# Patient Record
Sex: FEMALE | Race: WHITE | NOT HISPANIC OR LATINO | Employment: OTHER | URBAN - METROPOLITAN AREA
[De-identification: names, ages, dates, MRNs, and addresses within clinical notes are randomized per-mention and may not be internally consistent; named-entity substitution may affect disease eponyms.]

---

## 2017-02-03 ENCOUNTER — ALLSCRIPTS OFFICE VISIT (OUTPATIENT)
Dept: OTHER | Facility: OTHER | Age: 42
End: 2017-02-03

## 2017-02-03 ENCOUNTER — GENERIC CONVERSION - ENCOUNTER (OUTPATIENT)
Dept: OTHER | Facility: OTHER | Age: 42
End: 2017-02-03

## 2017-02-03 DIAGNOSIS — Z01.419 ENCOUNTER FOR GYNECOLOGICAL EXAMINATION WITHOUT ABNORMAL FINDING: ICD-10-CM

## 2017-02-03 DIAGNOSIS — Z12.31 ENCOUNTER FOR SCREENING MAMMOGRAM FOR MALIGNANT NEOPLASM OF BREAST: ICD-10-CM

## 2017-02-16 ENCOUNTER — GENERIC CONVERSION - ENCOUNTER (OUTPATIENT)
Dept: OTHER | Facility: OTHER | Age: 42
End: 2017-02-16

## 2017-04-24 ENCOUNTER — GENERIC CONVERSION - ENCOUNTER (OUTPATIENT)
Dept: OTHER | Facility: OTHER | Age: 42
End: 2017-04-24

## 2017-07-10 ENCOUNTER — GENERIC CONVERSION - ENCOUNTER (OUTPATIENT)
Dept: OTHER | Facility: OTHER | Age: 42
End: 2017-07-10

## 2017-07-20 ENCOUNTER — ALLSCRIPTS OFFICE VISIT (OUTPATIENT)
Dept: OTHER | Facility: OTHER | Age: 42
End: 2017-07-20

## 2018-01-02 ENCOUNTER — ALLSCRIPTS OFFICE VISIT (OUTPATIENT)
Dept: OTHER | Facility: OTHER | Age: 43
End: 2018-01-02

## 2018-01-02 LAB
CLUE CELL (HISTORICAL): NORMAL
HCG, QUALITATIVE (HISTORICAL): NEGATIVE
HYPHAL YEAST (HISTORICAL): NORMAL
KOH PREP (HISTORICAL): NORMAL
PH UR STRIP.AUTO: 5 [PH]
TRICHOMONAS (HISTORICAL): NORMAL
YEAST (HISTORICAL): NORMAL

## 2018-01-12 NOTE — PROGRESS NOTES
Chief Complaint  Depo today  History of Present Illness  HPI: Presents for Depo but states she feels like she has a UTI, frequent urination, pain with urination   Hospital Based Practices Required Assessment:   Pain Assessment   the patient states they have pain  The pain is located in the suprapubic, with urination and palpation  The patient describes the pain as sharp  Active Problems    1  Abscess (682 9) (L02 91)   2  Acute pharyngitis, unspecified etiology (462) (J02 9)   3  Anxiety (300 00) (F41 9)   4  Birth control (V25 9) (Z30 9)   5  Breast lump (611 72) (N63)   6  Breast thickening (611 79) (N64 59)   7  Common migraine without aura (346 10) (G43 009)   8  Contraceptives (V25 02)   9  Dental caries (521 00) (K02 9)   10  Depression (311) (F32 9)   11  Dysuria (788 1) (R30 0)   12  Encounter for other contraceptive management (V25 8) (Z30 8)   13  Esophagitis, reflux (530 11) (K21 0)   14  Fatigue (780 79) (R53 83)   15  Fibromyalgia (729 1) (M79 7)   16  Functional diarrhea (564 5) (K59 1)   17  Generalized anxiety disorder (300 02) (F41 1)   18  Hepatitis, C Virus (070 70)   19  Herpes simplex infection (054 9) (B00 9)   20  Herpetic ulceration of vulva (054 12) (A60 04)   21  Itching (698 9) (L29 9)   22  Knee pain, left (719 46) (M25 562)   23  Need for prophylactic vaccination and inoculation against influenza (V04 81) (Z23)   24  On Depo-Provera for contraception (V25 49) (Z30 40)   25  Rheumatoid arthritis (714 0) (M06 9)   26  Tinea corporis (110 5) (B35 4)   27  Urinary tract infection (599 0) (N39 0)   28  URTI (acute upper respiratory infection) (465 9) (J06 9)   29  Visit for gynecologic examination (V72 31) (Z01 419)   30  Vulvovaginitis candida albicans (112 1) (B37 3)   31  Weight loss (783 21) (R63 4)    Current Meds   1  ALPRAZolam 1 MG Oral Tablet; Therapy: 29Aug2011 to (Last Juancho Zhou)  Requested for: 29Aug2011 Ordered   2   Cephalexin 500 MG Oral Capsule; TAKE 1 CAPSULE EVERY 12 HOURS DAILY; Therapy: 83OFL8341 to (Evaluate:50Sks5033)  Requested for: 76ZGL2495; Last   Rx:14Qtl3374 Ordered   3  MedroxyPROGESTERone Acetate 150 MG/ML Intramuscular Suspension; INJECT 1 ML   INTRAMUSCULARLY ONCE EVERY 3 MONTHS; Therapy: 95JFT3653 to (Evaluate:16Njr0348)  Requested for: 16Obn9751; Last   Rx:34Xxq3600 Ordered   4  MedroxyPROGESTERone Acetate 150 MG/ML Intramuscular Suspension; INJECT   EVERY 11 WEEKS AS DIRECTED; Therapy: 86Civ0915 to (Last Rx:69Def8509)  Requested for: 55Hdq1918 Ordered   5  Protonix 40 MG Oral Tablet Delayed Release; Therapy: (So Jim) to Recorded   6  Remeron TABS; Therapy: (Recorded:07Qnx9744) to Recorded   7  Restoril 30 MG Oral Capsule; Therapy: (Leticia Sarmiento) to Recorded   8  Suboxone SUBL; Therapy: (Recorded:03Oct2016) to Recorded    Allergies    1  Codeine Derivatives   2  Codeine Sulfate TABS   3  Morphine Derivatives   4  Morphine Sulfate (PF) SOLN   5  Morphine Sulfate SOLN   6  Sulfa Drugs  Denied    7  Aspirin TABS    Vitals  Signs    Systolic: 791  Diastolic: 83  Weight: 538 lb   BMI Calculated: 29 12  BSA Calculated: 1 87    Physical Exam    Constitutional   General appearance: No acute distress, well appearing and well nourished  Abdomen   Abdomen: Abnormal   tender with suprapubic palpation  Assessment    1  On Depo-Provera for contraception (V25 49) (Z30 40)   2   Urinary tract infection (599 0) (N39 0)    Plan  Birth control    · MedroxyPROGESTERone Acetate 150 MG/ML Intramuscular Suspension  (Depo-Provera)  Health Maintenance    · Follow-up visit in 1 month Evaluation and Treatment  Follow-up  Annual exam ue after 12/22/2016  Status: Hold For - Scheduling  Requested for:  91HFF8659   · Fluzone Quadrivalent 0 5 ML Intramuscular Suspension Prefilled Syringe  SocHx: On Depo-Provera for contraception    · MedroxyPROGESTERone Acetate 150 MG/ML Intramuscular Suspension  (Depo-Provera); INJECT INTRAMUSCULARLY AS DIRECTED  Urinary tract infection    · Nitrofurantoin Monohyd Macro 100 MG Oral Capsule (Macrobid); TAKE 1 CAPSULE  EVERY 12 HOURS DAILY   · (1) URINALYSIS w URINE C/S REFLEX (will reflex a microscopy if leukocytes, occult  blood, or nitrites are not within normal limits); Status:Active; Requested for:18Nov2016;    · (1) URINE CULTURE; Source:Urine, Clean Catch; Status:Active; Requested  for:18Nov2016;     Discussion/Summary    Safe and effective use of Macrobid provided  Encouraged to increase fluids  Annual due after 12/22/2016  Pt verbalized understanding of all discussed  Future Appointments    Date/Time Provider Specialty Site   02/03/2017 08:30 AM [facility] HILARIO Ye Schedule  [facility]     Signatures   Electronically signed by :  HILARIO Harrington; Nov 18 2016  1:09PM EST                       (Author)    Electronically signed by : Magdaleno Mcgee MD; Dec 13 2016  5:19PM EST

## 2018-01-13 VITALS
SYSTOLIC BLOOD PRESSURE: 129 MMHG | WEIGHT: 171 LBS | BODY MASS INDEX: 28.49 KG/M2 | DIASTOLIC BLOOD PRESSURE: 84 MMHG | HEIGHT: 65 IN

## 2018-01-13 NOTE — MISCELLANEOUS
Provider Comments  Provider Comments:   PT WAS NO SHOW FOR DEPO I CALLED AND LEFT MSG AND ALSO SENT A NO SHOW LETTER TO PT      Signatures   Electronically signed by : Arnaldo Perea, ; Jul 10 2017 12:25PM EST                       (Author)

## 2018-01-14 VITALS — DIASTOLIC BLOOD PRESSURE: 82 MMHG | SYSTOLIC BLOOD PRESSURE: 124 MMHG | BODY MASS INDEX: 23.3 KG/M2 | WEIGHT: 140 LBS

## 2018-01-14 NOTE — PROGRESS NOTES
Chief Complaint  Patient is here for Depo injection today  Active Problems    1  Abscess (682 9) (L02 91)   2  Anxiety (300 00) (F41 9)   3  Birth control (V25 9) (Z30 9)   4  Breast lump (611 72) (N63)   5  Breast thickening (611 79) (N64 59)   6  Common migraine without aura (346 10) (G43 009)   7  Contraceptives (V25 02)   8  Dental caries (521 00) (K02 9)   9  Depression (311) (F32 9)   10  Dysuria (788 1) (R30 0)   11  Encounter for Depo-Provera contraception (V25 49) (Z30 42)   12  Encounter for other contraceptive management (V25 8) (Z30 8)   13  Encounter for routine gynecological examination with Papanicolaou smear of cervix    (V72 31,V76 2) (Z01 419)   14  Encounter for screening mammogram for malignant neoplasm of breast (V76 12)    (Z12 31)   15  Esophagitis, reflux (530 11) (K21 0)   16  Fatigue (780 79) (R53 83)   17  Fibromyalgia (729 1) (M79 7)   18  Functional diarrhea (564 5) (K59 1)   19  Generalized anxiety disorder (300 02) (F41 1)   20  Hepatitis, C Virus (070 70)   21  Herpes simplex infection (054 9) (B00 9)   22  Herpetic ulceration of vulva (054 12) (A60 04)   23  Itching (698 9) (L29 9)   24  Knee pain, left (719 46) (M25 562)   25  Need for prophylactic vaccination and inoculation against influenza (V04 81) (Z23)   26  On Depo-Provera for contraception (V25 49) (Z30 40)   27  Rheumatoid arthritis (714 0) (M06 9)   28  Tinea corporis (110 5) (B35 4)   29  Visit for gynecologic examination (V72 31) (Z01 419)   30  Weight loss (783 21) (R63 4)    Current Meds   1  ALPRAZolam 1 MG Oral Tablet; Therapy: 19Zju5372 to (Last Keri Tao)  Requested for: 09Rpd5794 Ordered   2  Cephalexin 500 MG Oral Capsule; TAKE 1 CAPSULE EVERY 12 HOURS DAILY; Therapy: 01KJL0918 to (Evaluate:53Uli8084)  Requested for: 51USM7119; Last   Rx:15Lmk9071 Ordered   3  MedroxyPROGESTERone Acetate 150 MG/ML Intramuscular Suspension; INJECT 1 ML   INTRAMUSCULARLY ONCE EVERY 3 MONTHS;    Therapy: 65YCP7204 to (Evaluate:66Yms8380)  Requested for: 47XCZ5181; Last   Rx:79Ezd8022 Ordered   4  MedroxyPROGESTERone Acetate 150 MG/ML Intramuscular Suspension; INJECT   EVERY 11 WEEKS AS DIRECTED; Therapy: 89Jnv8635 to (Last Rx:39Avp2657)  Requested for: 07Iwc8937 Ordered   5  MedroxyPROGESTERone Acetate 150 MG/ML Intramuscular Suspension; INJECT   INTRAMUSCULARLY AS DIRECTED; Therapy: 71ORQ1870 to (Annalisa Yfn)  Requested for: 62GQO6938; Last   Rx:14Nov2016 Ordered   6  Protonix 40 MG Oral Tablet Delayed Release; Therapy: (Vick Willis) to Recorded   7  Remeron TABS; Therapy: (Recorded:41Bbh9118) to Recorded   8  Suboxone SUBL; Therapy: (Recorded:03Oct2016) to Recorded    Allergies    1  Codeine Derivatives   2  Codeine Sulfate TABS   3  Morphine Derivatives   4  Morphine Sulfate (PF) SOLN   5  Morphine Sulfate SOLN   6  Sulfa Drugs  Denied    7   Aspirin TABS    Vitals  Signs    Systolic: 791  Diastolic: 82  Weight: 967 lb   BMI Calculated: 23 3  BSA Calculated: 1 7    Plan  Birth control    · MedroxyPROGESTERone Acetate 150 MG/ML Intramuscular Suspension    Future Appointments    Date/Time Provider Specialty Site   10/10/2017 03:00 PM [facility], Injection Schedule  [facility]     Signatures   Electronically signed by : Lisette Jackson, ; Jul 20 2017  3:12PM EST                       (Author)    Electronically signed by : Tin Cervantes MD; Jul 20 2017  4:41PM EST                       (Administrative)

## 2018-01-18 NOTE — PROGRESS NOTES
Chief Complaint  Depo      Active Problems    1  Acute pharyngitis, unspecified etiology (462) (J02 9)   2  Anxiety (300 00) (F41 9)   3  Birth control (V25 9) (Z30 9)   4  Breast lump (611 72) (N63)   5  Breast thickening (611 79) (N64 59)   6  Common migraine without aura (346 10) (G43 009)   7  Contraceptives (V25 02)   8  Dental caries (521 00) (K02 9)   9  Depression (311) (F32 9)   10  Dysuria (788 1) (R30 0)   11  Encounter for other contraceptive management (V25 8) (Z30 8)   12  Esophagitis, reflux (530 11) (K21 0)   13  Fatigue (780 79) (R53 83)   14  Fibromyalgia (729 1) (M79 7)   15  Functional diarrhea (564 5) (K59 1)   16  Generalized anxiety disorder (300 02) (F41 1)   17  Hepatitis, C Virus (070 70)   18  Herpes simplex infection (054 9) (B00 9)   19  Herpetic ulceration of vulva (054 12) (A60 04)   20  Itching (698 9) (L29 9)   21  Knee pain, left (719 46) (M25 562)   22  Need for prophylactic vaccination and inoculation against influenza (V04 81) (Z23)   23  Rheumatoid arthritis (714 0) (M06 9)   24  Tinea corporis (110 5) (B35 4)   25  Urinary tract infection (599 0) (N39 0)   26  URTI (acute upper respiratory infection) (465 9) (J06 9)   27  Visit for gynecologic examination (V72 31) (Z01 419)   28  Vulvovaginitis candida albicans (112 1) (B37 3)   29  Weight loss (783 21) (R63 4)    Current Meds   1  ALPRAZolam 1 MG Oral Tablet; Therapy: 00Smw3762 to (Last Fernando Godfrey)  Requested for: 00Kku7795 Ordered   2  MedroxyPROGESTERone Acetate 150 MG/ML Intramuscular Suspension; INJECT 1 ML   INTRAMUSCULARLY ONCE EVERY 3 MONTHS; Therapy: 87FIX4597 to (Evaluate:37Riu4073)  Requested for: 02Gcl8688; Last   Rx:44Eqc8016 Ordered   3  Protonix 40 MG Oral Tablet Delayed Release; Therapy: (Beau Coleman) to Recorded   4  Remeron TABS; Therapy: (Recorded:55Aps6977) to Recorded   5  Restoril 30 MG Oral Capsule; Therapy: (Recorded:28Apr2015) to Recorded    Allergies    1  Codeine Sulfate TABS   2  Morphine Sulfate SOLN   3  Sulfa Drugs  Denied    4  Aspirin TABS    Vitals  Signs [Data Includes: Current Encounter]    Systolic: 071  Diastolic: 82  Weight: 465 lb   BMI Calculated: 27 15  BSA Calculated: 1 8    Plan  Birth control    · MedroxyPROGESTERone Acetate 150 MG/ML Intramuscular Suspension  (Depo-Provera)    Future Appointments    Date/Time Provider Specialty Site   06/13/2016 03:15 PM [facility], Injection Schedule  [facility]     Signatures   Electronically signed by :  HILARIO Leblanc; Mar 31 2016  9:02AM EST                       (Acknowledgement)    Electronically signed by : Katarina Young MD; Apr 4 2016 12:04PM EST

## 2018-01-22 VITALS — SYSTOLIC BLOOD PRESSURE: 109 MMHG | WEIGHT: 176 LBS | DIASTOLIC BLOOD PRESSURE: 69 MMHG | BODY MASS INDEX: 29.29 KG/M2

## 2018-01-22 VITALS
WEIGHT: 175 LBS | BODY MASS INDEX: 29.16 KG/M2 | HEIGHT: 65 IN | SYSTOLIC BLOOD PRESSURE: 102 MMHG | DIASTOLIC BLOOD PRESSURE: 72 MMHG

## 2018-02-20 ENCOUNTER — OFFICE VISIT (OUTPATIENT)
Dept: OBGYN CLINIC | Facility: CLINIC | Age: 43
End: 2018-02-20
Payer: COMMERCIAL

## 2018-02-20 VITALS
WEIGHT: 167 LBS | DIASTOLIC BLOOD PRESSURE: 77 MMHG | BODY MASS INDEX: 27.82 KG/M2 | HEIGHT: 65 IN | SYSTOLIC BLOOD PRESSURE: 121 MMHG | HEART RATE: 87 BPM

## 2018-02-20 DIAGNOSIS — Z12.39 ENCOUNTER FOR OTHER SCREENING FOR MALIGNANT NEOPLASM OF BREAST: ICD-10-CM

## 2018-02-20 DIAGNOSIS — Z01.419 ENCOUNTER FOR ANNUAL ROUTINE GYNECOLOGICAL EXAMINATION: Primary | ICD-10-CM

## 2018-02-20 PROCEDURE — 99214 OFFICE O/P EST MOD 30 MIN: CPT | Performed by: NURSE PRACTITIONER

## 2018-02-20 RX ORDER — ALPRAZOLAM 0.25 MG/1
TABLET ORAL 3 TIMES DAILY PRN
COMMUNITY
End: 2018-09-17

## 2018-02-20 NOTE — PROGRESS NOTES
Assessment     Annual GYN exam    Depoprovera for birth control            Plan      All questions answered  Breast self exam technique reviewed and patient encouraged to perform self-exam monthly  Contraception: Depo-Provera injections  Discussed healthy lifestyle modifications  Follow up in 1 year  Mammogram   Schedule mammogram  Call with needs or concerns  Return in 1 year  Pt verbalized understanding of all discussed  Yumiko Fry is a 40 y o  female who presents for annual exam  Periods are not present, pt is on Depo , Dysmenorrhea:none  Cyclic symptoms include none  No intermenstrual bleeding, spotting, or discharge  The patient reports that there is not domestic violence in her life  Last PAP 2017 neg  High risk HPV, WNL PAP    Current contraception: Depo-Provera injections  History of abnormal Pap smear: no  Family history of uterine or ovarian cancer: no  Regular self breast exam: yes  History of abnormal mammogram: N/A  Family history of breast cancer: yes - grandfather, great-grandmother  History of abnormal lipids: no    Menstrual History: No period on Depo    OB History      Para Term  AB Living    4 2 2   2 2    SAB TAB Ectopic Multiple Live Births      2     2         Menarche age: 9  No LMP recorded  Patient has had an injection  , Depoprovera       The following portions of the patient's history were reviewed and updated as appropriate: allergies, current medications, past family history, past medical history, past social history, past surgical history and problem list     Review of Systems  Pertinent items are noted in HPI        Objective      /77   Pulse 87   Ht 5' 5" (1 651 m)   Wt 75 8 kg (167 lb)   BMI 27 79 kg/m²     General:   alert and oriented, in no acute distress, alert, appears stated age and cooperative   Heart: regular rate and rhythm, S1, S2 normal, no murmur, click, rub or gallop   Lungs: clear to auscultation bilaterally Abdomen: soft, non-tender, without masses or organomegaly   Vulva: normal   Vagina: normal mucosa   Cervix: no cervical motion tenderness and no lesions   Uterus: normal size, normal shape and consistency   Adnexa: normal adnexa   Breast NT, neg   Lumps, discharge,dimpling

## 2018-03-09 ENCOUNTER — HOSPITAL ENCOUNTER (OUTPATIENT)
Dept: MAMMOGRAPHY | Facility: HOSPITAL | Age: 43
Discharge: HOME | End: 2018-03-09
Payer: COMMERCIAL

## 2018-03-09 DIAGNOSIS — Z12.39 ENCOUNTER FOR OTHER SCREENING FOR MALIGNANT NEOPLASM OF BREAST: ICD-10-CM

## 2018-03-09 PROCEDURE — 77067 SCR MAMMO BI INCL CAD: CPT

## 2018-03-23 ENCOUNTER — OFFICE VISIT (OUTPATIENT)
Dept: URGENT CARE | Age: 43
End: 2018-03-23
Payer: COMMERCIAL

## 2018-03-23 VITALS
BODY MASS INDEX: 28.32 KG/M2 | TEMPERATURE: 98.6 F | HEART RATE: 101 BPM | DIASTOLIC BLOOD PRESSURE: 72 MMHG | OXYGEN SATURATION: 97 % | RESPIRATION RATE: 20 BRPM | HEIGHT: 65 IN | WEIGHT: 170 LBS | SYSTOLIC BLOOD PRESSURE: 132 MMHG

## 2018-03-23 DIAGNOSIS — R31.9 URINARY TRACT INFECTION WITH HEMATURIA, SITE UNSPECIFIED: Primary | ICD-10-CM

## 2018-03-23 DIAGNOSIS — R35.0 URINARY FREQUENCY: ICD-10-CM

## 2018-03-23 DIAGNOSIS — N39.0 URINARY TRACT INFECTION WITH HEMATURIA, SITE UNSPECIFIED: Primary | ICD-10-CM

## 2018-03-23 LAB
SL AMB  POCT GLUCOSE, UA: NEGATIVE
SL AMB LEUKOCYTE ESTERASE,UA: NEGATIVE
SL AMB POCT BILIRUBIN,UA: NEGATIVE
SL AMB POCT BLOOD,UA: NORMAL
SL AMB POCT CLARITY,UA: NORMAL
SL AMB POCT COLOR,UA: YELLOW
SL AMB POCT KETONES,UA: NEGATIVE
SL AMB POCT NITRITE,UA: NEGATIVE
SL AMB POCT PH,UA: 5
SL AMB POCT SPECIFIC GRAVITY,UA: 1
SL AMB POCT URINE PROTEIN: NEGATIVE
SL AMB POCT UROBILINOGEN: 0.2

## 2018-03-23 PROCEDURE — 99213 OFFICE O/P EST LOW 20 MIN: CPT | Performed by: FAMILY MEDICINE

## 2018-03-23 PROCEDURE — 87086 URINE CULTURE/COLONY COUNT: CPT | Performed by: FAMILY MEDICINE

## 2018-03-23 RX ORDER — FLUCONAZOLE 150 MG/1
TABLET ORAL
Qty: 3 TABLET | Refills: 0 | Status: SHIPPED | OUTPATIENT
Start: 2018-03-23 | End: 2018-03-23

## 2018-03-23 RX ORDER — CIPROFLOXACIN 500 MG/1
500 TABLET, FILM COATED ORAL EVERY 12 HOURS SCHEDULED
Qty: 20 TABLET | Refills: 0 | Status: SHIPPED | OUTPATIENT
Start: 2018-03-23 | End: 2018-04-02

## 2018-03-23 NOTE — PROGRESS NOTES
[facility]        NAME: Marisa Dickens is a 44 y o  female  : 1975    MRN: [id number]  DATE: 2018  TIME: 12:32 PM    Assessment and Plan   Urinary tract infection with hematuria, site unspecified [N39 0, R31 9]  1  Urinary tract infection with hematuria, site unspecified  ciprofloxacin (CIPRO) 500 mg tablet    fluconazole (DIFLUCAN) 150 mg tablet   2  Urinary frequency  POCT urine dip         Patient Instructions     Patient Instructions   Cipro twice a day until finished (please take probiotics)  Increase fluids (cranberry juice)  Take 1 Diflucan, may repeat weekly as needed  Recheck/follow-up with family physician/GYN or Urology as needed  Ferdinand Vásquez  [phone]    Please go to the hospital emergency department if worse  Proceed to  ER if symptoms worsen  Chief Complaint     Chief Complaint   Patient presents with    Urinary Frequency    Back Pain         History of Present Illness       Patient with urinary frequency dysuria, left-sided mid back pain, chills/fever; patient states she has had urinary tract infections in the past; patient states she also gets recurring yeast infections        Review of Systems   Review of Systems   Constitutional: Positive for chills and fatigue  Respiratory: Negative  Cardiovascular: Negative  Gastrointestinal: Negative  Genitourinary: Positive for dysuria, frequency and vaginal discharge  Musculoskeletal:        Tenderness over left mid back/left CVA area   Skin: Negative            Current Medications       Current Outpatient Prescriptions:     ALPRAZolam (XANAX) 0 25 mg tablet, Take by mouth 3 (three) times a day as needed for anxiety, Disp: , Rfl:     Buprenorphine HCl-Naloxone HCl (ZUBSOLV SL), Place under the tongue daily, Disp: , Rfl:     ciprofloxacin (CIPRO) 500 mg tablet, Take 1 tablet (500 mg total) by mouth every 12 (twelve) hours for 20 doses, Disp: 20 tablet, Rfl: 0    fluconazole (DIFLUCAN) 150 mg tablet, Take 1 tablet, may repeat weekly as needed, Disp: 3 tablet, Rfl: 0    Current Allergies     Allergies as of 03/23/2018 - Reviewed 03/23/2018   Allergen Reaction Noted    Sulfa antibiotics Anaphylaxis 02/20/2018    Codeine  04/22/2012    Morphine  04/22/2012            The following portions of the patient's history were reviewed and updated as appropriate: allergies, current medications, past family history, past medical history, past social history, past surgical history and problem list      Past Medical History:   Diagnosis Date    Arthritis     Endometriosis     no current issues    Infectious viral hepatitis     history of hep c    Irritable bowel syndrome        Past Surgical History:   Procedure Laterality Date    APPENDECTOMY      CHOLECYSTECTOMY         Family History   Problem Relation Age of Onset    Cancer Father          Medications have been verified  Objective   /72   Pulse 101   Temp 98 6 °F (37 °C) (Temporal)   Resp 20   Ht 5' 5" (1 651 m)   Wt 77 1 kg (170 lb)   SpO2 97%   BMI 28 29 kg/m²        Physical Exam     Physical Exam   Constitutional: She is oriented to person, place, and time  She appears well-developed and well-nourished  HENT:   Mouth/Throat: Oropharynx is clear and moist    Neck: Normal range of motion  Neck supple  Musculoskeletal:   Generalized discomfort over left mid back/left CVA area   Neurological: She is alert and oriented to person, place, and time  Good color and turgor   Skin: Skin is warm  Good color and turgor   Psychiatric: She has a normal mood and affect  Her behavior is normal    Nursing note and vitals reviewed

## 2018-03-23 NOTE — PATIENT INSTRUCTIONS
Cipro twice a day until finished (please take probiotics)  Increase fluids (cranberry juice)  Take 1 Diflucan, may repeat weekly as needed  Recheck/follow-up with family physician/GYN or Urology as needed  Asuncion Escalante  [phone]    Please go to the hospital emergency department if worse

## 2018-03-24 ENCOUNTER — TELEPHONE (OUTPATIENT)
Dept: URGENT CARE | Age: 43
End: 2018-03-24

## 2018-03-24 LAB — BACTERIA UR CULT: NORMAL

## 2018-03-26 ENCOUNTER — CLINICAL SUPPORT (OUTPATIENT)
Dept: OBGYN CLINIC | Facility: CLINIC | Age: 43
End: 2018-03-26
Payer: COMMERCIAL

## 2018-03-26 DIAGNOSIS — Z30.42 ENCOUNTER FOR SURVEILLANCE OF INJECTABLE CONTRACEPTIVE: Primary | ICD-10-CM

## 2018-03-26 PROCEDURE — 96372 THER/PROPH/DIAG INJ SC/IM: CPT | Performed by: OBSTETRICS & GYNECOLOGY

## 2018-03-26 RX ORDER — MEDROXYPROGESTERONE ACETATE 150 MG/ML
150 INJECTION, SUSPENSION INTRAMUSCULAR ONCE
Status: COMPLETED | OUTPATIENT
Start: 2018-03-26 | End: 2018-03-26

## 2018-03-26 RX ADMIN — MEDROXYPROGESTERONE ACETATE 150 MG: 150 INJECTION, SUSPENSION INTRAMUSCULAR at 10:45

## 2018-06-11 ENCOUNTER — CLINICAL SUPPORT (OUTPATIENT)
Dept: OBGYN CLINIC | Facility: CLINIC | Age: 43
End: 2018-06-11
Payer: COMMERCIAL

## 2018-06-11 DIAGNOSIS — Z30.42 ENCOUNTER FOR SURVEILLANCE OF INJECTABLE CONTRACEPTIVE: Primary | ICD-10-CM

## 2018-06-11 PROCEDURE — 96372 THER/PROPH/DIAG INJ SC/IM: CPT

## 2018-06-11 RX ORDER — MEDROXYPROGESTERONE ACETATE 150 MG/ML
150 INJECTION, SUSPENSION INTRAMUSCULAR ONCE
Status: COMPLETED | OUTPATIENT
Start: 2018-06-11 | End: 2018-06-11

## 2018-06-11 RX ORDER — MEDROXYPROGESTERONE ACETATE 150 MG/ML
150 INJECTION, SUSPENSION INTRAMUSCULAR
Qty: 1 ML | Refills: 5 | Status: SHIPPED | OUTPATIENT
Start: 2018-06-11 | End: 2019-07-23 | Stop reason: SDUPTHER

## 2018-06-11 RX ADMIN — MEDROXYPROGESTERONE ACETATE 150 MG: 150 INJECTION, SUSPENSION INTRAMUSCULAR at 09:53

## 2018-08-27 ENCOUNTER — TELEPHONE (OUTPATIENT)
Dept: OBGYN CLINIC | Facility: CLINIC | Age: 43
End: 2018-08-27

## 2018-08-27 NOTE — TELEPHONE ENCOUNTER
left message on v/m about r/s her depo apt due to no providers in the office  told her to call back to reschedule

## 2018-09-04 ENCOUNTER — CLINICAL SUPPORT (OUTPATIENT)
Dept: OBGYN CLINIC | Facility: CLINIC | Age: 43
End: 2018-09-04
Payer: COMMERCIAL

## 2018-09-04 DIAGNOSIS — Z30.42 ENCOUNTER FOR SURVEILLANCE OF INJECTABLE CONTRACEPTIVE: Primary | ICD-10-CM

## 2018-09-04 PROCEDURE — 96372 THER/PROPH/DIAG INJ SC/IM: CPT

## 2018-09-04 RX ORDER — MEDROXYPROGESTERONE ACETATE 150 MG/ML
150 INJECTION, SUSPENSION INTRAMUSCULAR ONCE
Status: COMPLETED | OUTPATIENT
Start: 2018-09-04 | End: 2018-09-04

## 2018-09-04 RX ADMIN — MEDROXYPROGESTERONE ACETATE 150 MG: 150 INJECTION, SUSPENSION INTRAMUSCULAR at 14:21

## 2018-09-04 NOTE — PROGRESS NOTES
Depo-Provera      [x]   Patient provided box yes   syringe    Last  Annual/Pap Date:2/20/18   Last Depo date: 6/11/18   Side effects: none   HCG; if applicable: N/A   Given by: Jo-Ann Aragon Site: right buttock   Next appt  due: 11/20/18   Calcium supplement daily teaching, condoms for 2 weeks following first injection dose

## 2018-09-17 ENCOUNTER — APPOINTMENT (EMERGENCY)
Dept: CT IMAGING | Facility: HOSPITAL | Age: 43
End: 2018-09-17
Payer: COMMERCIAL

## 2018-09-17 ENCOUNTER — OFFICE VISIT (OUTPATIENT)
Dept: URGENT CARE | Age: 43
End: 2018-09-17
Payer: COMMERCIAL

## 2018-09-17 ENCOUNTER — HOSPITAL ENCOUNTER (EMERGENCY)
Facility: HOSPITAL | Age: 43
Discharge: HOME | End: 2018-09-17
Attending: EMERGENCY MEDICINE
Payer: COMMERCIAL

## 2018-09-17 VITALS
BODY MASS INDEX: 28.62 KG/M2 | TEMPERATURE: 97.5 F | RESPIRATION RATE: 20 BRPM | WEIGHT: 171.8 LBS | OXYGEN SATURATION: 97 % | HEART RATE: 108 BPM | DIASTOLIC BLOOD PRESSURE: 74 MMHG | HEIGHT: 65 IN | SYSTOLIC BLOOD PRESSURE: 140 MMHG

## 2018-09-17 VITALS
SYSTOLIC BLOOD PRESSURE: 113 MMHG | DIASTOLIC BLOOD PRESSURE: 68 MMHG | BODY MASS INDEX: 28.62 KG/M2 | TEMPERATURE: 98.4 F | HEART RATE: 75 BPM | RESPIRATION RATE: 18 BRPM | WEIGHT: 171.96 LBS | OXYGEN SATURATION: 98 %

## 2018-09-17 DIAGNOSIS — R19.7 DIARRHEA, UNSPECIFIED TYPE: ICD-10-CM

## 2018-09-17 DIAGNOSIS — R10.9 ABDOMINAL PAIN: ICD-10-CM

## 2018-09-17 DIAGNOSIS — R19.7 DIARRHEA: Primary | ICD-10-CM

## 2018-09-17 DIAGNOSIS — R10.32 LEFT LOWER QUADRANT PAIN: Primary | ICD-10-CM

## 2018-09-17 LAB
ALBUMIN SERPL BCP-MCNC: 4.1 G/DL (ref 3.5–5)
ALP SERPL-CCNC: 38 U/L (ref 46–116)
ALT SERPL W P-5'-P-CCNC: 22 U/L (ref 12–78)
ANION GAP SERPL CALCULATED.3IONS-SCNC: 7 MMOL/L (ref 4–13)
AST SERPL W P-5'-P-CCNC: 18 U/L (ref 5–45)
BACTERIA UR QL AUTO: ABNORMAL /HPF
BASOPHILS # BLD AUTO: 0.03 THOUSANDS/ΜL (ref 0–0.1)
BASOPHILS NFR BLD AUTO: 1 % (ref 0–1)
BILIRUB SERPL-MCNC: 0.43 MG/DL (ref 0.2–1)
BILIRUB UR QL STRIP: NEGATIVE
BUN SERPL-MCNC: 15 MG/DL (ref 5–25)
CALCIUM SERPL-MCNC: 9 MG/DL (ref 8.3–10.1)
CHLORIDE SERPL-SCNC: 104 MMOL/L (ref 100–108)
CLARITY UR: CLEAR
CO2 SERPL-SCNC: 28 MMOL/L (ref 21–32)
COLOR UR: YELLOW
CREAT SERPL-MCNC: 0.68 MG/DL (ref 0.6–1.3)
EOSINOPHIL # BLD AUTO: 0.15 THOUSAND/ΜL (ref 0–0.61)
EOSINOPHIL NFR BLD AUTO: 2 % (ref 0–6)
ERYTHROCYTE [DISTWIDTH] IN BLOOD BY AUTOMATED COUNT: 12 % (ref 11.6–15.1)
EXT PREG TEST URINE: NEGATIVE
GFR SERPL CREATININE-BSD FRML MDRD: 108 ML/MIN/1.73SQ M
GLUCOSE SERPL-MCNC: 88 MG/DL (ref 65–140)
GLUCOSE UR STRIP-MCNC: NEGATIVE MG/DL
HCT VFR BLD AUTO: 41 % (ref 34.8–46.1)
HGB BLD-MCNC: 13.6 G/DL (ref 11.5–15.4)
HGB UR QL STRIP.AUTO: ABNORMAL
IMM GRANULOCYTES # BLD AUTO: 0.01 THOUSAND/UL (ref 0–0.2)
IMM GRANULOCYTES NFR BLD AUTO: 0 % (ref 0–2)
KETONES UR STRIP-MCNC: NEGATIVE MG/DL
LEUKOCYTE ESTERASE UR QL STRIP: ABNORMAL
LIPASE SERPL-CCNC: 106 U/L (ref 73–393)
LYMPHOCYTES # BLD AUTO: 2.96 THOUSANDS/ΜL (ref 0.6–4.47)
LYMPHOCYTES NFR BLD AUTO: 45 % (ref 14–44)
MCH RBC QN AUTO: 29.1 PG (ref 26.8–34.3)
MCHC RBC AUTO-ENTMCNC: 33.2 G/DL (ref 31.4–37.4)
MCV RBC AUTO: 88 FL (ref 82–98)
MONOCYTES # BLD AUTO: 0.31 THOUSAND/ΜL (ref 0.17–1.22)
MONOCYTES NFR BLD AUTO: 5 % (ref 4–12)
NEUTROPHILS # BLD AUTO: 3.17 THOUSANDS/ΜL (ref 1.85–7.62)
NEUTS SEG NFR BLD AUTO: 47 % (ref 43–75)
NITRITE UR QL STRIP: NEGATIVE
NON-SQ EPI CELLS URNS QL MICRO: ABNORMAL /HPF
NRBC BLD AUTO-RTO: 0 /100 WBCS
PH UR STRIP.AUTO: 5.5 [PH] (ref 4.5–8)
PLATELET # BLD AUTO: 183 THOUSANDS/UL (ref 149–390)
PMV BLD AUTO: 10.6 FL (ref 8.9–12.7)
POTASSIUM SERPL-SCNC: 3.8 MMOL/L (ref 3.5–5.3)
PROT SERPL-MCNC: 7.8 G/DL (ref 6.4–8.2)
PROT UR STRIP-MCNC: NEGATIVE MG/DL
RBC # BLD AUTO: 4.67 MILLION/UL (ref 3.81–5.12)
RBC #/AREA URNS AUTO: ABNORMAL /HPF
SODIUM SERPL-SCNC: 139 MMOL/L (ref 136–145)
SP GR UR STRIP.AUTO: 1.02 (ref 1–1.03)
UROBILINOGEN UR QL STRIP.AUTO: 0.2 E.U./DL
WBC # BLD AUTO: 6.63 THOUSAND/UL (ref 4.31–10.16)
WBC #/AREA URNS AUTO: ABNORMAL /HPF

## 2018-09-17 PROCEDURE — 99213 OFFICE O/P EST LOW 20 MIN: CPT | Performed by: PHYSICIAN ASSISTANT

## 2018-09-17 PROCEDURE — 99284 EMERGENCY DEPT VISIT MOD MDM: CPT

## 2018-09-17 PROCEDURE — 85025 COMPLETE CBC W/AUTO DIFF WBC: CPT | Performed by: STUDENT IN AN ORGANIZED HEALTH CARE EDUCATION/TRAINING PROGRAM

## 2018-09-17 PROCEDURE — 81001 URINALYSIS AUTO W/SCOPE: CPT

## 2018-09-17 PROCEDURE — 96361 HYDRATE IV INFUSION ADD-ON: CPT

## 2018-09-17 PROCEDURE — 74177 CT ABD & PELVIS W/CONTRAST: CPT

## 2018-09-17 PROCEDURE — 81025 URINE PREGNANCY TEST: CPT | Performed by: STUDENT IN AN ORGANIZED HEALTH CARE EDUCATION/TRAINING PROGRAM

## 2018-09-17 PROCEDURE — 36415 COLL VENOUS BLD VENIPUNCTURE: CPT | Performed by: STUDENT IN AN ORGANIZED HEALTH CARE EDUCATION/TRAINING PROGRAM

## 2018-09-17 PROCEDURE — 96374 THER/PROPH/DIAG INJ IV PUSH: CPT

## 2018-09-17 PROCEDURE — 80053 COMPREHEN METABOLIC PANEL: CPT | Performed by: STUDENT IN AN ORGANIZED HEALTH CARE EDUCATION/TRAINING PROGRAM

## 2018-09-17 PROCEDURE — 83690 ASSAY OF LIPASE: CPT | Performed by: STUDENT IN AN ORGANIZED HEALTH CARE EDUCATION/TRAINING PROGRAM

## 2018-09-17 RX ORDER — ALPRAZOLAM 2 MG/1
TABLET ORAL
COMMUNITY
Start: 2018-08-22 | End: 2018-09-17

## 2018-09-17 RX ORDER — MEDROXYPROGESTERONE ACETATE 150 MG/ML
INJECTION, SUSPENSION INTRAMUSCULAR
COMMUNITY
Start: 2018-08-27 | End: 2018-09-17

## 2018-09-17 RX ORDER — ALPRAZOLAM 2 MG/1
2 TABLET ORAL 3 TIMES DAILY
COMMUNITY
Start: 2018-03-10 | End: 2021-06-30

## 2018-09-17 RX ORDER — ONDANSETRON 2 MG/ML
4 INJECTION INTRAMUSCULAR; INTRAVENOUS ONCE
Status: COMPLETED | OUTPATIENT
Start: 2018-09-17 | End: 2018-09-17

## 2018-09-17 RX ORDER — FLUOXETINE HYDROCHLORIDE 20 MG/1
20 CAPSULE ORAL DAILY
COMMUNITY
Start: 2018-07-24 | End: 2019-08-27

## 2018-09-17 RX ORDER — BUPRENORPHINE HYDROCHLORIDE AND NALOXONE HYDROCHLORIDE 8.6; 2.1 MG/1; MG/1
2 TABLET, ORALLY DISINTEGRATING SUBLINGUAL EVERY MORNING
COMMUNITY
Start: 2018-08-22 | End: 2019-02-04 | Stop reason: SDUPTHER

## 2018-09-17 RX ORDER — PROMETHAZINE HYDROCHLORIDE 25 MG/1
25 TABLET ORAL EVERY 6 HOURS
COMMUNITY
End: 2019-09-19 | Stop reason: SDUPTHER

## 2018-09-17 RX ORDER — ACETAMINOPHEN 325 MG/1
975 TABLET ORAL ONCE
Status: COMPLETED | OUTPATIENT
Start: 2018-09-17 | End: 2018-09-17

## 2018-09-17 RX ADMIN — SODIUM CHLORIDE 1000 ML: 0.9 INJECTION, SOLUTION INTRAVENOUS at 21:38

## 2018-09-17 RX ADMIN — IOHEXOL 100 ML: 350 INJECTION, SOLUTION INTRAVENOUS at 21:47

## 2018-09-17 RX ADMIN — ACETAMINOPHEN 975 MG: 325 TABLET, FILM COATED ORAL at 22:05

## 2018-09-17 RX ADMIN — ONDANSETRON 4 MG: 2 INJECTION INTRAMUSCULAR; INTRAVENOUS at 21:38

## 2018-09-17 NOTE — PROGRESS NOTES
[facility]        NAME: Noah Baer is a 44 y o  female  : 1975    MRN: [id number]  DATE: 2018  TIME: 3:05 PM    Assessment and Plan   Left lower quadrant pain [R10 32]  1  Left lower quadrant pain  Transfer to other facility   2  Diarrhea, unspecified type  Transfer to other facility         Patient Instructions       Follow up with PCP in 3-5 days  Proceed to  ER if symptoms worsen  Chief Complaint     Chief Complaint   Patient presents with    Diarrhea     patient reports grandfather is in [place] [address] being treated for C-Diff, was caring for him prior, washing his soiled linen  the last 3 days has had a fever(102 2) lower left abdominal pain, chills, sweats, no appetite  last dose of Ibuprofen at 0600  History of Present Illness       78-year-old female presents with left lower abdominal pain with diarrhea for the past 3 days  Patient reports she has been taking care of her sick grandfather for the past several days who has been recently hospitalized for C diff  Patient for the 3 days prior to his hospitalization she was taking care of him and cleaning him and changing his linens and washing them  Three days ago patient started to develop left lower abdominal pain with diarrhea  Patient reports he is having approximately 10 bowel movements a day which were all loose and is now noticing blood in the stool and throughout the bowl when she has a bowel movement  Patient reports yesterday she was feeling very feverish  Patient also reports now she is feeling very weak and run down at this point time  Denies any chest pain cough or shortness of breath  No problems with urination  Diarrhea    The current episode started in the past 7 days  The problem occurs more than 10 times per day  The problem has been unchanged  The stool consistency is described as blood tinged, bloody and watery  The patient states that diarrhea awakens her from sleep   Associated symptoms include abdominal pain, chills, a fever, sweats and weight loss  Pertinent negatives include no bloating, coughing, increased  flatus, myalgias, URI or vomiting  Nothing aggravates the symptoms  Risk factors include ill contacts (Grandfather had C difficile)  She has tried nothing for the symptoms  The treatment provided no relief  Review of Systems   Review of Systems   Constitutional: Positive for appetite change, chills, fatigue, fever and weight loss  HENT: Negative  Eyes: Negative  Respiratory: Negative  Negative for cough  Cardiovascular: Negative  Gastrointestinal: Positive for abdominal pain and diarrhea  Negative for bloating, flatus and vomiting  Genitourinary: Negative  Musculoskeletal: Negative  Negative for myalgias  Skin: Negative  Neurological: Positive for weakness           Current Medications       Current Outpatient Prescriptions:     ALPRAZolam (XANAX) 2 MG tablet, , Disp: , Rfl:     FLUoxetine (PROzac) 20 mg capsule, , Disp: , Rfl:     medroxyPROGESTERone (DEPO-PROVERA) 150 mg/mL injection, Inject 1 mL (150 mg total) into the shoulder, thigh, or buttocks every 3 (three) months, Disp: 1 mL, Rfl: 5    promethazine (PHENERGAN) 25 mg tablet, Take 25 mg by mouth every 6 (six) hours, Disp: , Rfl:     ZUBSOLV 8 6-2 1 MG SUBL, , Disp: , Rfl:     Current Allergies     Allergies as of 09/17/2018 - Reviewed 09/17/2018   Allergen Reaction Noted    Sulfa antibiotics Anaphylaxis and Rash 09/28/2017    Codeine Other (See Comments) 08/28/2001    Morphine Other (See Comments) 08/28/2001    Tramadol  09/28/2017            The following portions of the patient's history were reviewed and updated as appropriate: allergies, current medications, past family history, past medical history, past social history, past surgical history and problem list      Past Medical History:   Diagnosis Date    Arthritis     Endometriosis     no current issues    Infectious viral hepatitis     history of hep c    Irritable bowel syndrome        Past Surgical History:   Procedure Laterality Date    APPENDECTOMY      CHOLECYSTECTOMY      HEMORRHOID SURGERY         Family History   Problem Relation Age of Onset    Cancer Father          Medications have been verified  Objective   /74   Pulse (!) 108   Temp 97 5 °F (36 4 °C)   Resp 20   Ht 5' 5" (1 651 m)   Wt 77 9 kg (171 lb 12 8 oz)   SpO2 97%   BMI 28 59 kg/m²        Physical Exam     Physical Exam   Constitutional: She is oriented to person, place, and time  She appears well-developed and well-nourished  No distress  HENT:   Head: Normocephalic and atraumatic  Right Ear: External ear normal    Left Ear: External ear normal    Nose: Nose normal    Mouth/Throat: Oropharynx is clear and moist  No oropharyngeal exudate  Eyes: Conjunctivae are normal  Right eye exhibits no discharge  Left eye exhibits no discharge  Neck: Normal range of motion  Neck supple  Cardiovascular: Normal rate, regular rhythm, normal heart sounds and intact distal pulses  No murmur heard  Pulmonary/Chest: Effort normal and breath sounds normal  No respiratory distress  She has no wheezes  She has no rales  Abdominal: Soft  Bowel sounds are normal  There is no hepatosplenomegaly  There is tenderness (Moderate to severe) in the left lower quadrant  There is no rigidity, no rebound, no guarding, no CVA tenderness, no tenderness at McBurney's point and negative Lott's sign  Musculoskeletal: Normal range of motion  Lymphadenopathy:     She has no cervical adenopathy  Neurological: She is alert and oriented to person, place, and time  Skin: Skin is warm and dry  Psychiatric: She has a normal mood and affect  Nursing note and vitals reviewed  Due to patient's history and physical family that she needs to be further eval and at the ER    Concerned about having C diff now she is getting dehydrated or possible anemia

## 2018-09-18 NOTE — ED ATTENDING ATTESTATION
Julio Cesar LOMBARDO, saw and evaluated the patient  I have discussed the patient with the resident/non-physician practitioner and agree with the resident's/non-physician practitioner's findings, Plan of Care, and MDM as documented in the resident's/non-physician practitioner's note, except where noted  All available labs and Radiology studies were reviewed  At this point I agree with the current assessment done in the Emergency Department  I have conducted an independent evaluation of this patient a history and physical is as follows:    A 72-year-old female with past medical history of hepatitis C; presents with diarrhea for the past three days  Patient reports having increasing number of bowel movements since onset  Patient also reports having generalized abdominal pain, however greatest in the lower quadrants  Patient states last evening she developed a fever, T-max 102°, for which she has taken Tylenol and Motrin  Patient also complains of nausea and 2 episodes of vomiting today  Patient has otherwise not had chest pain, shortness of breath, dysuria, urinary frequency/urgency, peripheral edema and rashes  Of note, 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 who is currently being treated for C diff      Physical Exam  General Appearance: alert and oriented, nad, non toxic appearing  Skin:  Warm, dry, intact  HEENT: atraumatic, normocephalic  Neck: Supple, trachea midline  Cardiac: RRR; no murmurs, rub, gallops  Pulmonary: lungs CTAB; no wheezes, rales, rhonchi  Gastrointestinal: abdomen soft, mild lower abdominal tenderness, nondistended; no guarding or rebound tenderness; good bowel sounds, no mass or bruits  Extremities:  no pedal edema, 2+ pulses; no calf tenderness, no clubbing, no cyanosis  Neuro:  no focal motor or sensory deficits, CN 2-12 grossly intact  Psych:  Normal mood and affect, normal judgement and insight    Assessment and Plan:  Diarrhea, associated with generalized abdominal cramping and fevers  Patient currently afebrile  Will check lab work for electrolyte abnormality, dehydration, renal impairment and infection  Will obtain CT scan to evaluate for infection, obstruction and abscess  Will send for stool studies including C diff  Will give IV fluid, Tylenol and Zofran        Critical Care Time  CritCare Time    Procedures

## 2018-09-18 NOTE — ED PROVIDER NOTES
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), anxiety, and depression who presents to the emergency department this evening with abdominal pain and diarrhea  Patient states that she has been taking care of her elderly grandfather who is C diff positive and she has been cleaning his linens and wiping him down after bowel movements  Patient developed diarrhea two days ago she had three bouts of nonbloody diarrhea  Yesterday she had five bouts of nonbloody diarrhea and today she had 13 episodes of bloody diarrhea  Patient also notes some nausea and nonbloody vomiting and reports a measured temperature of a 102 2° F at home  Patient denies any foreign travel or change in diet  She states that the abdominal pain is all throughout her abdomen but mainly in the left lower quadrant  She denies any recent changes in medication  Patient states she smokes about a pack of cigarettes per day, she denies any alcohol use, and denies any other drug use  Prior to Admission Medications   Prescriptions Last Dose Informant Patient Reported? Taking?    ALPRAZolam (XANAX) 2 MG tablet   Yes Yes   Si mg 3 (three) times a day     FLUoxetine (PROzac) 20 mg capsule   Yes Yes   Si mg daily     ZUBSOLV 8 6-2 1 MG SUBL   Yes Yes   Si tablets every morning     medroxyPROGESTERone (DEPO-PROVERA) 150 mg/mL injection   No Yes   Sig: Inject 1 mL (150 mg total) into the shoulder, thigh, or buttocks every 3 (three) months   promethazine (PHENERGAN) 25 mg tablet   Yes Yes   Sig: Take 25 mg by mouth every 6 (six) hours      Facility-Administered Medications: None       Past Medical History:   Diagnosis Date    Arthritis     Endometriosis     no current issues    Infectious viral hepatitis     history of hep c    Irritable bowel syndrome Past Surgical History:   Procedure Laterality Date    APPENDECTOMY      CHOLECYSTECTOMY      HEMORRHOID SURGERY         Family History   Problem Relation Age of Onset    Cancer Father      I have reviewed and agree with the history as documented  Social History   Substance Use Topics    Smoking status: Current Every Day Smoker    Smokeless tobacco: Never Used    Alcohol use No        Review of Systems   Constitutional: Positive for fever  Negative for chills and fatigue  HENT: Negative for congestion, rhinorrhea, sinus pressure and sore throat  Eyes: Negative for visual disturbance  Respiratory: Negative for cough and shortness of breath  Cardiovascular: Negative for chest pain  Gastrointestinal: Positive for abdominal pain, blood in stool, diarrhea, nausea and vomiting  Negative for constipation  Genitourinary: Negative for dysuria, frequency, hematuria and urgency  Musculoskeletal: Negative for arthralgias and myalgias  Skin: Negative for color change and rash  Neurological: Negative for dizziness, light-headedness and numbness  Physical Exam  ED Triage Vitals   Temperature Pulse Respirations Blood Pressure SpO2   09/17/18 1728 09/17/18 1728 09/17/18 1728 09/17/18 1728 09/17/18 1728   98 3 °F (36 8 °C) 82 18 139/82 99 %      Temp Source Heart Rate Source Patient Position - Orthostatic VS BP Location FiO2 (%)   09/17/18 1728 09/17/18 2107 09/17/18 2107 09/17/18 2107 --   Oral Monitor Lying Left arm       Pain Score       09/17/18 1728       No Pain           Orthostatic Vital Signs  Vitals:    09/17/18 1728 09/17/18 2107 09/17/18 2238   BP: 139/82 121/60 113/68   Pulse: 82 90 75   Patient Position - Orthostatic VS:  Lying Lying       Physical Exam   Constitutional: She is oriented to person, place, and time  She appears well-developed and well-nourished  No distress  HENT:   Head: Normocephalic and atraumatic     Eyes: Conjunctivae and EOM are normal  Pupils are equal, round, and reactive to light  Right eye exhibits no discharge  Left eye exhibits no discharge  No scleral icterus  Neck: Normal range of motion  Neck supple  No JVD present  Cardiovascular: Normal rate, regular rhythm and normal heart sounds  Exam reveals no gallop and no friction rub  No murmur heard  Pulmonary/Chest: Effort normal and breath sounds normal  No stridor  No respiratory distress  She has no wheezes  She has no rales  Abdominal: Soft  Bowel sounds are normal  She exhibits no distension  There is tenderness (Diffuse tenderness to palpation that is worst in the left lower quadrant)  There is no guarding  Musculoskeletal: Normal range of motion  She exhibits no edema, tenderness or deformity  Neurological: She is alert and oriented to person, place, and time  No cranial nerve deficit or sensory deficit  She exhibits normal muscle tone  Skin: Skin is warm and dry  No rash noted  She is not diaphoretic  No erythema  No pallor  Psychiatric: She has a normal mood and affect  Her behavior is normal    Nursing note and vitals reviewed        ED Medications  Medications   acetaminophen (TYLENOL) tablet 975 mg (975 mg Oral Given 9/17/18 2205)   ondansetron (ZOFRAN) injection 4 mg (4 mg Intravenous Given 9/17/18 2138)   sodium chloride 0 9 % bolus 1,000 mL (0 mL Intravenous Stopped 9/17/18 2239)   iohexol (OMNIPAQUE) 350 MG/ML injection (MULTI-DOSE) 100 mL (100 mL Intravenous Given 9/17/18 2147)       Diagnostic Studies  Results Reviewed     Procedure Component Value Units Date/Time    POCT pregnancy, urine [[id number]]  (Normal) Resulted:  09/17/18 2137    Lab Status:  Final result Updated:  09/17/18 2137     EXT PREG TEST UR (Ref: Negative) negative    Comprehensive metabolic panel [[id number]]  (Abnormal) Collected:  09/17/18 2057    Lab Status:  Final result Specimen:  Blood from Arm, Right Updated:  09/17/18 2132     Sodium 139 mmol/L      Potassium 3 8 mmol/L      Chloride 104 mmol/L      CO2 28 mmol/L      ANION GAP 7 mmol/L      BUN 15 mg/dL      Creatinine 0 68 mg/dL      Glucose 88 mg/dL      Calcium 9 0 mg/dL      AST 18 U/L      ALT 22 U/L      Alkaline Phosphatase 38 (L) U/L      Total Protein 7 8 g/dL      Albumin 4 1 g/dL      Total Bilirubin 0 43 mg/dL      eGFR 108 ml/min/1 73sq m     Narrative:         National Kidney Disease Education Program recommendations are as follows:  GFR calculation is accurate only with a steady state creatinine  Chronic Kidney disease less than 60 ml/min/1 73 sq  meters  Kidney failure less than 15 ml/min/1 73 sq  meters      Lipase [[id number]]  (Normal) Collected:  09/17/18 2057    Lab Status:  Final result Specimen:  Blood from Arm, Right Updated:  09/17/18 2132     Lipase 106 u/L     CBC and differential [[id number]]  (Abnormal) Collected:  09/17/18 2057    Lab Status:  Final result Specimen:  Blood from Arm, Right Updated:  09/17/18 2108     WBC 6 63 Thousand/uL      RBC 4 67 Million/uL      Hemoglobin 13 6 g/dL      Hematocrit 41 0 %      MCV 88 fL      MCH 29 1 pg      MCHC 33 2 g/dL      RDW 12 0 %      MPV 10 6 fL      Platelets 511 Thousands/uL      nRBC 0 /100 WBCs      Neutrophils Relative 47 %      Immat GRANS % 0 %      Lymphocytes Relative 45 (H) %      Monocytes Relative 5 %      Eosinophils Relative 2 %      Basophils Relative 1 %      Neutrophils Absolute 3 17 Thousands/µL      Immature Grans Absolute 0 01 Thousand/uL      Lymphocytes Absolute 2 96 Thousands/µL      Monocytes Absolute 0 31 Thousand/µL      Eosinophils Absolute 0 15 Thousand/µL      Basophils Absolute 0 03 Thousands/µL     Urine Microscopic [[id number]]  (Abnormal) Collected:  09/17/18 2033    Lab Status:  Final result Specimen:  Urine from Urine, Clean Catch Updated:  09/17/18 2053     RBC, UA 0-1 (A) /hpf      WBC, UA 1-2 (A) /hpf      Epithelial Cells Occasional /hpf      Bacteria, UA Occasional /hpf     Stool Enteric Bacterial Panel by PCR [[id number]]     Lab Status:  No result Specimen:  Stool     Fecal leukocytes [[id number]]     Lab Status:  No result Specimen:  Stool     Clostridium difficile toxin by PCR [[id number]]     Lab Status:  No result Specimen:  Stool from Per Rectum     Ova and parasite examination [[id number]]     Lab Status:  No result Specimen:  Stool from Rectum     ED Urine Macroscopic [[id number]]  (Abnormal) Collected:  09/17/18 2033    Lab Status:  Final result Specimen:  Urine Updated:  09/17/18 2023     Color, UA Yellow     Clarity, UA Clear     pH, UA 5 5     Leukocytes, UA Small (A)     Nitrite, UA Negative     Protein, UA Negative mg/dl      Glucose, UA Negative mg/dl      Ketones, UA Negative mg/dl      Urobilinogen, UA 0 2 E U /dl      Bilirubin, UA Negative     Blood, UA Small (A)     Specific [place], UA 1 020    Narrative:       CLINITEK RESULT                 CT abdomen pelvis with contrast   Final Result by Shanthi Mireles DO (09/17 2157)      Large amount fecal material within the colon which may represent constipation  Status post cholecystectomy with intrahepatic and extrahepatic biliary ductal dilatation which may represent postcholecystectomy state  Prominent pancreatic duct for which nonemergent CT scan or MRI pancreatic protocol can be obtained if clinically warranted  The study was marked in EPIC for significant notification  Workstation performed: [id number]               Procedures  Procedures      Phone Consults  ED Phone Contact    ED Course                               MDM  Number of Diagnoses or Management Options  Abdominal pain:   Diarrhea:   Diagnosis management comments: Patient's lab work and CT scan were unremarkable  She was unable to provide a stool sample here in the emergency department  Do not suspect this is C diff at this time as her white count is normal as well   We will provide the patient with supplies necessary to provide outpatient stool samples and have her follow up with the primary care physician if symptoms do not improve  Patient also instructed to return to the emergency department should she develop any new or concerning symptoms  CritCare Time    Disposition  Final diagnoses:   Diarrhea   Abdominal pain     Time reflects when diagnosis was documented in both MDM as applicable and the Disposition within this note     Time User Action Codes Description Comment    9/17/2018 11:15 PM Sylmar Kevin Add [R19 7] Diarrhea     9/17/2018 11:16 PM Guru Kevin Add [R10 9] Abdominal pain       ED Disposition     ED Disposition Condition Comment    Discharge  Yusuf Pires discharge to home/self care  Condition at discharge: Good        Follow-up Information     Follow up With Specialties Details Why Contact Info Additional Chalino hWiting MD Internal Medicine   [id number] [facility] [address] [phone] [facility] Emergency Department Emergency Medicine  If symptoms worsen [address] [facility]  [phone] [place] ED, [address] [place] [address], [phone]          Discharge Medication List as of 9/17/2018 11:19 PM      CONTINUE these medications which have NOT CHANGED    Details   ALPRAZolam (XANAX) 2 MG tablet 2 mg 3 (three) times a day  , Starting Sat 3/10/2018, Historical Med      FLUoxetine (PROzac) 20 mg capsule 20 mg daily  , Starting Tue 7/24/2018, Historical Med      medroxyPROGESTERone (DEPO-PROVERA) 150 mg/mL injection Inject 1 mL (150 mg total) into the shoulder, thigh, or buttocks every 3 (three) months, Starting Mon 6/11/2018, Normal      promethazine (PHENERGAN) 25 mg tablet Take 25 mg by mouth every 6 (six) hours, Historical Med      ZUBSOLV 8 6-2 1 MG SUBL 2 tablets every morning  , Starting Wed 8/22/2018, Historical Med             Outpatient Discharge Orders  Stool Enteric Bacterial Panel by PCR   Standing Status: Future  Standing Exp   Date: 09/17/19     FECAL LEUKOCYTES   Standing Status: Future Standing Exp  Date: 09/17/19     C difficile, Cytotoxin B   Standing Status: Future  Standing Exp  Date: 09/17/19     Ova and parasite examination   Standing Status: Future  Standing Exp  Date: 09/17/19         ED Provider  Attending physically available and evaluated Isabel Marie I managed the patient along with the ED Attending      Electronically Signed by         Batsheva Hooks MD  09/17/18 0259

## 2018-09-18 NOTE — ED NOTES
Patient attempted to use restroom  Patient unable to provide stool sample at this time  Will follow up with patient        Augusto Ford RN  09/17/18 [id number]

## 2018-09-18 NOTE — DISCHARGE INSTRUCTIONS

## 2018-10-15 ENCOUNTER — OFFICE VISIT (OUTPATIENT)
Dept: OBGYN CLINIC | Facility: CLINIC | Age: 43
End: 2018-10-15
Payer: COMMERCIAL

## 2018-10-15 VITALS
DIASTOLIC BLOOD PRESSURE: 74 MMHG | HEART RATE: 87 BPM | BODY MASS INDEX: 28.76 KG/M2 | SYSTOLIC BLOOD PRESSURE: 113 MMHG | WEIGHT: 172.8 LBS

## 2018-10-15 DIAGNOSIS — B96.89 BACTERIAL VAGINOSIS: ICD-10-CM

## 2018-10-15 DIAGNOSIS — R39.9 UTI SYMPTOMS: ICD-10-CM

## 2018-10-15 DIAGNOSIS — N30.01 ACUTE CYSTITIS WITH HEMATURIA: Primary | ICD-10-CM

## 2018-10-15 DIAGNOSIS — N76.0 BACTERIAL VAGINOSIS: ICD-10-CM

## 2018-10-15 LAB
BV WHIFF TEST VAG QL: POSITIVE
CLUE CELLS SPEC QL WET PREP: POSITIVE
PH SMN: 5.5 [PH]
SL AMB  POCT GLUCOSE, UA: NEGATIVE
SL AMB LEUKOCYTE ESTERASE,UA: ABNORMAL
SL AMB POCT BILIRUBIN,UA: NEGATIVE
SL AMB POCT BLOOD,UA: ABNORMAL
SL AMB POCT CLARITY,UA: ABNORMAL
SL AMB POCT COLOR,UA: ABNORMAL
SL AMB POCT KETONES,UA: NEGATIVE
SL AMB POCT NITRITE,UA: POSITIVE
SL AMB POCT PH,UA: 5
SL AMB POCT SPECIFIC GRAVITY,UA: 1.03
SL AMB POCT URINE PROTEIN: 1
SL AMB POCT UROBILINOGEN: 0.2
SL AMB POCT WET MOUNT: ABNORMAL
T VAGINALIS RRNA SPEC QL NAA+PROBE: NEGATIVE
YEAST VAG QL WET PREP: NEGATIVE

## 2018-10-15 PROCEDURE — 87210 SMEAR WET MOUNT SALINE/INK: CPT | Performed by: NURSE PRACTITIONER

## 2018-10-15 PROCEDURE — 87086 URINE CULTURE/COLONY COUNT: CPT | Performed by: NURSE PRACTITIONER

## 2018-10-15 PROCEDURE — 99213 OFFICE O/P EST LOW 20 MIN: CPT | Performed by: NURSE PRACTITIONER

## 2018-10-15 PROCEDURE — 87077 CULTURE AEROBIC IDENTIFY: CPT | Performed by: NURSE PRACTITIONER

## 2018-10-15 PROCEDURE — 87186 SC STD MICRODIL/AGAR DIL: CPT | Performed by: NURSE PRACTITIONER

## 2018-10-15 PROCEDURE — 81002 URINALYSIS NONAUTO W/O SCOPE: CPT | Performed by: NURSE PRACTITIONER

## 2018-10-15 PROCEDURE — 81001 URINALYSIS AUTO W/SCOPE: CPT | Performed by: NURSE PRACTITIONER

## 2018-10-15 RX ORDER — METRONIDAZOLE 500 MG/1
500 TABLET ORAL EVERY 12 HOURS SCHEDULED
Qty: 14 TABLET | Refills: 0 | Status: SHIPPED | OUTPATIENT
Start: 2018-10-15 | End: 2018-10-22

## 2018-10-15 RX ORDER — NITROFURANTOIN 25; 75 MG/1; MG/1
100 CAPSULE ORAL 2 TIMES DAILY
Qty: 6 CAPSULE | Refills: 0 | Status: SHIPPED | OUTPATIENT
Start: 2018-10-15 | End: 2019-02-04 | Stop reason: ALTCHOICE

## 2018-10-15 NOTE — PATIENT INSTRUCTIONS
Take Macrobid as directed  Increase water intakeTake Metronidazole as directed  Call with needs or concerns  Annual GYN exam is due in February

## 2018-10-15 NOTE — PROGRESS NOTES
Assessment/Plan:         Diagnoses and all orders for this visit:    Acute cystitis with hematuria  -     UA w Reflex to Microscopic w Reflex to Culture  -     Urine culture  -     nitrofurantoin (MACROBID) 100 mg capsule; Take 1 capsule (100 mg total) by mouth 2 (two) times a day    UTI symptoms  -     POCT urine dip  -     nitrofurantoin (MACROBID) 100 mg capsule; Take 1 capsule (100 mg total) by mouth 2 (two) times a day    Bacterial vaginosis  -     metroNIDAZOLE (FLAGYL) 500 mg tablet; Take 1 tablet (500 mg total) by mouth every 12 (twelve) hours for 7 days  -     POCT wet mount        Plan  Take Macrobid as directed  Increase water intakeTake Metronidazole as directed  Call with needs or concerns  Annual GYN exam is due in February  Pt verbalized understanding of all discussed  Subjective:      Patient ID: Yolanda Leija is a 44 y o  female  HPI   Pt states she has noted intermittent vaginal discharge and odor for several months  Pt also states she noted burning with urination and frequency with urination  Explained wet mount was positive for BV, safe and effective use of Metronidazole provided  Explained urine dip was positive for UTI, safe and effective use of Macrobid provided  Encouraged to increase water intake      The following portions of the patient's history were reviewed and updated as appropriate: allergies, current medications, past family history, past medical history, past social history, past surgical history and problem list     Review of Systems    Negative except for UTI symptoms and intermittent vaginal discharge with an odor    Objective:      /74 (BP Location: Right arm, Patient Position: Sitting, Cuff Size: Standard)   Pulse 87   Wt 78 4 kg (172 lb 12 8 oz)   BMI 28 76 kg/m²          Physical Exam    Alert and oriented  Denies pain  Vulva negative lesion or erythema  Vagina positive thin/gray white discharge  Cervix negative lesions, thin gray/white discharge  Wet mount positive for BV  Urine dip positive for UTI

## 2018-10-16 LAB
BACTERIA UR QL AUTO: ABNORMAL /HPF
BILIRUB UR QL STRIP: NEGATIVE
CLARITY UR: ABNORMAL
COLOR UR: ABNORMAL
GLUCOSE UR STRIP-MCNC: NEGATIVE MG/DL
HGB UR QL STRIP.AUTO: ABNORMAL
HYALINE CASTS #/AREA URNS LPF: ABNORMAL /LPF
KETONES UR STRIP-MCNC: NEGATIVE MG/DL
LEUKOCYTE ESTERASE UR QL STRIP: ABNORMAL
NITRITE UR QL STRIP: POSITIVE
NON-SQ EPI CELLS URNS QL MICRO: ABNORMAL /HPF
PH UR STRIP.AUTO: 5.5 [PH] (ref 4.5–8)
PROT UR STRIP-MCNC: ABNORMAL MG/DL
RBC #/AREA URNS AUTO: ABNORMAL /HPF
SP GR UR STRIP.AUTO: 1.03 (ref 1–1.03)
UROBILINOGEN UR QL STRIP.AUTO: 0.2 E.U./DL
WBC #/AREA URNS AUTO: ABNORMAL /HPF

## 2018-10-18 LAB — BACTERIA UR CULT: ABNORMAL

## 2018-11-20 ENCOUNTER — CLINICAL SUPPORT (OUTPATIENT)
Dept: OBGYN CLINIC | Facility: CLINIC | Age: 43
End: 2018-11-20
Payer: COMMERCIAL

## 2018-11-20 DIAGNOSIS — Z30.42 ENCOUNTER FOR SURVEILLANCE OF INJECTABLE CONTRACEPTIVE: Primary | ICD-10-CM

## 2018-11-20 PROCEDURE — 96372 THER/PROPH/DIAG INJ SC/IM: CPT

## 2018-11-20 RX ORDER — MEDROXYPROGESTERONE ACETATE 150 MG/ML
150 INJECTION, SUSPENSION INTRAMUSCULAR ONCE
Status: COMPLETED | OUTPATIENT
Start: 2018-11-20 | End: 2018-11-20

## 2018-11-20 RX ADMIN — MEDROXYPROGESTERONE ACETATE 150 MG: 150 INJECTION, SUSPENSION INTRAMUSCULAR at 15:54

## 2019-01-07 ENCOUNTER — OFFICE VISIT (OUTPATIENT)
Dept: URGENT CARE | Age: 44
End: 2019-01-07
Payer: COMMERCIAL

## 2019-01-07 ENCOUNTER — APPOINTMENT (OUTPATIENT)
Dept: RADIOLOGY | Age: 44
End: 2019-01-07
Payer: COMMERCIAL

## 2019-01-07 VITALS
RESPIRATION RATE: 16 BRPM | BODY MASS INDEX: 29.66 KG/M2 | WEIGHT: 178 LBS | DIASTOLIC BLOOD PRESSURE: 82 MMHG | HEART RATE: 100 BPM | OXYGEN SATURATION: 99 % | HEIGHT: 65 IN | TEMPERATURE: 97.2 F | SYSTOLIC BLOOD PRESSURE: 142 MMHG

## 2019-01-07 DIAGNOSIS — J18.9 PNEUMONIA OF RIGHT LOWER LOBE DUE TO INFECTIOUS ORGANISM: Primary | ICD-10-CM

## 2019-01-07 DIAGNOSIS — R05.9 COUGH: ICD-10-CM

## 2019-01-07 PROCEDURE — 99213 OFFICE O/P EST LOW 20 MIN: CPT | Performed by: PHYSICIAN ASSISTANT

## 2019-01-07 PROCEDURE — 71046 X-RAY EXAM CHEST 2 VIEWS: CPT

## 2019-01-07 RX ORDER — TEMAZEPAM 30 MG/1
30 CAPSULE ORAL DAILY PRN
COMMUNITY
End: 2019-02-04 | Stop reason: ALTCHOICE

## 2019-01-07 RX ORDER — GUAIFENESIN 600 MG
1200 TABLET, EXTENDED RELEASE 12 HR ORAL EVERY 12 HOURS SCHEDULED
Qty: 8 TABLET | Refills: 0 | Status: SHIPPED | OUTPATIENT
Start: 2019-01-07 | End: 2019-02-04 | Stop reason: ALTCHOICE

## 2019-01-07 RX ORDER — PROCHLORPERAZINE MALEATE 5 MG/1
TABLET ORAL
COMMUNITY
Start: 2015-08-24 | End: 2019-02-04 | Stop reason: ALTCHOICE

## 2019-01-07 RX ORDER — ALBUTEROL SULFATE 90 UG/1
2 AEROSOL, METERED RESPIRATORY (INHALATION) EVERY 6 HOURS PRN
Qty: 18 G | Refills: 0 | Status: SHIPPED | OUTPATIENT
Start: 2019-01-07 | End: 2019-08-27

## 2019-01-07 RX ORDER — PROMETHAZINE HYDROCHLORIDE 25 MG/1
25 TABLET ORAL EVERY 6 HOURS PRN
COMMUNITY
End: 2019-02-04 | Stop reason: SDUPTHER

## 2019-01-07 RX ORDER — ALBUTEROL SULFATE 90 UG/1
AEROSOL, METERED RESPIRATORY (INHALATION)
COMMUNITY
Start: 2018-04-22 | End: 2019-01-07 | Stop reason: ALTCHOICE

## 2019-01-07 RX ORDER — PREDNISONE 20 MG/1
40 TABLET ORAL DAILY
Qty: 10 TABLET | Refills: 0 | Status: SHIPPED | OUTPATIENT
Start: 2019-01-07 | End: 2019-01-12

## 2019-01-07 RX ORDER — AZITHROMYCIN 250 MG/1
TABLET, FILM COATED ORAL
Qty: 6 TABLET | Refills: 0 | Status: SHIPPED | OUTPATIENT
Start: 2019-01-07 | End: 2019-01-11

## 2019-01-07 NOTE — PATIENT INSTRUCTIONS
Take medication as directed  Continue to monitor symptoms  If new or worsening symptoms occur go immediately to ER  Drink plenty of fluids  Community Acquired Pneumonia   WHAT YOU NEED TO KNOW:   Community-acquired pneumonia (CAP) is a lung infection that you get outside of a hospital or nursing home setting  Your lungs become inflamed and cannot work well  CAP may be caused by bacteria, viruses, or fungi  DISCHARGE INSTRUCTIONS:   Return to the emergency department if:   · You are confused and cannot think clearly  · You have increased trouble breathing  · Your lips or fingernails turn gray or blue  Contact your healthcare provider if:   · Your symptoms do not get better, or they get worse  · You are urinating less, or not at all  · You have questions or concerns about your condition or care  Medicines:   · Medicines  may be given to treat a bacterial, viral, or fungal infection  You may also be given medicines to dilate your bronchial tubes to help you breathe more easily  · Take your medicine as directed  Contact your healthcare provider if you think your medicine is not helping or if you have side effects  Tell him or her if you are allergic to any medicine  Keep a list of the medicines, vitamins, and herbs you take  Include the amounts, and when and why you take them  Bring the list or the pill bottles to follow-up visits  Carry your medicine list with you in case of an emergency  Follow up with your healthcare provider within 3 days or as directed: You may need another x-ray  Write down your questions so you remember to ask them during your visits  Deep breathing and coughing:  Deep breathing helps open the air passages in your lungs  Coughing helps bring up mucus from your lungs  Take a deep breath and hold the breath as long as you can  Then push the air out of your lungs with a deep, strong cough  Spit out any mucus you have coughed up   Take 10 deep breaths in a row every hour that you are awake  Remember to follow each deep breath with a cough  Do not smoke or allow others to smoke around you:  Nicotine and other chemicals in cigarettes and cigars can cause lung damage  Ask your healthcare provider for information if you currently smoke and need help to quit  E-cigarettes or smokeless tobacco still contain nicotine  Talk to your healthcare provider before you use these products  Manage CAP at home:   · Breathe warm, moist air  This helps loosen mucus  Loosely place a warm, wet washcloth over your nose and mouth  A room humidifier may also help make the air moist     · Drink liquids as directed  Ask your healthcare provider how much liquid to drink each day and which liquids to drink  Liquids help make mucus thin and easier to get out of your body  · Gently tap your chest   This helps loosen mucus so it is easier to cough  Lie with your head lower than your chest several times a day and tap your chest      · Get plenty of rest   Rest helps your body heal   Prevent CAP:   · Wash your hands often with soap and water  Carry germ-killing hand gel with you  You can use the gel to clean your hands when soap and water are not available  Do not touch your eyes, nose, or mouth unless you have washed your hands first      · Clean surfaces often  Clean doorknobs, countertops, cell phones, and other surfaces that are touched often  · Always cover your mouth when you cough  Cough into a tissue or your shirtsleeve so you do not spread germs from your hands  · Try to avoid people who have a cold or the flu  If you are sick, stay away from others as much as possible  · Ask about vaccines  You may need a vaccine to help prevent pneumonia  Get an influenza (flu) vaccine every year as soon as it becomes available  © 2017 [employer] Information is for End User's use only and may not be sold, redistributed or otherwise used for commercial purposes   All illustrations and images included in CareNotes® are the copyrighted property of A [employer]  or Ted Canela  The above information is an  only  It is not intended as medical advice for individual conditions or treatments  Talk to your doctor, nurse or pharmacist before following any medical regimen to see if it is safe and effective for you

## 2019-01-07 NOTE — PROGRESS NOTES
[facility]        NAME: Matt Milner is a 44 y o  female  : 1975    MRN: [id number]  DATE: 2019  TIME: 4:17 PM    Assessment and Plan   Pneumonia of right lower lobe due to infectious organism ([facility] [address] ) [J18 1]  1  Pneumonia of right lower lobe due to infectious organism ([facility] [address] )  XR chest pa & lateral    albuterol (VENTOLIN HFA) 90 mcg/act inhaler    predniSONE 20 mg tablet    azithromycin (ZITHROMAX) 250 mg tablet    guaiFENesin (MUCINEX) 600 mg 12 hr tablet     Educated patient on indications go to E R  Patient states she understands and agrees    Patient Instructions       Take medications as directed  Drink plenty of fluids  Follow up with family doctor this week  Go to ER immediately if new or worsening symptoms occur  Chief Complaint     Chief Complaint   Patient presents with    Cold Like Symptoms      Pt c/o productive cough, chest congestion, fever, chills, body aches, denies N/V/D  Pt has been taking Dayquil and Benadryl  History of Present Illness       Cough   This is a new problem  Episode onset: 1 5 weeks ago  The problem has been gradually worsening  The problem occurs every few minutes  The cough is productive of brown sputum  Associated symptoms include nasal congestion, postnasal drip, a sore throat and wheezing  Pertinent negatives include no chest pain, chills, ear pain, fever, headaches, myalgias, rash, rhinorrhea, shortness of breath or sweats  Nothing aggravates the symptoms  Risk factors for lung disease include smoking/tobacco exposure  She has tried nothing for the symptoms  The treatment provided no relief  Her past medical history is significant for pneumonia (last year)  Review of Systems   Review of Systems   Constitutional: Negative for chills, diaphoresis, fatigue and fever  HENT: Positive for postnasal drip, sinus pain, sinus pressure, sneezing and sore throat  Negative for congestion, ear pain, rhinorrhea and voice change  Eyes: Negative  Respiratory: Positive for cough and wheezing  Negative for chest tightness and shortness of breath  Cardiovascular: Negative for chest pain and palpitations  Gastrointestinal: Negative for constipation, diarrhea, nausea and vomiting  Endocrine: Negative  Genitourinary: Negative for dysuria  Musculoskeletal: Negative for back pain, myalgias and neck pain  Skin: Negative for pallor and rash  Allergic/Immunologic: Negative  Neurological: Negative for dizziness, syncope and headaches  Hematological: Negative  Psychiatric/Behavioral: Negative            Current Medications       Current Outpatient Prescriptions:     ALPRAZolam (XANAX) 2 MG tablet, 2 mg 3 (three) times a day  , Disp: , Rfl:     FLUoxetine (PROzac) 20 mg capsule, 20 mg daily  , Disp: , Rfl:     promethazine (PHENERGAN) 25 mg tablet, Take 25 mg by mouth every 6 (six) hours, Disp: , Rfl:     promethazine (PHENERGAN) 25 mg tablet, Take 25 mg by mouth every 6 (six) hours as needed, Disp: , Rfl:     ZUBSOLV 8 6-2 1 MG SUBL, 2 tablets every morning  , Disp: , Rfl:     albuterol (VENTOLIN HFA) 90 mcg/act inhaler, Inhale 2 puffs every 6 (six) hours as needed for wheezing, Disp: 18 g, Rfl: 0    azithromycin (ZITHROMAX) 250 mg tablet, Take 2 tablets today then 1 tablet daily x 4 days, Disp: 6 tablet, Rfl: 0    Buprenorphine HCl-Naloxone HCl (ZUBSOLV) 8 6-2 1 MG SUBL, , Disp: , Rfl:     guaiFENesin (MUCINEX) 600 mg 12 hr tablet, Take 2 tablets (1,200 mg total) by mouth every 12 (twelve) hours, Disp: 8 tablet, Rfl: 0    medroxyPROGESTERone (DEPO-PROVERA) 150 mg/mL injection, Inject 1 mL (150 mg total) into the shoulder, thigh, or buttocks every 3 (three) months, Disp: 1 mL, Rfl: 5    nitrofurantoin (MACROBID) 100 mg capsule, Take 1 capsule (100 mg total) by mouth 2 (two) times a day (Patient not taking: Reported on 1/7/2019 ), Disp: 6 capsule, Rfl: 0    predniSONE 20 mg tablet, Take 2 tablets (40 mg total) by mouth daily for 5 days, Disp: 10 tablet, Rfl: 0    prochlorperazine (COMPAZINE) 5 mg tablet, take 1 tablet by oral route  every 8 hours as needed for nausea and vomiting  Take with food, Disp: , Rfl:     temazepam (RESTORIL) 30 mg capsule, Take 30 mg by mouth daily as needed, Disp: , Rfl:     Current Allergies     Allergies as of 01/07/2019 - Reviewed 01/07/2019   Allergen Reaction Noted    Sulfa antibiotics Anaphylaxis and Rash 09/28/2017    Ciprofloxacin  01/07/2019    Codeine Other (See Comments) 08/28/2001    Morphine Other (See Comments) 08/28/2001    Tramadol  09/28/2017            The following portions of the patient's history were reviewed and updated as appropriate: allergies, current medications, past family history, past medical history, past social history, past surgical history and problem list      Past Medical History:   Diagnosis Date    Arthritis     Endometriosis     no current issues    Infectious viral hepatitis     history of hep c    Irritable bowel syndrome        Past Surgical History:   Procedure Laterality Date    APPENDECTOMY      CHOLECYSTECTOMY      HEMORRHOID SURGERY         Family History   Problem Relation Age of Onset    Cancer Father          Medications have been verified  Objective   /82   Pulse 100   Temp (!) 97 2 °F (36 2 °C)   Resp 16   Ht 5' 5" (1 651 m)   Wt 80 7 kg (178 lb)   SpO2 99%   BMI 29 62 kg/m²        Physical Exam     Physical Exam   Constitutional: She appears well-developed and well-nourished  No distress  HENT:   Head: Normocephalic and atraumatic  Right Ear: External ear normal    Left Ear: External ear normal    TM intact and pearly bilaterally  Clear nasal discharge bilaterally  Swollen turbinates  Postnasal discharge and erythematous posterior pharynx  Eyes: Conjunctivae are normal  Right eye exhibits no discharge  Left eye exhibits no discharge  Neck: Normal range of motion  Neck supple     Cardiovascular: Normal rate, regular rhythm, normal heart sounds and intact distal pulses  Pulmonary/Chest: Effort normal  No respiratory distress  She has wheezes (Diffuse)  Ronchi in RLL   Lymphadenopathy:     She has no cervical adenopathy  Skin: Skin is warm  No rash noted  She is not diaphoretic  Nursing note and vitals reviewed

## 2019-02-04 ENCOUNTER — OFFICE VISIT (OUTPATIENT)
Dept: URGENT CARE | Age: 44
End: 2019-02-04
Payer: COMMERCIAL

## 2019-02-04 VITALS
BODY MASS INDEX: 29.56 KG/M2 | OXYGEN SATURATION: 98 % | TEMPERATURE: 97.8 F | RESPIRATION RATE: 20 BRPM | WEIGHT: 177.4 LBS | DIASTOLIC BLOOD PRESSURE: 70 MMHG | SYSTOLIC BLOOD PRESSURE: 131 MMHG | HEIGHT: 65 IN | HEART RATE: 93 BPM

## 2019-02-04 DIAGNOSIS — N30.01 ACUTE CYSTITIS WITH HEMATURIA: Primary | ICD-10-CM

## 2019-02-04 DIAGNOSIS — R30.0 DYSURIA: ICD-10-CM

## 2019-02-04 LAB
SL AMB  POCT GLUCOSE, UA: NEGATIVE
SL AMB LEUKOCYTE ESTERASE,UA: ABNORMAL
SL AMB POCT BILIRUBIN,UA: ABNORMAL
SL AMB POCT BLOOD,UA: ABNORMAL
SL AMB POCT CLARITY,UA: ABNORMAL
SL AMB POCT COLOR,UA: ABNORMAL
SL AMB POCT KETONES,UA: NEGATIVE
SL AMB POCT NITRITE,UA: NEGATIVE
SL AMB POCT PH,UA: 5
SL AMB POCT SPECIFIC GRAVITY,UA: 1.03
SL AMB POCT URINE PROTEIN: 300
SL AMB POCT UROBILINOGEN: 0.2

## 2019-02-04 PROCEDURE — 81002 URINALYSIS NONAUTO W/O SCOPE: CPT | Performed by: NURSE PRACTITIONER

## 2019-02-04 PROCEDURE — 87086 URINE CULTURE/COLONY COUNT: CPT | Performed by: NURSE PRACTITIONER

## 2019-02-04 PROCEDURE — 99213 OFFICE O/P EST LOW 20 MIN: CPT | Performed by: NURSE PRACTITIONER

## 2019-02-04 RX ORDER — PHENAZOPYRIDINE HYDROCHLORIDE 100 MG/1
100 TABLET, FILM COATED ORAL 3 TIMES DAILY PRN
Qty: 10 TABLET | Refills: 0 | Status: SHIPPED | OUTPATIENT
Start: 2019-02-04 | End: 2019-08-27

## 2019-02-04 RX ORDER — NITROFURANTOIN 25; 75 MG/1; MG/1
100 CAPSULE ORAL 2 TIMES DAILY
Qty: 14 CAPSULE | Refills: 0 | Status: SHIPPED | OUTPATIENT
Start: 2019-02-04 | End: 2019-02-11

## 2019-02-04 NOTE — PROGRESS NOTES
[facility]        NAME: Fco Abdalla is a 44 y o  female  : 1975    MRN: [id number]  DATE: 2019  TIME: 1:43 PM    Assessment and Plan   Acute cystitis with hematuria [N30 01]  1  Acute cystitis with hematuria  nitrofurantoin (MACROBID) 100 mg capsule    phenazopyridine (PYRIDIUM) 100 mg tablet   2  Dysuria  POCT urine dip    Urine culture         Patient Instructions     Medication as prescribed / discussed  Increase fluids  Continue to monitor  Follow up with PCP in 3-5 days  Proceed to  ER if symptoms worsen  Chief Complaint     Chief Complaint   Patient presents with    Possible UTI     Pt reports two day hx of hematuria, pelvic pain/pressure and burning with urination, neg back pain  No OTC meds taken  History of Present Illness       42-year-old female presenting today with c/o dysuria, increased urinary frequency and urgency, and pelvic pressure starting yesterday  No f/c  No flank pain  No other complaints  Review of Systems   Review of Systems   Constitutional: Negative  Respiratory: Negative  Cardiovascular: Negative  Genitourinary: Positive for dysuria, frequency, pelvic pain and urgency  Negative for flank pain           Current Medications       Current Outpatient Prescriptions:     albuterol (VENTOLIN HFA) 90 mcg/act inhaler, Inhale 2 puffs every 6 (six) hours as needed for wheezing, Disp: 18 g, Rfl: 0    ALPRAZolam (XANAX) 2 MG tablet, 2 mg 3 (three) times a day  , Disp: , Rfl:     Buprenorphine HCl-Naloxone HCl (ZUBSOLV) 8 6-2 1 MG SUBL, , Disp: , Rfl:     FLUoxetine (PROzac) 20 mg capsule, 20 mg daily  , Disp: , Rfl:     medroxyPROGESTERone (DEPO-PROVERA) 150 mg/mL injection, Inject 1 mL (150 mg total) into the shoulder, thigh, or buttocks every 3 (three) months, Disp: 1 mL, Rfl: 5    promethazine (PHENERGAN) 25 mg tablet, Take 25 mg by mouth every 6 (six) hours, Disp: , Rfl:     nitrofurantoin (MACROBID) 100 mg capsule, Take 1 capsule (100 mg total) by mouth 2 (two) times a day for 7 days, Disp: 14 capsule, Rfl: 0    phenazopyridine (PYRIDIUM) 100 mg tablet, Take 1 tablet (100 mg total) by mouth 3 (three) times a day as needed for bladder spasms, Disp: 10 tablet, Rfl: 0    Current Allergies     Allergies as of 02/04/2019 - Reviewed 02/04/2019   Allergen Reaction Noted    Sulfa antibiotics Anaphylaxis and Rash 09/28/2017    Ciprofloxacin  01/07/2019    Codeine Other (See Comments) 08/28/2001    Morphine Other (See Comments) 08/28/2001    Tramadol  09/28/2017            The following portions of the patient's history were reviewed and updated as appropriate: allergies, current medications, past family history, past medical history, past social history, past surgical history and problem list      Past Medical History:   Diagnosis Date    Arthritis     Endometriosis     no current issues    Infectious viral hepatitis     history of hep c    Irritable bowel syndrome        Past Surgical History:   Procedure Laterality Date    APPENDECTOMY      CHOLECYSTECTOMY      HEMORRHOID SURGERY         Family History   Problem Relation Age of Onset    Cancer Father          Medications have been verified  Objective   /70   Pulse 93   Temp 97 8 °F (36 6 °C)   Resp 20   Ht 5' 5" (1 651 m)   Wt 80 5 kg (177 lb 6 4 oz)   SpO2 98%   BMI 29 52 kg/m²        Physical Exam     Physical Exam   Constitutional: She is oriented to person, place, and time  She appears well-developed and well-nourished  Cardiovascular: Normal rate, regular rhythm and normal heart sounds  Pulmonary/Chest: Effort normal and breath sounds normal    Abdominal: Soft  Bowel sounds are normal  There is no tenderness (no cva tenderness)  Neurological: She is alert and oriented to person, place, and time  Skin: Skin is warm and dry  Psychiatric: She has a normal mood and affect   Her behavior is normal  Judgment and thought content normal    Nursing note and vitals reviewed

## 2019-02-04 NOTE — PATIENT INSTRUCTIONS
Medication as prescribed / discussed  Increase fluids  Continue to monitor  Urinary Tract Infection in Women   AMBULATORY CARE:   A urinary tract infection (UTI)  is caused by bacteria that get inside your urinary tract  Most bacteria that enter your urinary tract come out when you urinate  If the bacteria stay in your urinary tract, you may get an infection  Your urinary tract includes your kidneys, ureters, bladder, and urethra  Urine is made in your kidneys, and it flows from the ureters to the bladder  Urine leaves the bladder through the urethra  A UTI is more common in your lower urinary tract, which includes your bladder and urethra  Common symptoms include the following:   · Urinating more often or waking from sleep to urinate    · Pain or burning when you urinate    · Pain or pressure in your lower abdomen     · Urine that smells bad    · Blood in your urine    · Leaking urine  Seek care immediately if:   · You are urinating very little or not at all  · You have a high fever with shaking chills  · You have side or back pain that gets worse  Contact your healthcare provider if:   · You have a fever  · You do not feel better after 2 days of taking antibiotics  · You are vomiting  · You have questions or concerns about your condition or care  Treatment for a UTI  may include medicines to treat a bacterial infection  You may also need medicines to decrease pain and burning, or decrease the urge to urinate often  Prevent a UTI:   · Empty your bladder often  Urinate and empty your bladder as soon as you feel the need  Do not hold your urine for long periods of time  · Wipe from front to back after you urinate or have a bowel movement  This will help prevent germs from getting into your urinary tract through your urethra  · Drink liquids as directed  Ask how much liquid to drink each day and which liquids are best for you   You may need to drink more liquids than usual to help flush out the bacteria  Do not drink alcohol, caffeine, or citrus juices  These can irritate your bladder and increase your symptoms  Your healthcare provider may recommend cranberry juice to help prevent a UTI  · Urinate after you have sex  This can help flush out bacteria passed during sex  · Do not douche or use feminine deodorants  These can change the chemical balance in your vagina  · Change sanitary pads or tampons often  This will help prevent germs from getting into your urinary tract  · Do pelvic muscle exercises often  Pelvic muscle exercises may help you start and stop urinating  Strong pelvic muscles may help you empty your bladder easier  Squeeze these muscles tightly for 5 seconds like you are trying to hold back urine  Then relax for 5 seconds  Gradually work up to squeezing for 10 seconds  Do 3 sets of 15 repetitions a day, or as directed  Follow up with your healthcare provider as directed:  Write down your questions so you remember to ask them during your visits  © 2017 [employer] Information is for End User's use only and may not be sold, redistributed or otherwise used for commercial purposes  All illustrations and images included in CareNotes® are the copyrighted property of [employer]  or [employer] Canela  The above information is an  only  It is not intended as medical advice for individual conditions or treatments  Talk to your doctor, nurse or pharmacist before following any medical regimen to see if it is safe and effective for you

## 2019-02-05 LAB — BACTERIA UR CULT: NORMAL

## 2019-02-19 ENCOUNTER — CLINICAL SUPPORT (OUTPATIENT)
Dept: OBGYN CLINIC | Facility: CLINIC | Age: 44
End: 2019-02-19

## 2019-02-19 DIAGNOSIS — Z30.42 ENCOUNTER FOR SURVEILLANCE OF INJECTABLE CONTRACEPTIVE: Primary | ICD-10-CM

## 2019-02-19 PROCEDURE — 96372 THER/PROPH/DIAG INJ SC/IM: CPT

## 2019-02-19 RX ORDER — MEDROXYPROGESTERONE ACETATE 150 MG/ML
150 INJECTION, SUSPENSION INTRAMUSCULAR ONCE
Status: COMPLETED | OUTPATIENT
Start: 2019-02-19 | End: 2019-02-19

## 2019-02-19 RX ADMIN — MEDROXYPROGESTERONE ACETATE 150 MG: 150 INJECTION, SUSPENSION INTRAMUSCULAR at 14:14

## 2019-02-19 NOTE — PROGRESS NOTES
Depo-Provera      [x]   Patient provided box yes    syringe    Last Depo date: 11/20/2018   Side effects: none   HCG; if applicable: N/A   Given by: MIRELA Holder   Site: left buttock   Next appt  Due:5/7/19   Calcium supplement daily teaching, condoms for 2 weeks following first injection dose

## 2019-05-07 ENCOUNTER — CLINICAL SUPPORT (OUTPATIENT)
Dept: OBGYN CLINIC | Facility: CLINIC | Age: 44
End: 2019-05-07

## 2019-05-07 DIAGNOSIS — Z30.42 ENCOUNTER FOR SURVEILLANCE OF INJECTABLE CONTRACEPTIVE: Primary | ICD-10-CM

## 2019-05-07 PROCEDURE — 96372 THER/PROPH/DIAG INJ SC/IM: CPT

## 2019-05-07 RX ORDER — MEDROXYPROGESTERONE ACETATE 150 MG/ML
150 INJECTION, SUSPENSION INTRAMUSCULAR ONCE
Status: COMPLETED | OUTPATIENT
Start: 2019-05-07 | End: 2019-05-07

## 2019-05-07 RX ADMIN — MEDROXYPROGESTERONE ACETATE 150 MG: 150 INJECTION, SUSPENSION INTRAMUSCULAR at 14:16

## 2019-05-14 ENCOUNTER — OFFICE VISIT (OUTPATIENT)
Dept: URGENT CARE | Age: 44
End: 2019-05-14
Payer: COMMERCIAL

## 2019-05-14 VITALS
SYSTOLIC BLOOD PRESSURE: 141 MMHG | RESPIRATION RATE: 16 BRPM | WEIGHT: 179 LBS | TEMPERATURE: 97.9 F | BODY MASS INDEX: 29.82 KG/M2 | HEIGHT: 65 IN | DIASTOLIC BLOOD PRESSURE: 68 MMHG | OXYGEN SATURATION: 98 % | HEART RATE: 95 BPM

## 2019-05-14 DIAGNOSIS — S39.012A STRAIN OF LUMBAR PARASPINOUS MUSCLE, INITIAL ENCOUNTER: Primary | ICD-10-CM

## 2019-05-14 PROCEDURE — 99213 OFFICE O/P EST LOW 20 MIN: CPT | Performed by: PHYSICIAN ASSISTANT

## 2019-05-14 RX ORDER — PREDNISONE 20 MG/1
40 TABLET ORAL DAILY
Qty: 10 TABLET | Refills: 0 | Status: SHIPPED | OUTPATIENT
Start: 2019-05-14 | End: 2019-05-19

## 2019-05-14 RX ORDER — CYCLOBENZAPRINE HCL 10 MG
10 TABLET ORAL 3 TIMES DAILY PRN
Qty: 21 TABLET | Refills: 0 | Status: SHIPPED | OUTPATIENT
Start: 2019-05-14 | End: 2019-08-27

## 2019-07-23 ENCOUNTER — CLINICAL SUPPORT (OUTPATIENT)
Dept: OBGYN CLINIC | Facility: CLINIC | Age: 44
End: 2019-07-23

## 2019-07-23 DIAGNOSIS — Z30.42 ENCOUNTER FOR SURVEILLANCE OF INJECTABLE CONTRACEPTIVE: ICD-10-CM

## 2019-07-23 DIAGNOSIS — Z30.42 ENCOUNTER FOR SURVEILLANCE OF INJECTABLE CONTRACEPTIVE: Primary | ICD-10-CM

## 2019-07-23 PROCEDURE — 96372 THER/PROPH/DIAG INJ SC/IM: CPT

## 2019-07-23 RX ORDER — MEDROXYPROGESTERONE ACETATE 150 MG/ML
150 INJECTION, SUSPENSION INTRAMUSCULAR
Qty: 1 ML | Refills: 3 | Status: SHIPPED | OUTPATIENT
Start: 2019-07-23 | End: 2020-06-15

## 2019-07-23 RX ORDER — MEDROXYPROGESTERONE ACETATE 150 MG/ML
150 INJECTION, SUSPENSION INTRAMUSCULAR ONCE
Status: COMPLETED | OUTPATIENT
Start: 2019-07-23 | End: 2019-07-23

## 2019-07-23 RX ADMIN — MEDROXYPROGESTERONE ACETATE 150 MG: 150 INJECTION, SUSPENSION INTRAMUSCULAR at 11:40

## 2019-07-23 NOTE — PROGRESS NOTES
Depo-Provera      Patient provided box     Syringe    Last Depo date: 5/7/19   Side effects: none   Given by: Malgorzata Arreguin MA   Site: Left buttock   Annual apt: 8/6/19   Depo apt: 10/15/19     Calcium supplement daily teaching, condoms for 2 weeks following first injection dose

## 2019-08-27 ENCOUNTER — OFFICE VISIT (OUTPATIENT)
Dept: URGENT CARE | Age: 44
End: 2019-08-27
Payer: COMMERCIAL

## 2019-08-27 VITALS
TEMPERATURE: 98 F | BODY MASS INDEX: 28.66 KG/M2 | HEART RATE: 90 BPM | OXYGEN SATURATION: 97 % | DIASTOLIC BLOOD PRESSURE: 66 MMHG | RESPIRATION RATE: 18 BRPM | HEIGHT: 65 IN | WEIGHT: 172 LBS | SYSTOLIC BLOOD PRESSURE: 128 MMHG

## 2019-08-27 DIAGNOSIS — B37.2 CANDIDAL INTERTRIGO: Primary | ICD-10-CM

## 2019-08-27 PROCEDURE — 99213 OFFICE O/P EST LOW 20 MIN: CPT | Performed by: PHYSICIAN ASSISTANT

## 2019-08-27 RX ORDER — MEDROXYPROGESTERONE ACETATE 150 MG/ML
INJECTION, SUSPENSION INTRAMUSCULAR
COMMUNITY
Start: 2019-07-23 | End: 2019-08-27

## 2019-08-27 RX ORDER — NYSTATIN 100000 [USP'U]/G
POWDER TOPICAL 3 TIMES DAILY PRN
Qty: 15 G | Refills: 0 | Status: SHIPPED | OUTPATIENT
Start: 2019-08-27 | End: 2019-09-15 | Stop reason: SDUPTHER

## 2019-08-27 NOTE — PROGRESS NOTES
[facility]        NAME: Brandon Mcpherson is a 38 y o  female  : 1975    MRN: [id number]  DATE: 2019  TIME: 5:16 PM    Assessment and Plan   Candidal intertrigo [B37 2]  1  Candidal intertrigo  nystatin (MYCOSTATIN) powder         Patient Instructions       Follow up with PCP in 3-5 days  Proceed to  ER if symptoms worsen  Chief Complaint     Chief Complaint   Patient presents with    Rash     Pt c/o red painful rash in her lower pelvic region (under skin fold) as of today  Denies fever  History of Present Illness       Rash   This is a new problem  The current episode started yesterday  The problem has been gradually worsening since onset  Location: Skin fold under belly  Rash characteristics: red, raised, moist, itchy  She was exposed to nothing  Pertinent negatives include no congestion, cough, diarrhea, fatigue, fever, rhinorrhea, shortness of breath, sore throat or vomiting  Past treatments include nothing  The treatment provided no relief  Patient has a history of recurrent intertrigo under her breasts and her belly  Patient states that she normally uses gold Bond body powder for this, however, she ran out about a week ago  Review of Systems   Review of Systems   Constitutional: Negative for chills, diaphoresis, fatigue and fever  HENT: Negative for congestion, ear pain, postnasal drip, rhinorrhea, sinus pressure, sinus pain, sneezing, sore throat and voice change  Eyes: Negative  Respiratory: Negative for cough, chest tightness, shortness of breath and wheezing  Cardiovascular: Negative for chest pain and palpitations  Gastrointestinal: Negative for abdominal pain, constipation, diarrhea, nausea and vomiting  Endocrine: Negative  Genitourinary: Negative for dysuria  Musculoskeletal: Negative for back pain, myalgias and neck pain  Skin: Positive for rash  Negative for pallor  Allergic/Immunologic: Negative      Neurological: Negative for dizziness, syncope and headaches  Hematological: Negative  Psychiatric/Behavioral: Negative  Current Medications       Current Outpatient Medications:     ALPRAZolam (XANAX) 2 MG tablet, 2 mg 3 (three) times a day  , Disp: , Rfl:     Buprenorphine HCl-Naloxone HCl (ZUBSOLV) 8 6-2 1 MG SUBL, , Disp: , Rfl:     medroxyPROGESTERone (DEPO-PROVERA) 150 mg/mL injection, Inject 1 mL (150 mg total) into a muscle every 3 (three) months, Disp: 1 mL, Rfl: 3    promethazine (PHENERGAN) 25 mg tablet, Take 25 mg by mouth every 6 (six) hours, Disp: , Rfl:     nystatin (MYCOSTATIN) powder, Apply topically 3 (three) times a day as needed (rash), Disp: 15 g, Rfl: 0    Current Allergies     Allergies as of 08/27/2019 - Reviewed 08/27/2019   Allergen Reaction Noted    Sulfa antibiotics Anaphylaxis and Rash 09/28/2017    Ciprofloxacin  01/07/2019    Codeine Other (See Comments) 08/28/2001    Morphine Other (See Comments) 08/28/2001    Tramadol  09/28/2017            The following portions of the patient's history were reviewed and updated as appropriate: allergies, current medications, past family history, past medical history, past social history, past surgical history and problem list      Past Medical History:   Diagnosis Date    Arthritis     Endometriosis     no current issues    Infectious viral hepatitis     history of hep c    Irritable bowel syndrome        Past Surgical History:   Procedure Laterality Date    APPENDECTOMY      CHOLECYSTECTOMY      HEMORRHOID SURGERY         Family History   Problem Relation Age of Onset    Cancer Father          Medications have been verified  Objective   /66   Pulse 90   Temp 98 °F (36 7 °C)   Resp 18   Ht 5' 5" (1 651 m)   Wt 78 kg (172 lb)   SpO2 97%   BMI 28 62 kg/m²        Physical Exam     Physical Exam   Constitutional: She is oriented to person, place, and time  She appears well-developed and well-nourished  No distress     HENT: Head: Normocephalic and atraumatic  Nose: Nose normal    Mouth/Throat: Oropharynx is clear and moist    Cardiovascular: Normal rate, regular rhythm, normal heart sounds and intact distal pulses  Pulmonary/Chest: Effort normal and breath sounds normal  No respiratory distress  She has no wheezes  She has no rales  Musculoskeletal: She exhibits no edema or deformity  Neurological: She is alert and oriented to person, place, and time  Skin: Skin is warm  Capillary refill takes less than 2 seconds  Rash (red, raised, moist rash with satellite lesions to skin fold of belly) noted  She is not diaphoretic  Nursing note and vitals reviewed

## 2019-08-27 NOTE — PATIENT INSTRUCTIONS
Continue to monitor symptoms  If new or worsening symptoms develop, go immediately to Er  Drink plenty of fluids  Follow up with Family Doctor this week  Skin Yeast Infection   WHAT YOU NEED TO KNOW:   Yeast is normally present on the skin  Infection happens when you have too much yeast, or when it gets into a cut on your skin  Certain types of mold and fungus can cause a yeast infection  A skin yeast infection can appear anywhere on your skin or nail beds  Skin yeast infections are usually found on warm, moist parts of the body  Examples include between skin folds or under the breasts  DISCHARGE INSTRUCTIONS:   Return to the emergency department if:   · You have signs of infection, such as pus, warmth or red streaks coming from the wound, or a fever  Contact your healthcare provider if:   · Your symptoms worsen or do not get better within 7 to 10 days  · You have new or returning signs of a skin yeast infection after treatment  · You have questions or concerns about your condition or care  Medicines:   · Antifungal medicine  may be given as a cream, ointment, or pill  · Take your medicine as directed  Contact your healthcare provider if you think your medicine is not helping or if you have side effects  Tell him or her if you are allergic to any medicine  Keep a list of the medicines, vitamins, and herbs you take  Include the amounts, and when and why you take them  Bring the list or the pill bottles to follow-up visits  Carry your medicine list with you in case of an emergency  Care for the skin near the infection:  You may only have discolored patches of skin, or areas that are dry and flaking  Care for these skin problems as directed by your healthcare provider  If you have painful skin or an open sore, you will need to protect the skin and prevent damage  You will also need to keep the skin dry as much as possible   Ask your healthcare provider how to care for your skin while the infection gretchen  The following are general guidelines for caring for painful or open skin:  · Keep the skin clean  Ask your healthcare provider if you should wash with mild soap and water  Do not use soap that contains alcohol  Alcohol can dry and irritate the skin and make symptoms worse  Your baby's healthcare provider may tell you to use diaper cream or ointment when you change his diaper  This will protect the skin and prevent moisture from collecting  · Keep the skin dry  Pat the area dry with a towel  Do not rub, because this may irritate the skin  If you have a skin yeast infection between skin folds, lift the top part gently and hold it while you dry between your skin folds  Always dry your feet completely after you swim or bathe, including between your toes  Dry your skin if you are sweating from exercise or exposure to heat  Use a clean towel each time to prevent spreading or continuing the infection  · Keep the skin protected  Ask your healthcare provider if you should cover the area with a bandage or leave it open  Check your skin each day to make sure you do not have new or worsening problems  You may need to have someone check the skin if you cannot see the area easily    Prevent another skin yeast infection:   · Do not share clothing or towels    · Wear shower shoes if you need to use a public shower    · Dry your feet completely after you bathe, and apply antifungal powder or cream as directed    · Put on socks before you get dressed so you do not spread fungus from your feet    · Wear light clothing that allows air to get to your skin    · Manage your weight to prevent skin folds where yeast can collect    · Manage diabetes    · Change your baby's diaper often, and keep the area clean and dry as much as possible    · Use a diaper cream or ointment that contains zinc oxide or dimethicone on your baby's diaper area as directed  Follow up with your healthcare provider as directed:  Write down your questions so you remember to ask them during your visits  © 2017 [employer] Information is for End User's use only and may not be sold, redistributed or otherwise used for commercial purposes  All illustrations and images included in CareNotes® are the copyrighted property of [employer]  or Ted Canela  The above information is an  only  It is not intended as medical advice for individual conditions or treatments  Talk to your doctor, nurse or pharmacist before following any medical regimen to see if it is safe and effective for you

## 2019-09-15 ENCOUNTER — OFFICE VISIT (OUTPATIENT)
Dept: URGENT CARE | Age: 44
End: 2019-09-15
Payer: COMMERCIAL

## 2019-09-15 VITALS
DIASTOLIC BLOOD PRESSURE: 68 MMHG | OXYGEN SATURATION: 98 % | HEART RATE: 80 BPM | TEMPERATURE: 97.4 F | RESPIRATION RATE: 16 BRPM | SYSTOLIC BLOOD PRESSURE: 132 MMHG

## 2019-09-15 DIAGNOSIS — B37.2 CANDIDAL INTERTRIGO: ICD-10-CM

## 2019-09-15 PROCEDURE — 99213 OFFICE O/P EST LOW 20 MIN: CPT | Performed by: PHYSICIAN ASSISTANT

## 2019-09-15 RX ORDER — NYSTATIN 100000 [USP'U]/G
POWDER TOPICAL 3 TIMES DAILY PRN
Qty: 15 G | Refills: 0 | Status: SHIPPED | OUTPATIENT
Start: 2019-09-15 | End: 2019-09-19

## 2019-09-15 NOTE — PATIENT INSTRUCTIONS
Follow up with PCP in 3-5 days  Proceed to  ER if symptoms worsen  Patient will continue with Nystatin   Symptoms represent flare of previous rash more than likely  Antihistamines as needed for itching  Family practice referral ordered     Skin Yeast Infection   WHAT YOU NEED TO KNOW:   Yeast is normally present on the skin  Infection happens when you have too much yeast, or when it gets into a cut on your skin  Certain types of mold and fungus can cause a yeast infection  A skin yeast infection can appear anywhere on your skin or nail beds  Skin yeast infections are usually found on warm, moist parts of the body  Examples include between skin folds or under the breasts  DISCHARGE INSTRUCTIONS:   Return to the emergency department if:   · You have signs of infection, such as pus, warmth or red streaks coming from the wound, or a fever  Contact your healthcare provider if:   · Your symptoms worsen or do not get better within 7 to 10 days  · You have new or returning signs of a skin yeast infection after treatment  · You have questions or concerns about your condition or care  Medicines:   · Antifungal medicine  may be given as a cream, ointment, or pill  · Take your medicine as directed  Contact your healthcare provider if you think your medicine is not helping or if you have side effects  Tell him or her if you are allergic to any medicine  Keep a list of the medicines, vitamins, and herbs you take  Include the amounts, and when and why you take them  Bring the list or the pill bottles to follow-up visits  Carry your medicine list with you in case of an emergency  Care for the skin near the infection:  You may only have discolored patches of skin, or areas that are dry and flaking  Care for these skin problems as directed by your healthcare provider  If you have painful skin or an open sore, you will need to protect the skin and prevent damage   You will also need to keep the skin dry as much as possible  Ask your healthcare provider how to care for your skin while the infection clears  The following are general guidelines for caring for painful or open skin:  · Keep the skin clean  Ask your healthcare provider if you should wash with mild soap and water  Do not use soap that contains alcohol  Alcohol can dry and irritate the skin and make symptoms worse  Your baby's healthcare provider may tell you to use diaper cream or ointment when you change his diaper  This will protect the skin and prevent moisture from collecting  · Keep the skin dry  Pat the area dry with a towel  Do not rub, because this may irritate the skin  If you have a skin yeast infection between skin folds, lift the top part gently and hold it while you dry between your skin folds  Always dry your feet completely after you swim or bathe, including between your toes  Dry your skin if you are sweating from exercise or exposure to heat  Use a clean towel each time to prevent spreading or continuing the infection  · Keep the skin protected  Ask your healthcare provider if you should cover the area with a bandage or leave it open  Check your skin each day to make sure you do not have new or worsening problems  You may need to have someone check the skin if you cannot see the area easily    Prevent another skin yeast infection:   · Do not share clothing or towels    · Wear shower shoes if you need to use a public shower    · Dry your feet completely after you bathe, and apply antifungal powder or cream as directed    · Put on socks before you get dressed so you do not spread fungus from your feet    · Wear light clothing that allows air to get to your skin    · Manage your weight to prevent skin folds where yeast can collect    · Manage diabetes    · Change your baby's diaper often, and keep the area clean and dry as much as possible    · Use a diaper cream or ointment that contains zinc oxide or dimethicone on your baby's diaper area as directed  Follow up with your healthcare provider as directed:  Write down your questions so you remember to ask them during your visits  © 2017 [employer] Information is for End User's use only and may not be sold, redistributed or otherwise used for commercial purposes  All illustrations and images included in CareNotes® are the copyrighted property of [employer]  or [employer] Canela  The above information is an  only  It is not intended as medical advice for individual conditions or treatments  Talk to your doctor, nurse or pharmacist before following any medical regimen to see if it is safe and effective for you

## 2019-09-15 NOTE — PROGRESS NOTES
[facility]        NAME: Yasir Rendon is a 38 y o  female  : 1975    MRN: [id number]  DATE: September 15, 2019  TIME: 4:20 PM    Assessment and Plan   No primary diagnosis found  1  Candidal intertrigo  nystatin (MYCOSTATIN) powder    Ambulatory referral to [facility]         Patient Instructions       Follow up with PCP in 3-5 days  Proceed to  ER if symptoms worsen  Chief Complaint     Chief Complaint   Patient presents with    Rash     under pannus;  used nystatin powder recently;  prescribed ;  rash returned         History of Present Illness       Patient has had rash on 19  Patient was treated with nystatin  Patient states that the rash was getting better but last night had a flare of the same rash  Patient describes the rash under the belly as burning / uncomfortable  States that the rash is similar to the one prior  Patient denies any prior history of rashes  Rash   This is a recurrent problem  The current episode started 1 to 4 weeks ago  The problem has been gradually worsening since onset  The affected locations include the torso  The rash is characterized by burning, itchiness, redness and peeling  She was exposed to nothing  Pertinent negatives include no congestion, cough, diarrhea, eye pain, facial edema, fatigue, fever, joint pain, nail changes, rhinorrhea, shortness of breath, sore throat or vomiting  Treatments tried: nystatin  The treatment provided moderate relief  There is no history of allergies, asthma or varicella  Review of Systems   Review of Systems   Constitutional: Negative for chills, fatigue and fever  HENT: Negative  Negative for congestion, rhinorrhea and sore throat  Eyes: Negative  Negative for pain  Respiratory: Negative  Negative for cough and shortness of breath  Cardiovascular: Negative  Gastrointestinal: Negative  Negative for diarrhea and vomiting  Endocrine: Negative  Genitourinary: Negative  Negative for difficulty urinating  Musculoskeletal: Negative for joint pain  Skin: Positive for color change and rash  Negative for nail changes, pallor and wound  Allergic/Immunologic: Negative  Neurological: Negative  Hematological: Negative  Psychiatric/Behavioral: Negative  Current Medications       Current Outpatient Medications:     ALPRAZolam (XANAX) 2 MG tablet, 2 mg 3 (three) times a day  , Disp: , Rfl:     Buprenorphine HCl-Naloxone HCl (ZUBSOLV) 8 6-2 1 MG SUBL, , Disp: , Rfl:     medroxyPROGESTERone (DEPO-PROVERA) 150 mg/mL injection, Inject 1 mL (150 mg total) into a muscle every 3 (three) months, Disp: 1 mL, Rfl: 3    nystatin (MYCOSTATIN) powder, Apply topically 3 (three) times a day as needed (rash), Disp: 15 g, Rfl: 0    promethazine (PHENERGAN) 25 mg tablet, Take 25 mg by mouth every 6 (six) hours, Disp: , Rfl:     Current Allergies     Allergies as of 09/15/2019 - Reviewed 09/15/2019   Allergen Reaction Noted    Sulfa antibiotics Anaphylaxis and Rash 09/28/2017    Ciprofloxacin  01/07/2019    Codeine Other (See Comments) 08/28/2001    Morphine Other (See Comments) 08/28/2001    Tramadol  09/28/2017            The following portions of the patient's history were reviewed and updated as appropriate: allergies, current medications, past family history, past medical history, past social history, past surgical history and problem list      Past Medical History:   Diagnosis Date    Arthritis     Endometriosis     no current issues    Infectious viral hepatitis     history of hep c    Irritable bowel syndrome        Past Surgical History:   Procedure Laterality Date    APPENDECTOMY      CHOLECYSTECTOMY      HEMORRHOID SURGERY         Family History   Problem Relation Age of Onset    Cancer Father          Medications have been verified          Objective   /68   Pulse 80   Temp (!) 97 4 °F (36 3 °C)   Resp 16   SpO2 98%        Physical Exam     Physical Exam   Constitutional: She is oriented to person, place, and time  She appears well-developed and well-nourished  HENT:   Head: Normocephalic  Eyes: Pupils are equal, round, and reactive to light  Conjunctivae and EOM are normal    Neck: Normal range of motion  Cardiovascular: Normal rate, regular rhythm, normal heart sounds and intact distal pulses  Pulmonary/Chest: Effort normal and breath sounds normal    Neurological: She is alert and oriented to person, place, and time  Skin: Skin is warm  Capillary refill takes less than 2 seconds  Psychiatric: She has a normal mood and affect  Her behavior is normal  Judgment and thought content normal    Nursing note and vitals reviewed

## 2019-09-19 ENCOUNTER — OFFICE VISIT (OUTPATIENT)
Dept: FAMILY MEDICINE CLINIC | Facility: CLINIC | Age: 44
End: 2019-09-19
Payer: COMMERCIAL

## 2019-09-19 VITALS
HEART RATE: 76 BPM | WEIGHT: 173.4 LBS | DIASTOLIC BLOOD PRESSURE: 84 MMHG | HEIGHT: 65 IN | BODY MASS INDEX: 28.89 KG/M2 | SYSTOLIC BLOOD PRESSURE: 122 MMHG | OXYGEN SATURATION: 98 % | RESPIRATION RATE: 16 BRPM | TEMPERATURE: 98.2 F

## 2019-09-19 DIAGNOSIS — Z23 FLU VACCINE NEED: ICD-10-CM

## 2019-09-19 DIAGNOSIS — F41.9 ANXIETY: ICD-10-CM

## 2019-09-19 DIAGNOSIS — E01.0 THYROMEGALY: ICD-10-CM

## 2019-09-19 DIAGNOSIS — F41.0 PANIC ATTACKS: ICD-10-CM

## 2019-09-19 DIAGNOSIS — Z11.59 ENCOUNTER FOR HEPATITIS C SCREENING TEST FOR LOW RISK PATIENT: ICD-10-CM

## 2019-09-19 DIAGNOSIS — B37.2 CANDIDAL INTERTRIGO: ICD-10-CM

## 2019-09-19 DIAGNOSIS — K21.9 GASTROESOPHAGEAL REFLUX DISEASE WITHOUT ESOPHAGITIS: Primary | ICD-10-CM

## 2019-09-19 DIAGNOSIS — Z12.39 BREAST SCREENING, UNSPECIFIED: ICD-10-CM

## 2019-09-19 PROBLEM — B37.31 CANDIDAL VULVOVAGINITIS: Status: ACTIVE | Noted: 2018-01-02

## 2019-09-19 PROBLEM — B37.3 CANDIDAL VULVOVAGINITIS: Status: ACTIVE | Noted: 2018-01-02

## 2019-09-19 PROCEDURE — 99386 PREV VISIT NEW AGE 40-64: CPT | Performed by: INTERNAL MEDICINE

## 2019-09-19 RX ORDER — CLOTRIMAZOLE AND BETAMETHASONE DIPROPIONATE 10; .64 MG/G; MG/G
CREAM TOPICAL 2 TIMES DAILY
Qty: 30 G | Refills: 2 | Status: SHIPPED | OUTPATIENT
Start: 2019-09-19 | End: 2021-08-23

## 2019-09-19 RX ORDER — FLUCONAZOLE 150 MG/1
150 TABLET ORAL DAILY
Qty: 3 TABLET | Refills: 0 | Status: SHIPPED | OUTPATIENT
Start: 2019-09-19 | End: 2019-09-22

## 2019-09-19 RX ORDER — PROMETHAZINE HYDROCHLORIDE 25 MG/1
25 TABLET ORAL
Qty: 30 TABLET | Refills: 3 | Status: SHIPPED | OUTPATIENT
Start: 2019-09-19 | End: 2021-08-31 | Stop reason: SDUPTHER

## 2019-09-19 NOTE — PROGRESS NOTES
Assessment/Plan:         Diagnoses and all orders for this visit:    Gastroesophageal reflux disease without esophagitis; life style mod  RTC in 1 mo w :  -     Comprehensive metabolic panel; Future  -     CBC and differential; Future  -     Hemoglobin A1C; Future  -     Magnesium; Future  -     Lipid panel; Future  -     Thyroid Antibodies Panel; Future  -     T4, free; Future  -     TSH, 3rd generation; Future  -     US thyroid; Future  -     Vitamin D 25 hydroxy; Future  -     Vitamin B12; Future  -     Urinalysis with reflex to microscopic  -     FL UPPER GI UGI; Future    Encounter for hepatitis C screening test for low risk patient  -     Hepatitis C antibody; Future    Breast screening, unspecified  -     Mammo screening bilateral w cad; Future    Candidal intertrigo:lower abd wall :  -     TRY :  -     fluconazole (DIFLUCAN) 150 mg tablet; Take 1 tablet (150 mg total) by mouth daily for 3 doses With Food/Lunch  -     clotrimazole-betamethasone (LOTRISONE) 1-0 05 % cream; Apply topically 2 (two) times a day Apply abdominal wall  RTC in 1mo  Flu vaccine need  -     Cancel: FLUZONE: influenza vaccine, quadrivalent, 0 5 mL    Anxiety; FU w Psychiatry, continue :  -     promethazine (PHENERGAN) 25 mg tablet; Take 1 tablet (25 mg total) by mouth daily at bedtime    Panic attacks; as above,     -     promethazine (PHENERGAN) 25 mg tablet; Take 1 tablet (25 mg total) by mouth daily at bedtime  -     Comprehensive metabolic panel; Future  -     CBC and differential; Future  -     Hemoglobin A1C; Future  -     Magnesium; Future  -     Lipid panel; Future  -     Thyroid Antibodies Panel; Future  -     T4, free; Future  -     TSH, 3rd generation; Future  -     US thyroid; Future  -     Vitamin D 25 hydroxy; Future  -     Vitamin B12; Future  -     Urinalysis with reflex to microscopic    Thyromegaly  -     Thyroid Antibodies Panel; Future  -     T4, free; Future  -     TSH, 3rd generation;  Future  -     US thyroid; Future        Subjective:      Patient ID: Yusuf Pires is a 38 y o  female  [address] is here for first time in my office , No recent blood work, med list reviewed w pt in detail, she has few issues as per R O S ,  The following portions of the patient's history were reviewed and updated as appropriate: allergies, current medications, past family history, past social history, past surgical history and problem list     Review of Systems   Constitutional: Negative for chills, fatigue and fever  HENT: Negative for congestion, facial swelling, sore throat, trouble swallowing and voice change  Eyes: Negative for pain, discharge and visual disturbance  Respiratory: Negative for cough, shortness of breath and wheezing  Cardiovascular: Negative for chest pain, palpitations and leg swelling  Gastrointestinal: Negative for abdominal pain, blood in stool, constipation, diarrhea and nausea  Endocrine: Negative for polydipsia, polyphagia and polyuria  Genitourinary: Negative for difficulty urinating, hematuria and urgency  Musculoskeletal: Negative for arthralgias and myalgias  Skin: Negative for rash  Neurological: Negative for dizziness, tremors, weakness and headaches  Hematological: Negative for adenopathy  Does not bruise/bleed easily  Psychiatric/Behavioral: Negative for dysphoric mood, sleep disturbance and suicidal ideas  Objective:      /84 (BP Location: Left arm, Patient Position: Sitting, Cuff Size: Standard)   Pulse 76   Temp 98 2 °F (36 8 °C) (Oral)   Resp 16   Ht 5' 4 5" (1 638 m)   Wt 78 7 kg (173 lb 6 4 oz)   SpO2 98%   BMI 29 30 kg/m²          Physical Exam   Constitutional: She is oriented to person, place, and time  She appears well-nourished  No distress  HENT:   Head: Normocephalic  Mouth/Throat: Oropharynx is clear and moist  No oropharyngeal exudate  Eyes: Pupils are equal, round, and reactive to light   Conjunctivae are normal  No scleral icterus  Neck: Neck supple  No thyromegaly present  Cardiovascular: Normal rate, regular rhythm and normal heart sounds  No murmur heard  Pulmonary/Chest: Effort normal and breath sounds normal  No respiratory distress  She has no wheezes  She has no rales  Abdominal: Soft  Bowel sounds are normal  She exhibits no distension  There is no tenderness  There is no rebound and no guarding  Musculoskeletal: She exhibits no edema or tenderness  Lymphadenopathy:     She has no cervical adenopathy  Neurological: She is alert and oriented to person, place, and time  No cranial nerve deficit  Skin: No erythema  Psychiatric: She has a normal mood and affect  BMI Counseling: Body mass index is 29 3 kg/m²  The BMI is above normal  Nutrition recommendations include reducing portion sizes and decreasing overall calorie intake

## 2019-09-19 NOTE — PATIENT INSTRUCTIONS
Low Fat Diet   AMBULATORY CARE:   A low-fat diet  is an eating plan that is low in total fat, unhealthy fat, and cholesterol  You may need to follow a low-fat diet if you have trouble digesting or absorbing fat  You may also need to follow this diet if you have high cholesterol  You can also lower your cholesterol by increasing the amount of fiber in your diet  Soluble fiber is a type of fiber that helps to decrease cholesterol levels  Different types of fat in food:   · Limit unhealthy fats  A diet that is high in cholesterol, saturated fat, and trans fat may cause unhealthy cholesterol levels  Unhealthy cholesterol levels increase your risk of heart disease  ¨ Cholesterol:  Limit intake of cholesterol to less than 200 mg per day  Cholesterol is found in meat, eggs, and dairy  ¨ Saturated fat:  Limit saturated fat to less than 7% of your total daily calories  Ask your dietitian how many calories you need each day  Saturated fat is found in butter, cheese, ice cream, whole milk, and palm oil  Saturated fat is also found in meat, such as beef, pork, chicken skin, and processed meats  Processed meats include sausage, hot dogs, and bologna  ¨ Trans fat:  Avoid trans fat as much as possible  Trans fat is used in fried and baked foods  Foods that say trans fat free on the label may still have up to 0 5 grams of trans fat per serving  · Include healthy fats  Replace foods that are high in saturated and trans fat with foods high in healthy fats  This may help to decrease high cholesterol levels  ¨ Monounsaturated fats: These are found in avocados, nuts, and vegetable oils, such as olive, canola, and sunflower oil  ¨ Polyunsaturated fats: These can be found in vegetable oils, such as soybean or corn oil  Omega-3 fats can help to decrease the risk of heart disease  Omega-3 fats are found in fish, such as salmon, herring, trout, and tuna   Omega-3 fats can also be found in plant foods, such as walnuts, flaxseed, soybeans, and canola oil    Foods to limit or avoid:   · Grains:      ¨ Snacks that are made with partially hydrogenated oils, such as chips, regular crackers, and butter-flavored popcorn    ¨ High-fat baked goods, such as biscuits, croissants, doughnuts, pies, cookies, and pastries    · Dairy:      ¨ Whole milk, 2% milk, and yogurt and ice cream made with whole milk    ¨ Half and half creamer, heavy cream, and whipping cream    ¨ Cheese, cream cheese, and sour cream    · Meats and proteins:      ¨ High-fat cuts of meat (T-bone steak, regular hamburger, and ribs)    ¨ Fried meat, poultry (turkey and chicken), and fish    ¨ Poultry (chicken and turkey) with skin    ¨ Cold cuts (salami or bologna), hot dogs, summers, and sausage    ¨ Whole eggs and egg yolks    · Vegetables and fruits with added fat:      ¨ Fried vegetables or vegetables in butter or high-fat sauces, such as cream or cheese sauces    ¨ Fried fruit or fruit served with butter or cream    · Fats:      ¨ Butter, stick margarine, and shortening    ¨ Coconut, palm oil, and palm kernel oil  Foods to include:   · Grains:      ¨ Whole-grain breads, cereals, pasta, and brown rice    ¨ Low-fat crackers and pretzels    · Vegetables and fruits:      ¨ Fresh, frozen, or canned vegetables (no salt or low-sodium)    ¨ Fresh, frozen, dried, or canned fruit (canned in light syrup or fruit juice)    ¨ Avocado    · Low-fat dairy products:      ¨ Nonfat (skim) or 1% milk    ¨ Nonfat or low-fat cheese, yogurt, and cottage cheese    · Meats and proteins:      ¨ Chicken or turkey with no skin    ¨ Baked or broiled fish    ¨ Lean beef and pork (loin, round, extra lean hamburger)    ¨ Beans and peas, unsalted nuts, soy products    ¨ Egg whites and substitutes    ¨ Seeds and nuts    · Fats:      ¨ Unsaturated oil, such as canola, olive, peanut, soybean, or sunflower oil    ¨ Soft or liquid margarine and vegetable oil spread    ¨ Low-fat salad dressing  Other ways to decrease fat:   · Read food labels before you buy foods  Choose foods that have less than 30% of calories from fat  Choose low-fat or fat-free dairy products  Remember that fat free does not mean calorie free  These foods still contain calories, and too many calories can lead to weight gain  · Trim fat from meat and avoid fried food  Trim all visible fat from meat before you cook it  Remove the skin from poultry  Do not minaya meat, fish, or poultry  Bake, roast, boil, or broil these foods instead  Avoid fried foods  Eat a baked potato instead of [place] fries  Steam vegetables instead of sautéing them in butter  · Add less fat to foods  Use imitation summers bits on salads and baked potatoes instead of regular summers bits  Use fat-free or low-fat salad dressings instead of regular dressings  Use low-fat or nonfat butter-flavored topping instead of regular butter or margarine on popcorn and other foods  Ways to decrease fat in recipes:  Replace high-fat ingredients with low-fat or nonfat ones  This may cause baked goods to be drier than usual  You may need to use nonfat cooking spray on pans to prevent food from sticking  You also may need to change the amount of other ingredients, such as water, in the recipe  Try the following:  · Use low-fat or light margarine instead of regular margarine or shortening  · Use lean ground turkey breast or chicken, or lean ground beef (less than 5% fat) instead of hamburger  · Add 1 teaspoon of canola oil to 8 ounces of skim milk instead of using cream or half and half  · Use grated zucchini, carrots, or apples in breads instead of coconut  · Use blenderized, low-fat cottage cheese, plain tofu, or low-fat ricotta cheese instead of cream cheese  · Use 1 egg white and 1 teaspoon of canola oil, or use ¼ cup (2 ounces) of fat-free egg substitute instead of a whole egg       · Replace half of the oil that is called for in a recipe with applesauce when you bake  Use 3 tablespoons of cocoa powder and 1 tablespoon of canola oil instead of a square of baking chocolate  How to increase fiber:  Eat enough high-fiber foods to get 20 to 30 grams of fiber every day  Slowly increase your fiber intake to avoid stomach cramps, gas, and other problems  · Eat 3 ounces of whole-grain foods each day  An ounce is about 1 slice of bread  Eat whole-grain breads, such as whole-wheat bread  Whole wheat, whole-wheat flour, or other whole grains should be listed as the first ingredient on the food label  Replace white flour with whole-grain flour or use half of each in recipes  Whole-grain flour is heavier than white flour, so you may have to add more yeast or baking powder  · Eat a high-fiber cereal for breakfast   Oatmeal is a good source of soluble fiber  Look for cereals that have bran or fiber in the name  Choose whole-grain products, such as brown rice, barley, and whole-wheat pasta  · Eat more beans, peas, and lentils  For example, add beans to soups or salads  Eat at least 5 cups of fruits and vegetables each day  Eat fruits and vegetables with the peel because the peel is high in fiber  © 2017 [employer] Information is for End User's use only and may not be sold, redistributed or otherwise used for commercial purposes  All illustrations and images included in CareNotes® are the copyrighted property of [employer]  or Ted Canela  The above information is an  only  It is not intended as medical advice for individual conditions or treatments  Talk to your doctor, nurse or pharmacist before following any medical regimen to see if it is safe and effective for you  Heart Healthy Diet   AMBULATORY CARE:   A heart healthy diet  is an eating plan low in total fat, unhealthy fats, and sodium (salt)  A heart healthy diet helps decrease your risk for heart disease and stroke   Limit the amount of fat you eat to 25% to 35% of your total daily calories  Limit sodium to less than 2,300 mg each day  Healthy fats:  Healthy fats can help improve cholesterol levels  The risk for heart disease is decreased when cholesterol levels are normal  Choose healthy fats, such as the following:  · Unsaturated fat  is found in foods such as soybean, canola, olive, corn, and safflower oils  It is also found in soft tub margarine that is made with liquid vegetable oil  · Omega-3 fat  is found in certain fish, such as salmon, tuna, and trout, and in walnuts and flaxseed  Unhealthy fats:  Unhealthy fats can cause unhealthy cholesterol levels in your blood and increase your risk of heart disease  Limit unhealthy fats, such as the following:  · Cholesterol  is found in animal foods, such as eggs and lobster, and in dairy products made from whole milk  Limit cholesterol to less than 300 milligrams (mg) each day  You may need to limit cholesterol to 200 mg each day if you have heart disease  · Saturated fat  is found in meats, such as summers and hamburger  It is also found in chicken or turkey skin, whole milk, and butter  Limit saturated fat to less than 7% of your total daily calories  Limit saturated fat to less than 6% if you have heart disease or are at increased risk for it  · Trans fat  is found in packaged foods, such as potato chips and cookies  It is also in hard margarine, some fried foods, and shortening  Avoid trans fats as much as possible    Heart healthy foods and drinks to include:  Ask your dietitian or healthcare provider how many servings to have from each of the following food groups:  · Grains:      ¨ Whole-wheat breads, cereals, and pastas, and brown rice    ¨ Low-fat, low-sodium crackers and chips    · Vegetables:      ¨ Broccoli, green beans, green peas, and spinach    ¨ Collards, kale, and lima beans    ¨ Carrots, sweet potatoes, tomatoes, and peppers    ¨ Canned vegetables with no salt added    · Fruits:      ¨ Bananas, peaches, pears, and pineapple    ¨ Grapes, raisins, and dates    ¨ Oranges, tangerines, grapefruit, orange juice, and grapefruit juice    ¨ Apricots, mangoes, melons, and papaya    ¨ Raspberries and strawberries    ¨ Canned fruit with no added sugar    · Low-fat dairy products:      ¨ Nonfat (skim) milk, 1% milk, and low-fat almond, cashew, or soy milks fortified with calcium    ¨ Low-fat cheese, regular or frozen yogurt, and cottage cheese    · Meats and proteins , such as lean cuts of beef and pork (loin, leg, round), skinless chicken and turkey, legumes, soy products, egg whites, and nuts  Foods and drinks to limit or avoid:  Ask your dietitian or healthcare provider about these and other foods that are high in unhealthy fat, sodium, and sugar:  · Snack or packaged foods , such as frozen dinners, cookies, macaroni and cheese, and cereals with more than 300 mg of sodium per serving    · Canned or dry mixes  for cakes, soups, sauces, or gravies    · Vegetables with added sodium , such as instant potatoes, vegetables with added sauces, or regular canned vegetables    · Other foods high in sodium , such as ketchup, barbecue sauce, salad dressing, pickles, olives, soy sauce, and miso    · High-fat dairy foods  such as whole or 2% milk, cream cheese, or sour cream, and cheeses     · High-fat protein foods  such as high-fat cuts of beef (T-bone steaks, ribs), chicken or turkey with skin, and organ meats, such as liver    · Cured or smoked meats , such as hot dogs, summers, and sausage    · Unhealthy fats and oils , such as butter, stick margarine, shortening, and cooking oils such as coconut or palm oil    · Food and drinks high in sugar , such as soft drinks (soda), sports drinks, sweetened tea, candy, cake, cookies, pies, and doughnuts  Other diet guidelines to follow:   · Eat more foods containing omega-3 fats  Eat fish high in omega-3 fats at least 2 times a week  · Limit alcohol    Too much alcohol can damage your heart and raise your blood pressure  Women should limit alcohol to 1 drink a day  Men should limit alcohol to 2 drinks a day  A drink of alcohol is 12 ounces of beer, 5 ounces of wine, or 1½ ounces of liquor  · Choose low-sodium foods  High-sodium foods can lead to high blood pressure  Add little or no salt to food you prepare  Use herbs and spices in place of salt  · Eat more fiber  to help lower cholesterol levels  Eat at least 5 servings of fruits and vegetables each day  Eat 3 ounces of whole-grain foods each day  Legumes (beans) are also a good source of fiber  Lifestyle guidelines:   · Do not smoke  Nicotine and other chemicals in cigarettes and cigars can cause lung and heart damage  Ask your healthcare provider for information if you currently smoke and need help to quit  E-cigarettes or smokeless tobacco still contain nicotine  Talk to your healthcare provider before you use these products  · Exercise regularly  to help you maintain a healthy weight and improve your blood pressure and cholesterol levels  Ask your healthcare provider about the best exercise plan for you  Do not start an exercise program without asking your healthcare provider  Follow up with your healthcare provider as directed:  Write down your questions so you remember to ask them during your visits  © 2017 [employer] [facility] Information is for End User's use only and may not be sold, redistributed or otherwise used for commercial purposes  All illustrations and images included in CareNotes® are the copyrighted property of [employer]  or Ted Canela  The above information is an  only  It is not intended as medical advice for individual conditions or treatments  Talk to your doctor, nurse or pharmacist before following any medical regimen to see if it is safe and effective for you  Calorie Counting Diet   WHAT YOU NEED TO KNOW:   What is a calorie counting diet?   It is a meal plan based on counting calories each day to reach a healthy body weight  You will need to eat fewer calories if you are trying to lose weight  Weight loss may decrease your risk for certain health problems or improve your health if you have health problems  Some of these health problems include heart disease, high blood pressure, and diabetes  What foods should I avoid? Your dietitian will tell you if you need to avoid certain foods based on your body weight and health condition  You may need to avoid high-fat foods if you are at risk for or have heart disease  You may need to eat fewer foods from the breads and starches food group if you have diabetes  How many calories are in foods? The following is a list of foods and drinks with the approximate number of calories in each  Check the food label to find the exact number of calories  A dietitian can tell you how many calories you should have from each food group each day    · Carbohydrate:      ¨ ½ of a 3-inch bagel, 1 slice of bread, or ½ of a hamburger bun or hot dog bun (80)    ¨ 1 (8-inch) flour tortilla or ½ cup of cooked rice (100)    ¨ 1 (6-inch) corn tortilla (80)    ¨ 1 (6-inch) pancake or 1 cup of bran flakes cereal (110)    ¨ ½ cup of cooked cereal (80)    ¨ ½ cup of cooked pasta (85)    ¨ 1 ounce of pretzels (100)    ¨ 3 cups of air-popped popcorn without butter or oil (80)    · Dairy:      ¨ 1 cup of skim or 1% milk (90)    ¨ 1 cup of 2% milk (120)    ¨ 1 cup of whole milk (160)    ¨ 1 cup of 2% chocolate milk (220)    ¨ 1 ounce of low-fat cheese with 3 grams of fat per ounce (70)    ¨ 1 ounce of cheddar cheese (114)    ¨ ½ cup of 1% fat cottage cheese (80)    ¨ 1 cup of plain or sugar-free, fat-free yogurt (90)    · Protein foods:      ¨ 3 ounces of fish (not breaded or fried) (95)    ¨ 3 ounces of breaded, fried fish (195)    ¨ ¾ cup of tuna canned in water (105)    ¨ 3 ounces of chicken breast without skin (105)    ¨ 1 fried chicken breast with skin (350)    ¨ ¼ cup of fat free egg substitute (40)    ¨ 1 large egg (75)    ¨ 3 ounces of lean beef or pork (165)    ¨ 3 ounces of fried pork chop or ham (185)    ¨ ½ cup of cooked dried beans, such as kidney, avilez, lentils, or navy (115)    ¨ 3 ounces of bologna or lunch meat (225)    ¨ 2 links of breakfast sausage (140)    · Vegetables:      ¨ ½ cup of sliced mushrooms (10)    ¨ 1 cup of salad greens, such as lettuce, spinach, or satinder (15)    ¨ ½ cup of steamed asparagus (20)    ¨ ½ cup of cooked summer squash, zucchini squash, or green or wax beans (25)    ¨ 1 cup of broccoli or cauliflower florets, or 1 medium tomato (25)    ¨ 1 large raw carrot or ½ cup of cooked carrots (40)    ¨ ? of a medium cucumber or 1 stalk of celery (5)    ¨ 1 small baked potato (160)    ¨ 1 cup of breaded, fried vegetables (230)    · Fruit:      ¨ 1 (6-inch) banana (55)     ¨ ½ of a 4-inch grapefruit (55)    ¨ 15 grapes (60)    ¨ 1 medium orange or apple (70)    ¨ 1 large peach (65)    ¨ 1 cup of fresh pineapple chunks (75)    ¨ 1 cup of melon cubes (50)    ¨ 1¼ cups of whole strawberries (45)    ¨ ½ cup of fruit canned in juice (55)    ¨ ½ cup of fruit canned in heavy syrup (110)    ¨ ?  cup of raisins (130)    ¨ ½ cup of unsweetened fruit juice (60)    ¨ ½ cup of grape, cranberry, or prune juice (90)    · Fat:      ¨ 10 peanuts or 2 teaspoons of peanut butter (55)    ¨ 2 tablespoons of avocado or 1 tablespoon of regular salad dressing (45)    ¨ 2 slices of summers (90)    ¨ 1 teaspoon of oil, such as safflower, canola, corn, or olive oil (45)    ¨ 2 teaspoons of low-fat margarine, or 1 tablespoon of low-fat mayonnaise (50)    ¨ 1 teaspoon of regular margarine (40)    ¨ 1 tablespoon of regular mayonnaise (135)    ¨ 1 tablespoon of cream cheese or 2 tablespoons of low-fat cream cheese (45)    ¨ 2 tablespoons of vegetable shortening (215)    · Dessert and sweets:      ¨ 8 animal crackers or 5 vanilla wafers (80)    ¨ 1 frozen fruit juice bar (80)    ¨ ½ cup of ice milk or low-fat frozen yogurt (90)    ¨ ½ cup of sherbet or sorbet (125)    ¨ ½ cup of sugar-free pudding or custard (60)    ¨ ½ cup of ice cream (140)    ¨ ½ cup of pudding or custard (175)    ¨ 1 (2-inch) square chocolate brownie (185)    · Combination foods:      ¨ Bean burrito made with an 8-inch tortilla, without cheese (275)    ¨ Chicken breast sandwich with lettuce and tomato (325)    ¨ 1 cup of chicken noodle soup (60)    ¨ 1 beef taco (175)    ¨ Regular hamburger with lettuce and tomato (310)    ¨ Regular cheeseburger with lettuce and tomato (410)     ¨ ¼ of a 12-inch cheese pizza (280)    ¨ Fried fish sandwich with lettuce and tomato (425)    ¨ Hot dog and bun (275)    ¨ 1½ cups of macaroni and cheese (310)    ¨ Taco salad with a fried tortilla shell (870)    · Low-calorie foods:      ¨ 1 tablespoon of ketchup or 1 tablespoon of fat free sour cream (15)    ¨ 1 teaspoon of mustard (5)    ¨ ¼ cup of salsa (20)    ¨ 1 large dill pickle (15)    ¨ 1 tablespoon of fat free salad dressing (10)    ¨ 2 teaspoons of low-sugar, light jam or jelly, or 1 tablespoon of sugar-free syrup (15)    ¨ 1 sugar-free popsicle (15)    ¨ 1 cup of club soda, seltzer water, or diet soda (0)  CARE AGREEMENT:   You have the right to help plan your care  Discuss treatment options with your caregivers to decide what care you want to receive  You always have the right to refuse treatment  The above information is an  only  It is not intended as medical advice for individual conditions or treatments  Talk to your doctor, nurse or pharmacist before following any medical regimen to see if it is safe and effective for you  © 2017 [employer] [employer] Information is for End User's use only and may not be sold, redistributed or otherwise used for commercial purposes   All illustrations and images included in CareNotes® are the copyrighted property of [employer]  or [employer]

## 2019-09-23 ENCOUNTER — APPOINTMENT (OUTPATIENT)
Dept: LAB | Age: 44
End: 2019-09-23
Payer: COMMERCIAL

## 2019-09-23 DIAGNOSIS — Z11.59 ENCOUNTER FOR HEPATITIS C SCREENING TEST FOR LOW RISK PATIENT: ICD-10-CM

## 2019-09-23 DIAGNOSIS — K21.9 GASTROESOPHAGEAL REFLUX DISEASE WITHOUT ESOPHAGITIS: ICD-10-CM

## 2019-09-23 DIAGNOSIS — F41.0 PANIC ATTACKS: ICD-10-CM

## 2019-09-23 DIAGNOSIS — E01.0 THYROMEGALY: ICD-10-CM

## 2019-09-23 LAB
25(OH)D3 SERPL-MCNC: 46.2 NG/ML (ref 30–100)
ALBUMIN SERPL BCP-MCNC: 4.6 G/DL (ref 3.5–5)
ALP SERPL-CCNC: 46 U/L (ref 46–116)
ALT SERPL W P-5'-P-CCNC: 20 U/L (ref 12–78)
ANION GAP SERPL CALCULATED.3IONS-SCNC: 10 MMOL/L (ref 4–13)
AST SERPL W P-5'-P-CCNC: 15 U/L (ref 5–45)
BACTERIA UR QL AUTO: ABNORMAL /HPF
BASOPHILS # BLD AUTO: 0.04 THOUSANDS/ΜL (ref 0–0.1)
BASOPHILS NFR BLD AUTO: 1 % (ref 0–1)
BILIRUB SERPL-MCNC: 0.51 MG/DL (ref 0.2–1)
BILIRUB UR QL STRIP: NEGATIVE
BUN SERPL-MCNC: 11 MG/DL (ref 5–25)
CALCIUM SERPL-MCNC: 9.6 MG/DL (ref 8.3–10.1)
CHLORIDE SERPL-SCNC: 105 MMOL/L (ref 100–108)
CHOLEST SERPL-MCNC: 182 MG/DL (ref 50–200)
CLARITY UR: ABNORMAL
CO2 SERPL-SCNC: 25 MMOL/L (ref 21–32)
COLOR UR: YELLOW
CREAT SERPL-MCNC: 0.71 MG/DL (ref 0.6–1.3)
EOSINOPHIL # BLD AUTO: 0.13 THOUSAND/ΜL (ref 0–0.61)
EOSINOPHIL NFR BLD AUTO: 2 % (ref 0–6)
ERYTHROCYTE [DISTWIDTH] IN BLOOD BY AUTOMATED COUNT: 12.1 % (ref 11.6–15.1)
EST. AVERAGE GLUCOSE BLD GHB EST-MCNC: 103 MG/DL
GFR SERPL CREATININE-BSD FRML MDRD: 105 ML/MIN/1.73SQ M
GLUCOSE P FAST SERPL-MCNC: 87 MG/DL (ref 65–99)
GLUCOSE UR STRIP-MCNC: NEGATIVE MG/DL
HBA1C MFR BLD: 5.2 % (ref 4.2–6.3)
HCT VFR BLD AUTO: 40.2 % (ref 34.8–46.1)
HDLC SERPL-MCNC: 68 MG/DL (ref 40–60)
HGB BLD-MCNC: 12.7 G/DL (ref 11.5–15.4)
HGB UR QL STRIP.AUTO: NEGATIVE
IMM GRANULOCYTES # BLD AUTO: 0.01 THOUSAND/UL (ref 0–0.2)
IMM GRANULOCYTES NFR BLD AUTO: 0 % (ref 0–2)
KETONES UR STRIP-MCNC: NEGATIVE MG/DL
LDLC SERPL CALC-MCNC: 99 MG/DL (ref 0–100)
LEUKOCYTE ESTERASE UR QL STRIP: ABNORMAL
LYMPHOCYTES # BLD AUTO: 2.17 THOUSANDS/ΜL (ref 0.6–4.47)
LYMPHOCYTES NFR BLD AUTO: 33 % (ref 14–44)
MAGNESIUM SERPL-MCNC: 2.5 MG/DL (ref 1.6–2.6)
MCH RBC QN AUTO: 28.1 PG (ref 26.8–34.3)
MCHC RBC AUTO-ENTMCNC: 31.6 G/DL (ref 31.4–37.4)
MCV RBC AUTO: 89 FL (ref 82–98)
MONOCYTES # BLD AUTO: 0.37 THOUSAND/ΜL (ref 0.17–1.22)
MONOCYTES NFR BLD AUTO: 6 % (ref 4–12)
NEUTROPHILS # BLD AUTO: 3.92 THOUSANDS/ΜL (ref 1.85–7.62)
NEUTS SEG NFR BLD AUTO: 58 % (ref 43–75)
NITRITE UR QL STRIP: NEGATIVE
NON-SQ EPI CELLS URNS QL MICRO: ABNORMAL /HPF
NONHDLC SERPL-MCNC: 114 MG/DL
NRBC BLD AUTO-RTO: 0 /100 WBCS
PH UR STRIP.AUTO: 6.5 [PH]
PLATELET # BLD AUTO: 211 THOUSANDS/UL (ref 149–390)
PMV BLD AUTO: 10.9 FL (ref 8.9–12.7)
POTASSIUM SERPL-SCNC: 3.9 MMOL/L (ref 3.5–5.3)
PROT SERPL-MCNC: 8.3 G/DL (ref 6.4–8.2)
PROT UR STRIP-MCNC: NEGATIVE MG/DL
RBC # BLD AUTO: 4.52 MILLION/UL (ref 3.81–5.12)
RBC #/AREA URNS AUTO: ABNORMAL /HPF
SODIUM SERPL-SCNC: 140 MMOL/L (ref 136–145)
SP GR UR STRIP.AUTO: 1.02 (ref 1–1.03)
T4 FREE SERPL-MCNC: 1.03 NG/DL (ref 0.76–1.46)
TRIGL SERPL-MCNC: 77 MG/DL
TSH SERPL DL<=0.05 MIU/L-ACNC: 1.51 UIU/ML (ref 0.36–3.74)
UROBILINOGEN UR QL STRIP.AUTO: 0.2 E.U./DL
VIT B12 SERPL-MCNC: 644 PG/ML (ref 100–900)
WBC # BLD AUTO: 6.64 THOUSAND/UL (ref 4.31–10.16)
WBC #/AREA URNS AUTO: ABNORMAL /HPF

## 2019-09-23 PROCEDURE — 84439 ASSAY OF FREE THYROXINE: CPT

## 2019-09-23 PROCEDURE — 81001 URINALYSIS AUTO W/SCOPE: CPT | Performed by: INTERNAL MEDICINE

## 2019-09-23 PROCEDURE — 86800 THYROGLOBULIN ANTIBODY: CPT

## 2019-09-23 PROCEDURE — 80053 COMPREHEN METABOLIC PANEL: CPT

## 2019-09-23 PROCEDURE — 84443 ASSAY THYROID STIM HORMONE: CPT

## 2019-09-23 PROCEDURE — 83036 HEMOGLOBIN GLYCOSYLATED A1C: CPT

## 2019-09-23 PROCEDURE — 36415 COLL VENOUS BLD VENIPUNCTURE: CPT

## 2019-09-23 PROCEDURE — 86376 MICROSOMAL ANTIBODY EACH: CPT

## 2019-09-23 PROCEDURE — 85025 COMPLETE CBC W/AUTO DIFF WBC: CPT

## 2019-09-23 PROCEDURE — 86803 HEPATITIS C AB TEST: CPT

## 2019-09-23 PROCEDURE — 83735 ASSAY OF MAGNESIUM: CPT

## 2019-09-23 PROCEDURE — 82607 VITAMIN B-12: CPT

## 2019-09-23 PROCEDURE — 80061 LIPID PANEL: CPT

## 2019-09-23 PROCEDURE — 82306 VITAMIN D 25 HYDROXY: CPT

## 2019-09-24 LAB
HCV AB SER QL: ABNORMAL
THYROGLOB AB SERPL-ACNC: <1 IU/ML (ref 0–0.9)
THYROPEROXIDASE AB SERPL-ACNC: 8 IU/ML (ref 0–34)

## 2019-10-15 ENCOUNTER — CLINICAL SUPPORT (OUTPATIENT)
Dept: OBGYN CLINIC | Facility: CLINIC | Age: 44
End: 2019-10-15

## 2019-10-15 VITALS
HEART RATE: 71 BPM | DIASTOLIC BLOOD PRESSURE: 76 MMHG | WEIGHT: 172 LBS | SYSTOLIC BLOOD PRESSURE: 126 MMHG | BODY MASS INDEX: 29.07 KG/M2

## 2019-10-15 DIAGNOSIS — Z30.42 ENCOUNTER FOR SURVEILLANCE OF INJECTABLE CONTRACEPTIVE: Primary | ICD-10-CM

## 2019-10-15 PROCEDURE — 96372 THER/PROPH/DIAG INJ SC/IM: CPT

## 2019-10-15 RX ORDER — MEDROXYPROGESTERONE ACETATE 150 MG/ML
150 INJECTION, SUSPENSION INTRAMUSCULAR ONCE
Status: COMPLETED | OUTPATIENT
Start: 2019-10-15 | End: 2019-10-15

## 2019-10-15 RX ADMIN — MEDROXYPROGESTERONE ACETATE 150 MG: 150 INJECTION, SUSPENSION INTRAMUSCULAR at 13:24

## 2019-10-15 NOTE — PROGRESS NOTES
Depo-Provera     Last  Annual Date: 2/2019  [facility] Last Depo date: 7/23/19   Side effects: None   Given by: Ivania Xiao MA   Site: Left buttock   Next depo:12/31/2019   Pt aware annual is due in February 2020     Calcium supplement daily teaching, condoms for 2 weeks following first injection dose 
children/spouse

## 2019-12-31 ENCOUNTER — CLINICAL SUPPORT (OUTPATIENT)
Dept: OBGYN CLINIC | Facility: CLINIC | Age: 44
End: 2019-12-31

## 2019-12-31 DIAGNOSIS — Z30.42 ENCOUNTER FOR SURVEILLANCE OF INJECTABLE CONTRACEPTIVE: Primary | ICD-10-CM

## 2019-12-31 PROCEDURE — 96372 THER/PROPH/DIAG INJ SC/IM: CPT

## 2019-12-31 RX ORDER — MEDROXYPROGESTERONE ACETATE 150 MG/ML
150 INJECTION, SUSPENSION INTRAMUSCULAR ONCE
Status: COMPLETED | OUTPATIENT
Start: 2019-12-31 | End: 2019-12-31

## 2019-12-31 RX ADMIN — MEDROXYPROGESTERONE ACETATE 150 MG: 150 INJECTION, SUSPENSION INTRAMUSCULAR at 10:37

## 2019-12-31 NOTE — PROGRESS NOTES
Depo-Provera     Last  Annual Date: 2/2018  [facility] Last Depo date: 10/15/19   Side effects: None   Given by: Favio Pagan MA   Site: Right buttock   Calcium supplement daily teaching, condoms for 2 weeks following first injection dose

## 2020-05-04 ENCOUNTER — TELEPHONE (OUTPATIENT)
Dept: OBGYN CLINIC | Facility: CLINIC | Age: 45
End: 2020-05-04

## 2020-06-15 DIAGNOSIS — Z30.42 ENCOUNTER FOR SURVEILLANCE OF INJECTABLE CONTRACEPTIVE: ICD-10-CM

## 2020-06-15 DIAGNOSIS — Z30.42 ENCOUNTER FOR SURVEILLANCE OF INJECTABLE CONTRACEPTIVE: Primary | ICD-10-CM

## 2020-06-15 RX ORDER — MEDROXYPROGESTERONE ACETATE 150 MG/ML
150 INJECTION, SUSPENSION INTRAMUSCULAR
Qty: 1 ML | Refills: 1 | Status: SHIPPED | OUTPATIENT
Start: 2020-06-15 | End: 2021-08-23

## 2020-06-15 RX ORDER — MEDROXYPROGESTERONE ACETATE 150 MG/ML
INJECTION, SUSPENSION INTRAMUSCULAR
Qty: 1 ML | Refills: 0 | Status: SHIPPED | OUTPATIENT
Start: 2020-06-15 | End: 2021-08-23

## 2020-06-16 ENCOUNTER — CLINICAL SUPPORT (OUTPATIENT)
Dept: OBGYN CLINIC | Facility: CLINIC | Age: 45
End: 2020-06-16

## 2020-06-16 VITALS
WEIGHT: 180.8 LBS | HEIGHT: 65 IN | SYSTOLIC BLOOD PRESSURE: 138 MMHG | HEART RATE: 90 BPM | DIASTOLIC BLOOD PRESSURE: 85 MMHG | BODY MASS INDEX: 30.12 KG/M2

## 2020-06-16 DIAGNOSIS — Z30.09 UNWANTED FERTILITY: Primary | ICD-10-CM

## 2020-06-16 LAB — SL AMB POCT URINE HCG: NEGATIVE

## 2020-06-16 PROCEDURE — 81025 URINE PREGNANCY TEST: CPT

## 2020-06-16 PROCEDURE — 96372 THER/PROPH/DIAG INJ SC/IM: CPT

## 2020-06-16 RX ORDER — MEDROXYPROGESTERONE ACETATE 150 MG/ML
150 INJECTION, SUSPENSION INTRAMUSCULAR ONCE
Status: COMPLETED | OUTPATIENT
Start: 2020-06-16 | End: 2020-06-16

## 2020-06-16 RX ADMIN — MEDROXYPROGESTERONE ACETATE 150 MG: 150 INJECTION, SUSPENSION INTRAMUSCULAR at 13:41

## 2020-09-04 ENCOUNTER — TELEPHONE (OUTPATIENT)
Dept: OBGYN CLINIC | Facility: CLINIC | Age: 45
End: 2020-09-04

## 2020-09-08 ENCOUNTER — ANNUAL EXAM (OUTPATIENT)
Dept: OBGYN CLINIC | Facility: CLINIC | Age: 45
End: 2020-09-08

## 2020-09-08 VITALS
BODY MASS INDEX: 30.16 KG/M2 | HEIGHT: 65 IN | TEMPERATURE: 97.9 F | HEART RATE: 88 BPM | DIASTOLIC BLOOD PRESSURE: 79 MMHG | WEIGHT: 181 LBS | SYSTOLIC BLOOD PRESSURE: 131 MMHG

## 2020-09-08 DIAGNOSIS — Z01.419 ENCOUNTER FOR ANNUAL ROUTINE GYNECOLOGICAL EXAMINATION: Primary | ICD-10-CM

## 2020-09-08 DIAGNOSIS — Z12.31 ENCOUNTER FOR SCREENING MAMMOGRAM FOR MALIGNANT NEOPLASM OF BREAST: ICD-10-CM

## 2020-09-08 DIAGNOSIS — Z30.42 ENCOUNTER FOR SURVEILLANCE OF INJECTABLE CONTRACEPTIVE: ICD-10-CM

## 2020-09-08 PROCEDURE — 96372 THER/PROPH/DIAG INJ SC/IM: CPT | Performed by: NURSE PRACTITIONER

## 2020-09-08 PROCEDURE — 99396 PREV VISIT EST AGE 40-64: CPT | Performed by: NURSE PRACTITIONER

## 2020-09-08 PROCEDURE — 3008F BODY MASS INDEX DOCD: CPT | Performed by: NURSE PRACTITIONER

## 2020-09-08 PROCEDURE — G0145 SCR C/V CYTO,THINLAYER,RESCR: HCPCS | Performed by: NURSE PRACTITIONER

## 2020-09-08 PROCEDURE — 87624 HPV HI-RISK TYP POOLED RSLT: CPT | Performed by: NURSE PRACTITIONER

## 2020-09-08 PROCEDURE — 1036F TOBACCO NON-USER: CPT | Performed by: NURSE PRACTITIONER

## 2020-09-08 RX ORDER — MEDROXYPROGESTERONE ACETATE 150 MG/ML
150 INJECTION, SUSPENSION INTRAMUSCULAR
Qty: 1 ML | Refills: 5 | Status: SHIPPED | OUTPATIENT
Start: 2020-09-08 | End: 2021-07-28 | Stop reason: SDUPTHER

## 2020-09-08 RX ORDER — MEDROXYPROGESTERONE ACETATE 150 MG/ML
150 INJECTION, SUSPENSION INTRAMUSCULAR ONCE
Status: COMPLETED | OUTPATIENT
Start: 2020-09-08 | End: 2020-09-08

## 2020-09-08 RX ADMIN — MEDROXYPROGESTERONE ACETATE 150 MG: 150 INJECTION, SUSPENSION INTRAMUSCULAR at 11:19

## 2020-09-08 NOTE — LETTER
9/15/2020    To Colette Mclaughlin  : 1975      This letter is to advise you that your recent PAP SMEAR results were reviewed by me and are NORMAL    We will see you in 1 year for your annual exam     Carmen Hernandez

## 2020-09-08 NOTE — PROGRESS NOTES
Annual Exam    Assessment   1  Encounter for annual routine gynecological examination  Liquid-based pap, screening   2  Encounter for surveillance of injectable contraceptive  medroxyPROGESTERone (DEPO-PROVERA) 150 mg/mL injection    medroxyPROGESTERone acetate (DEPO-PROVERA SYRINGE) IM injection 150 mg   3  Encounter for screening mammogram for malignant neoplasm of breast  Mammo screening bilateral w cad     well woman       Plan       All questions answered  Breast self exam technique reviewed and patient encouraged to perform self-exam monthly  Contraception: Depo-Provera injections  Discussed healthy lifestyle modifications  Follow up in 1 year  Thin prep Pap smear  Patient Instructions   Make mammogram appointment  PAP results can take up to 2 weeks  Call with needs or conncerns  Return in 1 year for annual GYN exam  Return in 3 months for next Depoprovera  Pt verbalized understanding of all discussed  Leyla Ball is a 41 y o  [id number] female who presents for annual well woman exam  Periods are not occurring with Depoprovera, lasting 0 days  No vaginal  Discharge  Unsure when last PAP was collected, states all are WNL  1 partner x 25 years, denies domestic violence    Current contraception: Depo-Provera injections  History of abnormal Pap smear: no  Family history of uterine or ovarian cancer: no  Regular self breast exam: yes  History of abnormal mammogram: no  Family history of breast cancer: no  History of abnormal lipids: unknown  Menstrual History:  OB History        4    Para   2    Term   2            AB   2    Living   2       SAB        TAB   2    Ectopic        Multiple        Live Births   2                Menarche age: 6  No LMP recorded (lmp unknown)  Patient has had an injection         The following portions of the patient's history were reviewed and updated as appropriate: allergies, current medications, past family history, past medical history, past social history, past surgical history and problem list     Review of Systems  Pertinent items are noted in HPI        Objective      /79   Pulse 88   Temp 97 9 °F (36 6 °C)   Ht 5' 5" (1 651 m)   Wt 82 1 kg (181 lb)   LMP  (LMP Unknown)   BMI 30 12 kg/m²     General: alert and oriented, in no acute distress, alert, appears stated age and cooperative   Heart: regular rate and rhythm, S1, S2 normal, no murmur, click, rub or gallop   Lungs: clear to auscultation bilaterally, WNL respiratory effort, negative cough or SOB   Thyroid: Negative masses   Abdomen: soft, non-tender, without masses or organomegaly   Vulva: normal   Vagina: normal mucosa   Cervix: no bleeding following Pap, no cervical motion tenderness and no lesions   Uterus: normal size, non-tender, normal shape and consistency   Adnexa: normal adnexa   Urethra: normal   Breasts: NT,negative masses, discharge, or dimpling  Negative fever

## 2020-09-08 NOTE — PROGRESS NOTES
Depo-Provera         Patient provided box    Last  Annual Date: 9/8/20  [facility] Last Depo date: 6/16/20   Side effects: no   Given by: Deana Lopez Site: Right Buttock     Calcium supplement daily teaching, condoms for 2 weeks following first injection dose

## 2020-09-08 NOTE — PATIENT INSTRUCTIONS
Make mammogram appointment  PAP results can take up to 2 weeks  Call with needs or [place]  Return in 1 year for annual GYN exam  Return in 3 months for next Depoprovera    COVID-19 Instructions    If you are having any of the following:  Cough   Shortness of breath   Fever  If traveled within past 2 weeks internationally or to high risk [place] states  Or been in contact with someone that has     Please call either:   Your PCP office  [phone], option [phone]    They will screen you over the phone and direct you to the nearest appropriate testing location    DO NOT go to your PCP or OB office without calling first

## 2020-09-09 LAB
HPV HR 12 DNA CVX QL NAA+PROBE: NEGATIVE
HPV16 DNA CVX QL NAA+PROBE: NEGATIVE
HPV18 DNA CVX QL NAA+PROBE: NEGATIVE

## 2020-09-15 LAB
LAB AP GYN PRIMARY INTERPRETATION: NORMAL
Lab: NORMAL

## 2020-09-25 ENCOUNTER — TELEPHONE (OUTPATIENT)
Dept: FAMILY MEDICINE CLINIC | Facility: CLINIC | Age: 45
End: 2020-09-25

## 2020-09-28 ENCOUNTER — OFFICE VISIT (OUTPATIENT)
Dept: URGENT CARE | Age: 45
End: 2020-09-28
Payer: COMMERCIAL

## 2020-09-28 VITALS
HEART RATE: 94 BPM | BODY MASS INDEX: 30.92 KG/M2 | TEMPERATURE: 97.2 F | HEIGHT: 65 IN | OXYGEN SATURATION: 98 % | RESPIRATION RATE: 20 BRPM | WEIGHT: 185.6 LBS

## 2020-09-28 DIAGNOSIS — L30.4 INTERTRIGO: Primary | ICD-10-CM

## 2020-09-28 PROCEDURE — 99213 OFFICE O/P EST LOW 20 MIN: CPT | Performed by: PHYSICIAN ASSISTANT

## 2020-09-28 RX ORDER — NYSTATIN 100000 [USP'U]/G
POWDER TOPICAL 4 TIMES DAILY
Qty: 15 G | Refills: 0 | Status: SHIPPED | OUTPATIENT
Start: 2020-09-28 | End: 2021-08-23

## 2020-09-28 NOTE — PROGRESS NOTES
[address] [facility]        NAME: Nicole Dowling is a 41 y o  female  : 1975    MRN: [id number]  DATE: 2020  TIME: 2:31 PM    Assessment and Plan   Intertrigo [L30 4]  1  Intertrigo           Patient Instructions     Use nystatin powder as directed  Follow up with PCP in 3-5 days  Proceed to  ER if symptoms worsen  Chief Complaint     Chief Complaint   Patient presents with    Rash     Pt reports several day hx of rash under abdominal fold with redness and burning  Unsure of drainage  Using Nystatin poweder and Chlotrimazole Betamethasone ointment without relief  History of Present Illness       Rash   This is a new problem  The current episode started yesterday  The problem is unchanged  The affected locations include the abdomen (Abdominal skin fold)  The rash is characterized by redness, pain and burning  She was exposed to nothing  Pertinent negatives include no anorexia, congestion, eye pain, facial edema, fatigue, fever, rhinorrhea, shortness of breath, sore throat or vomiting  Treatments tried: nystatin powder  Review of Systems   Review of Systems   Constitutional: Negative for chills, fatigue and fever  HENT: Negative for congestion, ear pain, hearing loss, postnasal drip, rhinorrhea, sinus pressure, sinus pain and sore throat  Eyes: Negative for pain and discharge  Respiratory: Negative for chest tightness and shortness of breath  Cardiovascular: Negative for chest pain  Gastrointestinal: Negative for abdominal pain, anorexia, constipation, nausea and vomiting  Genitourinary: Negative for difficulty urinating  Musculoskeletal: Negative for arthralgias and myalgias  Skin: Positive for rash  Neurological: Negative for dizziness and headaches  Psychiatric/Behavioral: Negative for behavioral problems           Current Medications       Current Outpatient Medications:     ALPRAZolam (XANAX) 2 MG tablet, 2 mg 3 (three) times a day  , Disp: , Rfl:     Buprenorphine HCl-Naloxone HCl (ZUBSOLV) 8 6-2 1 MG SUBL, , Disp: , Rfl:     clotrimazole-betamethasone (LOTRISONE) 1-0 05 % cream, Apply topically 2 (two) times a day Apply abdominal wall, Disp: 30 g, Rfl: 2    medroxyPROGESTERone (DEPO-PROVERA) 150 mg/mL injection, Inject 1 mL (150 mg total) into a muscle every 3 (three) months, Disp: 1 mL, Rfl: 1    promethazine (PHENERGAN) 25 mg tablet, Take 1 tablet (25 mg total) by mouth daily at bedtime, Disp: 30 tablet, Rfl: 3    medroxyPROGESTERone (DEPO-PROVERA) 150 mg/mL injection, Inject 1 mL (150 mg total) into a muscle every 3 (three) months (Patient not taking: Reported on 9/28/2020), Disp: 1 mL, Rfl: 5    medroxyPROGESTERone acetate (DEPO-PROVERA SYRINGE) 150 mg/mL injection, INJECT 1ML INTO THE MUSCLE EVERY 3 MONTHS (Patient not taking: Reported on 9/28/2020), Disp: 1 mL, Rfl: 0    Current Allergies     Allergies as of 09/28/2020 - Reviewed 09/28/2020   Allergen Reaction Noted    Ciprofloxacin Anaphylaxis 01/07/2019    Codeine Lightheadedness 08/28/2001    Morphine Lightheadedness 08/28/2001    Sulfa antibiotics Anaphylaxis and Rash 09/28/2017    Tramadol Lightheadedness 09/28/2017            The following portions of the patient's history were reviewed and updated as appropriate: allergies, current medications, past family history, past medical history, past social history, past surgical history and problem list      Past Medical History:   Diagnosis Date    Arthritis     Endometriosis     no current issues    Infectious viral hepatitis     history of hep c    Irritable bowel syndrome        Past Surgical History:   Procedure Laterality Date    APPENDECTOMY      CHOLECYSTECTOMY      HEMORRHOID SURGERY         Family History   Problem Relation Age of Onset    Cancer Father     No Known Problems Mother          Medications have been verified          Objective   Pulse 94   Temp (!) 97 2 °F (36 2 °C)   Resp 20   Ht 5' 5" (1 651 m)   Wt 84 2 kg (185 lb 9 6 oz)   LMP  (LMP Unknown)   SpO2 98%   BMI 30 89 kg/m²        Physical Exam     Physical Exam  Vitals signs and nursing note reviewed  Constitutional:       Appearance: She is well-developed  HENT:      Right Ear: Tympanic membrane and external ear normal       Left Ear: Tympanic membrane and external ear normal    Neck:      Musculoskeletal: Normal range of motion  No edema  Cardiovascular:      Rate and Rhythm: Normal rate and regular rhythm  Heart sounds: Normal heart sounds, S1 normal and S2 normal  No murmur  Pulmonary:      Effort: Pulmonary effort is normal  No respiratory distress  Breath sounds: Normal breath sounds  No wheezing or rales  Chest:      Chest wall: No tenderness  Lymphadenopathy:      Cervical: No cervical adenopathy  Skin:     General: Skin is warm and dry  Capillary Refill: Capillary refill takes less than 2 seconds  Findings: No rash  Neurological:      Mental Status: She is alert and oriented to person, place, and time     Psychiatric:         Speech: Speech normal          Behavior: Behavior normal

## 2020-09-29 ENCOUNTER — TELEPHONE (OUTPATIENT)
Dept: FAMILY MEDICINE CLINIC | Facility: CLINIC | Age: 45
End: 2020-09-29

## 2020-10-07 ENCOUNTER — TELEPHONE (OUTPATIENT)
Dept: FAMILY MEDICINE CLINIC | Facility: CLINIC | Age: 45
End: 2020-10-07

## 2020-10-07 ENCOUNTER — OFFICE VISIT (OUTPATIENT)
Dept: URGENT CARE | Age: 45
End: 2020-10-07
Payer: COMMERCIAL

## 2020-10-07 VITALS
BODY MASS INDEX: 30.82 KG/M2 | HEART RATE: 102 BPM | HEIGHT: 65 IN | OXYGEN SATURATION: 95 % | RESPIRATION RATE: 18 BRPM | WEIGHT: 185 LBS | TEMPERATURE: 97.4 F

## 2020-10-07 DIAGNOSIS — J98.8 CONGESTION OF UPPER AIRWAY: Primary | ICD-10-CM

## 2020-10-07 PROCEDURE — U0003 INFECTIOUS AGENT DETECTION BY NUCLEIC ACID (DNA OR RNA); SEVERE ACUTE RESPIRATORY SYNDROME CORONAVIRUS 2 (SARS-COV-2) (CORONAVIRUS DISEASE [COVID-19]), AMPLIFIED PROBE TECHNIQUE, MAKING USE OF HIGH THROUGHPUT TECHNOLOGIES AS DESCRIBED BY CMS-2020-01-R: HCPCS | Performed by: NURSE PRACTITIONER

## 2020-10-07 PROCEDURE — 99213 OFFICE O/P EST LOW 20 MIN: CPT | Performed by: NURSE PRACTITIONER

## 2020-10-10 ENCOUNTER — TELEPHONE (OUTPATIENT)
Dept: URGENT CARE | Age: 45
End: 2020-10-10

## 2020-10-10 ENCOUNTER — TELEPHONE (OUTPATIENT)
Dept: URGENT CARE | Facility: CLINIC | Age: 45
End: 2020-10-10

## 2020-10-10 LAB — SARS-COV-2 RNA SPEC QL NAA+PROBE: NOT DETECTED

## 2020-10-12 ENCOUNTER — TELEPHONE (OUTPATIENT)
Dept: OBGYN CLINIC | Facility: CLINIC | Age: 45
End: 2020-10-12

## 2020-11-27 ENCOUNTER — TELEPHONE (OUTPATIENT)
Dept: OBGYN CLINIC | Facility: CLINIC | Age: 45
End: 2020-11-27

## 2020-11-30 ENCOUNTER — CLINICAL SUPPORT (OUTPATIENT)
Dept: OBGYN CLINIC | Facility: CLINIC | Age: 45
End: 2020-11-30

## 2020-11-30 VITALS
BODY MASS INDEX: 31.35 KG/M2 | DIASTOLIC BLOOD PRESSURE: 84 MMHG | HEART RATE: 81 BPM | SYSTOLIC BLOOD PRESSURE: 141 MMHG | WEIGHT: 188.4 LBS

## 2020-11-30 DIAGNOSIS — Z30.42 ENCOUNTER FOR SURVEILLANCE OF INJECTABLE CONTRACEPTIVE: Primary | ICD-10-CM

## 2020-11-30 PROCEDURE — 96372 THER/PROPH/DIAG INJ SC/IM: CPT

## 2020-11-30 RX ORDER — MEDROXYPROGESTERONE ACETATE 150 MG/ML
150 INJECTION, SUSPENSION INTRAMUSCULAR ONCE
Status: COMPLETED | OUTPATIENT
Start: 2020-11-30 | End: 2020-11-30

## 2020-11-30 RX ADMIN — MEDROXYPROGESTERONE ACETATE 150 MG: 150 INJECTION, SUSPENSION INTRAMUSCULAR at 09:01

## 2021-02-23 ENCOUNTER — CLINICAL SUPPORT (OUTPATIENT)
Dept: OBGYN CLINIC | Facility: CLINIC | Age: 46
End: 2021-02-23

## 2021-02-23 VITALS
DIASTOLIC BLOOD PRESSURE: 83 MMHG | SYSTOLIC BLOOD PRESSURE: 124 MMHG | WEIGHT: 190 LBS | HEART RATE: 93 BPM | BODY MASS INDEX: 31.62 KG/M2

## 2021-02-23 DIAGNOSIS — Z30.42 ENCOUNTER FOR SURVEILLANCE OF INJECTABLE CONTRACEPTIVE: Primary | ICD-10-CM

## 2021-02-23 PROCEDURE — 96372 THER/PROPH/DIAG INJ SC/IM: CPT | Performed by: OBSTETRICS & GYNECOLOGY

## 2021-02-23 RX ORDER — MEDROXYPROGESTERONE ACETATE 150 MG/ML
150 INJECTION, SUSPENSION INTRAMUSCULAR ONCE
Status: COMPLETED | OUTPATIENT
Start: 2021-02-23 | End: 2021-02-23

## 2021-02-23 RX ADMIN — MEDROXYPROGESTERONE ACETATE 150 MG: 150 INJECTION, SUSPENSION INTRAMUSCULAR at 10:33

## 2021-03-17 ENCOUNTER — TELEPHONE (OUTPATIENT)
Dept: PSYCHIATRY | Facility: CLINIC | Age: 46
End: 2021-03-17

## 2021-03-17 NOTE — TELEPHONE ENCOUNTER
Roman Simpson called and is looking for a Psychiatrist  Her chart is up to date and can be reached anytime at [phone]

## 2021-03-19 ENCOUNTER — TELEPHONE (OUTPATIENT)
Dept: PSYCHIATRY | Facility: CLINIC | Age: 46
End: 2021-03-19

## 2021-03-19 NOTE — TELEPHONE ENCOUNTER
Behavorial Health Outpatient Intake Questions    Referred by: Self referral    Please advised interviewee that they need to answer all questions truthfully to allow for best care and any misrepresentations of information may affect their ability to be seen at this clinic   => Was this discussed? Yes     Behavorial Health Outpatient Intake History -     Presenting Problem (in patient's words):   Patient currently receiving dual diagnosis services through [facility]  Program will be changing policies and patient cannot continue care with her current psychiatrist  Patient was referred to continue care for substance abuse at [facility], however; she was referred to establish care with a psychiatrist   Depression and anxiety  Are there any developmental disabilities? ? If yes, can they speak to you on the phone? If they are too limited to speak to you on phone, refer out No    Are you taking any psychiatric medications? Yes    => If yes, who prescribes? If yes, are they injectable medications? ALPRAZolam (XANAX) 2 MG tablet - Psychiatrist       Does the patient have a language barrier or hearing impairment? No    Have you been treated at [address] [place] [address] by a therapist or a doctor in the past? If yes, who? No    Has the patient been hospitalized for mental health? Yes   If yes, how long ago was last hospitalization and where was it? 3 1/2 years [facility] you actively use alcohol or marijuana or illegal substances? If yes, what and how much - refer out to Drug and alcohol treatment if use is excessive or daily use of illegal substances No concerns of substance abuse are reported  Patient is being treated for substance abuse  Do you have a community treatment team or ? No    Legal History-     Does the patient have any history of arrests, long-term/jail time, or DUIs? Yes  If Yes-  1) What types of charges? 2) When were they last incarcerated?   3) Are they currently on parole or probation? Minor Child-    Who has custody of the child? Is there a custody agreement? If there is a custody agreement remind parent that they must bring a copy to the first appt or they will not be seen  Intake Team, please check with provider before scheduling if flags come up such as:  - complex case  - legal history (other than DUI)  - communication barrier concerns are present  - if, in your judgment, this needs further review    ACCEPTED as a patient Yes  => Appointment Date: 06/30/21 at 11:010 a m with Dr Lily Subramanian? No    Name of Insurance Co: Don ID# [id number]  [id number] Phone #  If ins is primary or secondary  If patient is a minor, parents information such as Name, D  O B of guarantor

## 2021-04-13 DIAGNOSIS — Z23 ENCOUNTER FOR IMMUNIZATION: ICD-10-CM

## 2021-04-21 ENCOUNTER — IMMUNIZATIONS (OUTPATIENT)
Dept: FAMILY MEDICINE CLINIC | Facility: HOSPITAL | Age: 46
End: 2021-04-21

## 2021-04-21 DIAGNOSIS — Z23 ENCOUNTER FOR IMMUNIZATION: Primary | ICD-10-CM

## 2021-04-21 PROCEDURE — 0011A SARS-COV-2 / COVID-19 MRNA VACCINE (MODERNA) 100 MCG: CPT

## 2021-04-21 PROCEDURE — 91301 SARS-COV-2 / COVID-19 MRNA VACCINE (MODERNA) 100 MCG: CPT

## 2021-05-17 ENCOUNTER — CLINICAL SUPPORT (OUTPATIENT)
Dept: OBGYN CLINIC | Facility: CLINIC | Age: 46
End: 2021-05-17

## 2021-05-17 VITALS
HEART RATE: 89 BPM | BODY MASS INDEX: 30.79 KG/M2 | SYSTOLIC BLOOD PRESSURE: 123 MMHG | DIASTOLIC BLOOD PRESSURE: 81 MMHG | WEIGHT: 185 LBS

## 2021-05-17 DIAGNOSIS — Z30.42 ENCOUNTER FOR SURVEILLANCE OF INJECTABLE CONTRACEPTIVE: Primary | ICD-10-CM

## 2021-05-17 PROCEDURE — 96372 THER/PROPH/DIAG INJ SC/IM: CPT | Performed by: OBSTETRICS & GYNECOLOGY

## 2021-05-17 RX ORDER — MEDROXYPROGESTERONE ACETATE 150 MG/ML
150 INJECTION, SUSPENSION INTRAMUSCULAR ONCE
Status: COMPLETED | OUTPATIENT
Start: 2021-05-17 | End: 2021-05-17

## 2021-05-17 RX ADMIN — MEDROXYPROGESTERONE ACETATE 150 MG: 150 INJECTION, SUSPENSION INTRAMUSCULAR at 15:03

## 2021-05-17 NOTE — PROGRESS NOTES
Pt here today for Depo injection  Given in the left hip   TKS#[id number]   OSX#[id number]  EXP#9/30/2024

## 2021-05-20 ENCOUNTER — IMMUNIZATIONS (OUTPATIENT)
Dept: FAMILY MEDICINE CLINIC | Facility: HOSPITAL | Age: 46
End: 2021-05-20

## 2021-05-20 DIAGNOSIS — Z23 ENCOUNTER FOR IMMUNIZATION: Primary | ICD-10-CM

## 2021-05-20 PROCEDURE — 91301 SARS-COV-2 / COVID-19 MRNA VACCINE (MODERNA) 100 MCG: CPT

## 2021-05-20 PROCEDURE — 0012A SARS-COV-2 / COVID-19 MRNA VACCINE (MODERNA) 100 MCG: CPT

## 2021-06-30 ENCOUNTER — OFFICE VISIT (OUTPATIENT)
Dept: PSYCHIATRY | Facility: CLINIC | Age: 46
End: 2021-06-30
Payer: COMMERCIAL

## 2021-06-30 DIAGNOSIS — F41.1 GENERALIZED ANXIETY DISORDER WITH PANIC ATTACKS: Primary | ICD-10-CM

## 2021-06-30 DIAGNOSIS — F41.0 GENERALIZED ANXIETY DISORDER WITH PANIC ATTACKS: Primary | ICD-10-CM

## 2021-06-30 DIAGNOSIS — F11.20 OPIATE DEPENDENCE, CONTINUOUS (HCC): ICD-10-CM

## 2021-06-30 PROCEDURE — 90792 PSYCH DIAG EVAL W/MED SRVCS: CPT | Performed by: STUDENT IN AN ORGANIZED HEALTH CARE EDUCATION/TRAINING PROGRAM

## 2021-06-30 RX ORDER — DESVENLAFAXINE 50 MG/1
50 TABLET, EXTENDED RELEASE ORAL DAILY
Qty: 30 TABLET | Refills: 1 | Status: SHIPPED | OUTPATIENT
Start: 2021-06-30 | End: 2021-11-08

## 2021-06-30 RX ORDER — ALPRAZOLAM 0.5 MG/1
0.5 TABLET ORAL
Qty: 90 TABLET | Refills: 0 | Status: SHIPPED | OUTPATIENT
Start: 2021-06-30 | End: 2021-07-07

## 2021-06-30 RX ORDER — ALPRAZOLAM 2 MG/1
2 TABLET ORAL
Qty: 30 TABLET | Refills: 0 | Status: SHIPPED | OUTPATIENT
Start: 2021-06-30 | End: 2021-07-07

## 2021-06-30 RX ORDER — NALOXONE HYDROCHLORIDE 4 MG/.1ML
SPRAY NASAL
Qty: 1 EACH | Refills: 1 | Status: SHIPPED | OUTPATIENT
Start: 2021-06-30 | End: 2021-08-23

## 2021-06-30 NOTE — BH TREATMENT PLAN
TREATMENT PLAN (Medication Management Only)        [id number]    Name and Date of Birth:  Matt Milner 40 y o  1975  Date of Treatment Plan: June 30, 2021  Diagnosis/Diagnoses:    1  Generalized anxiety disorder with panic attacks    2  Opiate dependence, continuous (HCC)      Strengths/Personal Resources for Self-Care: supportive family, supportive friends, taking medications as prescribed, ability to adapt to life changes, ability to communicate needs, ability to communicate well, ability to listen, ability to reason, family ties, good understanding of illness, independence, motivation for treatment, ability to negotiate basic needs, [employer] affiliation, being resoureceful, self-reliance, sense of humor, special hobby/interest, willingness to work on problems  Area/Areas of need (in own words): anxiety symptoms, opiate dependence, tapering off Xanax  1  Long Term Goal: improve control of anxiety  Target Date:6 months - 12/30/2021  Person/Persons responsible for completion of goal: Shereen  2  Short Term Objective (s) - How will we reach this goal?:   A  Provider new recommended medication/dosage changes and/or continue medication(s): continue current medications as prescribed  B  Attend medication management appointments regularly  C  Take psychiatric medications responsibly  D  Maintain sobriety   E  Continue to prioritize spirituality and deb  F  Travel to [place] with family by August 2021   Target Date:6 months - 12/30/2021  Person/Persons Responsible for Completion of Goal: Shereen  Progress Towards Goals: starting treatment  Treatment Modality: medication management every 2 months  Review due 180 days from date of this plan: 6 months - 12/30/2021  Expected length of service: ongoing treatment  My Physician/PA/NP and I have developed this plan together and I agree to work on the goals and objectives   I understand the treatment goals that were developed for my treatment  Treatment Plan completed with assistance and input from patient and verbal consent provided  Treatment plan was not signed at time of office visit secondary to COVID-19 social distancing guidelines

## 2021-06-30 NOTE — PSYCH
56 [address] Maloney    Name and Date of Birth:  Ricci Pritchard 40 y o  1975 MRN: [id number]    Date of Visit: June 30, 2021    Reason for visit: Full psychiatric intake assessment for medication management     HPI     Ricci Pritchard is a 40 y o  female with a past psychiatric history significant for generalized anxiety disorder with panic attacks, opiate dependence (Rx Zubsolv [facility]) and extensive history of polysubstance abuse (cannabis, hallucinogens, cocaine) who presents to the [address] [place] [address] E outpatient clinic for intake assessment  Jacob Bowers presents as anxious yet pleasant and cooperative  Her thoughts are organized, linear, and goal-directed  She completes assessment without difficulty  Jacob Bowers endorses a longstanding history of anxiety and episodic depression that served as the catalyst for her substance abuse  She states that her biological parents abused illicit substances and thus, they were emotionally neglectful and inconsistent  At the age of 16, Jacob Bowers began using cannabis which ultimately lead to use of numerous illicit substances  Shereen reports placement in 10+ detox/rehabilitation programs throughout the course of her life  Her first rehabilitation program was at age 17 in [place]  She last attended [facility], following the death of her father and and subsequent inpatient admission at [facility]  She endorses sobriety from opiates and other illicit substances for the last 3+ years  She was recently psychiatrically managed via CRNP Mrs Renny Christopher through [facility]  However, given their change in policy regarding concurrent use of opiates and benzodiazepines, Jacob Bowers was transferred to [facility] for safer, more appropriate tapering process  Jacob Bowers has been treated with benzodiazepines since her early 20's   She was previously on Xanax 6mg Daily (2mg TID) but was recently tapered to 4mg Daily (2mg AM, 1mg afternoon, 1mg QHS) over the last 6 months  She presents to the clinic today on that current dose  PDMP webistwilliam reviewed, no concerns for abuse or misuse  I spoke at length with Lanie Bae about 's policy regarding concurrent use of opiates and benzodiazepines and risks/alternatives to treatment  She voiced understanding regarding the need to taper off Xanax and was agreeable to do so  I also discussed possibility of faster tapering process via detox program at [facility], to which she was resistant given responsibility of caring for her family  Acutely, Lanie Bae denies most neurovegetative symptomatology suggestive of major depressive disorder  Lanie Bae denies fragmented or non-restorative sleep  Her sleep is adequate  Shereen endorses erratic appetite, yet baseline energy, and no impairment of motivation  Shereen reports adequate concentration/memory and denies new-onset forgetfulness or inattentiveness  Lanie Bae does not experience daily crying spells or limited pleasure in activities previously found pleasurable  Lanie Bae adamantly denies acute thoughts of suicide or self-harm  Lanie Bae has no plans to harm others  There is a documented history of prior suicidal gestures/suicidal attempts  Lanie Bae admits to overdosing and "cutting her wrists" in the past  She has not harmed herself or attempted to end her life in 4+ years  Lanie Bae denies recent non-suicidal self injurious behavior  Lanie Bae is future-oriented and demonstrates self preservation as evidenced by today's evaluation in which Lanie Bae is seeking psychiatric intervention to improve overall mental health and outlook on life  She lists her , 2 children, and her new committment to deb and spirituality as protective factors  Lanie Bae denies a pervasive history of hopelessness or guilt  PHQ-9 score obtained during today's visit was 4       Lanie Bae endorses a historical struggle with anxiety and symptomatology consistent with generalized anxiety disorder  Acutely, Maryse Davidson continues to report anxiety that is pathologic in nature  Shereen endorses excessive nervousness, irrational worry, and overt anxiousness  Maryse Davidson is not pervasively restless or tense but does report feeling episodically keyed-up and on-edge  Shereen experiences disruption in energy and concentration secondary to anxiety  There is evidence to suggest that Shereen experiences irritability, inability to relax, and disruption in sleep in the past, secondary to pathologic anxiety  Maryse Davidson denies new-onset panic symptomatology but does report a history of panic attacks characterized by tremor, SOB, fear of impending doom, and overt anxiousness  She denies maladaptive behaviors  Throughout today's session, Maryse Davidson does appear visibly perturbed  PARIS-7 score obtained during today's visit was 11  Maryse Davidson vehemently denies any acute or chronic history suggestive of an underlying affective (bipolar) organization  Maryse Davidson denies previous episodes of elevated/expansive mood, lengthy periods without sleep, grandiosity, or intense and prolonged irritability  Maryse Davidson denies atypical periods of increased goal-directed behavior, excessive spending, or sexual promiscuity  The patient has no history of pathologic impulsivity or extreme mood lability  During today's evaluation, Maryse Davidson does not exhibit objective evidence of hypomania/j carlos  Maryse Davidson is mostly organized in thought without flight of ideas or loosening of associations  Speech does not appear to be pressured or rapid and Shereen responds well to verbal redirecting  Maryse Davidson denies historical symptomatology suggestive of an underlying psychotic process  Maryse Davidson does not currently endorse acute perceptual disturbances such as A/V hallucinations, paranoia, referential ideation, or delusions  Maryse Davidson denies acute and chronic Schneiderian symptoms, including: thought-broadcasting, thought-insertion, thought-withdrawal or audible thoughts   During today's evaluation, Maryse Davidson does not exhibit objective evidence of vickie psychosis as the patient does not appear internally preoccupied or easily distracted  Shereen's thoughts are organized, linear, and reality-based  Crow Villatoro denies historical symptomatology suggestive of PTSD, OCD, or disordered eating  Current Rating Scores:     Current PHQ-9   PHQ-9 Score (since 5/30/2021)     PHQ-9 Score  4        Current PARIS-7 is 11      Psychiatric Review Of Systems:    Sleep changes: no  Appetite changes: yes  Weight changes: no  Energy/anergy: no  Interest/pleasure/anhedonia: no  Somatic symptoms: no  Anxiety/panic: yes, panic attacks, worrying, worrying daily  Ashley: no  Guilty/hopeless: no  Self injurious behavior/risky behavior: no  Suicidal ideation: no  Homicidal ideation: no  Auditory hallucinations: no  Visual hallucinations: no  Other hallucinations: no  Delusional thinking: no  Eating disorder history: no  Obsessive/compulsive symptoms: no    Review Of Systems:    Constitutional negative   ENT negative   Cardiovascular negative   Respiratory negative   Gastrointestinal abdominal discomfort   Genitourinary negative   Musculoskeletal negative   Integumentary negative   Neurological negative   Endocrine negative   Other Symptoms none, all other systems are negative       Family Psychiatric History:     Family History   Problem Relation Age of Onset    Cancer Father     Drug abuse Father     Drug abuse Mother          Past Psychiatric History:     Inpatient psychiatric admissions: Numerous past psychiatric admissions - last in 2017 at [facility] for depression, SI in context of drug use  Prior outpatient psychiatric linkage: Numerous psychiatrists in the past - Last linkage with [facility]  Past/current psychotherapy: History of psychotherapy linkage, no current therapists  History of suicidal attempts/gestures: Numerous attempts in the past including overdose and "cutting wrists"  History of violence/aggressive behaviors: Denies  Psychotropic medication trials: Lexapro, Prozac, Zoloft, Paxil, Remeron, Trazodone, Wellbutrin, BuSpar, VPA, Lithium, Risperdal, Seroquel, Gabapentin, Propranolol, Clonidine, Valium, Xanax, Klonopin, Adderall, Ativan, Librium, Provigil   Substance abuse inpatient/outpatient rehabilitation: 10+ rehab/detox admissions - first at the age of 17 in [place] - [facility] in [facility]    Substance Abuse History:    Extensive history of illict substance (cocaine, methamphetamine, cannabis, opiates, hallucinogen), and tobacco abuse  She denies a history of ETOH abuse  No past legal actions yet 1 prior arrest secondary to substance intoxication  The patient denies prior DWIs/DUIs  Extensive history of outpatient/inpatient rehabilitation programs  Michael Arguelles does not exhibit objective evidence of substance withdrawal during today's examination nor does Shereen appear under the influence of any psychoactive substance  Social History:    Developmental: Denies a history of milestone/developmental delay  Denies a history of in-utero exposure to toxins/illicit substances  There is no documented history of IEP or need for special education  Education: college graduate - [employer]  Marital history:   Living arrangement, social support: , has 2 children (daughter and son)  Occupational History: on permanent disability  Access to firearms: Denies direct access to weapons/firearms  Nic Dotson has no history of arrests or violence with a deadly weapon       Traumatic History:     Abuse:none is reported  Other Traumatic Events: Father dying in 2017 was problematic     Past Medical History:    Past Medical History:   Diagnosis Date    Addiction [facility] drug ([facility] [address] )     Anxiety     Arthritis     Depression     Endometriosis     no current issues    Infectious viral hepatitis     history of hep c    Irritable bowel syndrome     [facility]      Past Medical History Pertinent Negatives:   Diagnosis Date Noted    Abnormal Pap smear of cervix 02/20/2018     Past Surgical History:   Procedure Laterality Date    APPENDECTOMY      CHOLECYSTECTOMY      HEMORRHOID SURGERY       Allergies   Allergen Reactions    Ciprofloxacin Anaphylaxis     Interacts with Zubsolv    Codeine Lightheadedness     Reaction Date: 07Jun2011;     Morphine Lightheadedness     Reaction Date: 07Jun2011;     Sulfa Antibiotics Anaphylaxis and Rash    Tramadol Lightheadedness       History Review: The following portions of the patient's history were reviewed and updated as appropriate: allergies, current medications, past family history, past medical history, past social history, past surgical history and problem list     OBJECTIVE:    Vital signs in last 24 hours: There were no vitals filed for this visit      Mental Status Evaluation:    Appearance age appropriate, casually dressed, dressed appropriately, tattooed   Behavior pleasant, cooperative, appears anxious, good eye contact   Speech normal rate, normal volume, normal pitch, fluent, clear   Mood dysphoric, anxious   Affect constricted   Thought Processes organized, logical, goal directed, normal rate of thoughts, normal abstract reasoning   Associations intact associations   Thought Content no overt delusions   Perceptual Disturbances: no auditory hallucinations, no visual hallucinations   Abnormal Thoughts  Risk Potential Suicidal ideation - None  Homicidal ideation - None  Potential for aggression - No   Orientation oriented to person, place, time/date and situation   Memory recent and remote memory grossly intact   Consciousness alert and awake   Attention Span Concentration Span attention span and concentration are age appropriate   Intellect appears to be of average intelligence   Insight intact and good   Judgement intact and good   Muscle Strength and  Gait normal gait and normal balance   Motor Activity no abnormal movements   Language no difficulty naming common objects, no difficulty repeating a phrase   Fund of Knowledge adequate knowledge of current events  adequate fund of knowledge regarding past history  adequate fund of knowledge regarding vocabulary    Pain none   Pain Scale 0       Laboratory Results: I have personally reviewed all pertinent laboratory/tests results    Recent Labs (last 2 months):   No visits with results within 2 Month(s) from this visit     Latest known visit with results is:   Office Visit on 10/07/2020   Component Date Value    SARS-CoV-2  10/07/2020 Not Detected        Suicide/Homicide Risk Assessment:    Risk of Harm to Self:  The following ratings are based on assessment at the time of the interview and review of records  Demographic risk factors include:   Historical Risk Factors include: history of depression, history of anxiety, history of suicidal behaviors, history of suicide attempts, history of substance use, history of traumatic experiences  Recent Specific Risk Factors include: current anxiety symptoms  Protective Factors: no current suicidal ideation, ability to adapt to change, able to manage anger well, access to mental health treatment, being a parent, being , compliant with medications, compliant with mental health treatment, connection to own children, effective coping skills, effective decision-making skills, effective problem solving skills, good health, good self-esteem, having a desire to be alive, having a sense of purpose or meaning in life, healthy fear of risky behaviors and pain, impulse control, medical compliance, no substance use problems, opportunities to contribute to community, opportunities to participate in community, personal beliefs about the meaning and value of life, Judaism beliefs discouraging suicide, responsibilities and duties to others, restricted access to lethal means, stable living environment, sense of importance of health and wellness, sense of personal control, sobriety, supportive family, supportive friends  Weapons: none  The following steps have been taken to ensure weapons are properly secured: not applicable  Based on today's assessment, Amy Watts presents the following risk of harm to self: low    Risk of Harm to Others: The following ratings are based on assessment at the time of the interview and review of records  Demographic Risk Factors include: unemployed  Historical Risk Factors include: none  Recent Specific Risk Factors include: none  Protective Factors: no current homicidal ideation  Weapons: none  The following steps have been taken to ensure weapons are properly secured: not applicable  Based on today's Amarilis Vick presents the following risk of harm to others: none    The following interventions are recommended: no intervention changes needed  Although patient's acute lethality risk is LOW, long-term/chronic lethality risk is elevated given extensive history of polysubstance abuse and numerous suicidal attempts/gestures in the past  However, at the current moment, Amy Watts is future-oriented, forward-thinking, and demonstrates ability to act in a self-preserving manner as evidenced by volitionally presenting to the clinic today, seeking treatment  Additionally, Amy Watts sits throughout the assessment wearing personal protective gear (ie mask) in the context of an ongoing viral pandemic, suggesting a will and desire to live  She vehemently denies any acute SI/HI  She has no attempted to harm herself in 4+ years  She has found God and prioritized her deb/spirituality  She is currently sober and denies abuse of illicit substances  She lists her children, , and God as main protective factors  At this juncture, inpatient hospitalization is not currently warranted   To mitigate future risk, patient should adhere to treatment recommendations, avoid alcohol/illicit substance use, utilize community-based resources and familiar support, and prioritize mental health treatment  Assessment/Plan:     Abdoul Silverio is a 40 y o  female with a past psychiatric history significant for generalized anxiety disorder with panic attacks, opiate dependence (Rx [facility]v [facility]) and extensive history of polysubstance abuse (cannabis, hallucinogens, cocaine) who presents to the [address] [place] [address] E outpatient clinic for intake assessment  Matt Savage endorses a longstanding history of anxiety and episodic depression that served as the catalyst for her substance abuse  She states that her biological parents abused illicit substances and thus, they were emotionally neglectful and inconsistent  At the age of 16, Matt Savage began using cannabis which ultimately lead to use of numerous illicit substances  Shereen reports placement in 10+ detox/rehabilitation programs throughout the course of her life  Her first rehabilitation program was at age 17 in [facility]  She last attended [facility] in 2017, following the death of her father and and subsequent inpatient admission at [facility]  She endorses sobriety from opiates and other illicit substances for the last 3+ years  She was recently psychiatrically managed via CRNP Mrs Alexandria Escudero through [facility]  However, given their change in policy regarding concurrent use of opiates and benzodiazepines, Matt Savage was transferred to [facility] for safer, more appropriate tapering process  Matt Savage has been treated with benzodiazepines since her early 20's  She was previously on Xanax 6mg Daily (2mg TID) but was recently tapered to 4mg Daily (2mg AM, 1mg afternoon, 1mg QHS) over the last 6 months  She presents to the clinic today on that current dose  PDMP nena reviewed, no concerns for abuse or misuse  I spoke at length with Matt Savage about 's policy regarding concurrent use of opiates and benzodiazepines and risks/alternatives to treatment   She voiced understanding regarding the need to taper off Xanax and was agreeable to do so  I also discussed possibility of faster tapering process via detox program at [facility], to which she was resistant given responsibility of caring for her family  Acutely, Aydin Hernandez denies most neurovegetative symptomatology suggestive of major depressive disorder  Aydin Hernandez adamantly denies acute thoughts of suicide or self-harm  Aydin Hernandez has no plans to harm others  There is a documented history of prior suicidal gestures/suicidal attempts  Aydin Hernandez admits to overdosing and "cutting her wrists" in the past  She has not harmed herself or attempted to end her life in 4+ years  PHQ-9 score obtained during today's visit was 4  Aydin Hernandez endorses a historical struggle with anxiety and symptomatology consistent with generalized anxiety disorder  Acutely, Aydin Hernandez continues to report anxiety that is pathologic in nature  Shereen endorses excessive nervousness, irrational worry, and overt anxiousness  Aydin Hernandez is not pervasively restless or tense but does report feeling episodically keyed-up and on-edge  Shereen experiences disruption in energy and concentration secondary to anxiety  There is evidence to suggest that Shereen experiences irritability, inability to relax, and disruption in sleep in the past, secondary to pathologic anxiety  Aydin Hernandez denies new-onset panic symptomatology but does report a history of panic attacks characterized by tremor, SOB, fear of impending doom, and overt anxiousness  She denies maladaptive behaviors  Throughout today's session, Aydin Hernandez does appear visibly perturbed  PARIS-7 score obtained during today's visit was 11  Aydin Hernandez vehemently denies any acute or chronic history suggestive of an underlying affective (bipolar) organization  Aydin eHrnandez denies historical symptomatology suggestive of an underlying psychotic process  Aydin Hernandez denies historical symptomatology suggestive of PTSD, OCD, or disordered eating       Psychopharmacologically, I spoke at length with Aydin Hernandez about the importance of managing her anxiety with baseline medication (ie SNRI, SSRI, etc ) rather than daily use of controlled medications like Xanax  She voiced understanding  I also spoke at length about the risks of concurrent use of benzodiazepines and opiates (as she is Rx Subobxone) as this increases risk for respiratory suppression  After lengthy discussion, Matt Savage was agreeable to initiate tapering process of Xanax  She has been treated with BZ's over 20+ years and this tapering process will likely take 12+ months as plan is to mitigate withdrawal and prevent seizures  Will decrease Xanax 2mg BID to Xanax 2mg AM, 1mg afternoon, and 0 5mg QHS (total reduction of 0 5mg Xanax)  Will also initiate treatment with Pristiq 50mg Daily for management of PARIS, as she has yet to trial an SNRI medication  Risks/benefits/alternativies to treatment discussed, including a myriad of potential adverse medication side effects, to which Shereen voiced understanding and consented fully to treatment           DSM-V Diagnoses:     1 ) Generalized Anxiety Disorder with Panic Attacks   2 ) Opiate dependence, continuous  3 ) Polysubstance abuse by history       Treatment Recommendations/Precautions:    1 ) Generalized Anxiety Disorder with Panic Attacks   - Start Pristiq 50mg Daily  - Taper Xanax 2mg BID to 2mg AM, 1mg afternoon, and 0 5mg PM   - Plan is to ultimately taper off Xanax given concurrent use of Suboxone, however, Matt Savage has been on BZ for 20+ years so tapering process will be arduous   - Psychoeducation provided regarding the importance of exercise and healthy dietary choices and their impact on mood, energy, and motivation  - Counseled to avoid ETOH, illict substances, and nicotine secondary to the detrimental effects of these substances on mental and physical health  - Encouraged to engage in non-verbal forms of therapy such as art therapy, music therapy, and mindfulness  - Discussed the bio-psycho-social model to treatment and therapeutic exercises/interventions were attempted to cognitively restructure thoughts    2 ) Opiate dependence, continuous  - [facility]v 8 [facility]      3 ) Polysubstance abuse by history   - Currently sober  - Counseled to avoid ETOH, illict substances, and nicotine secondary to the detrimental effects of these substances on mental and physical health        Medication management every 2 months  Aware of need to follow up with family physician for medical issues  Aware of 24 hour and weekend coverage for urgent situations accessed by calling [facility] main practice number    Medications Risks/Benefits:      Risks, Benefits And Possible Side Effects Of Medications:    Risks, benefits, and possible side effects of medications explained to [facility] including risk of suicidality and serotonin syndrome related to treatment with antidepressants and risks of misuse, abuse or dependence, sedation and respiratory depression related to treatment with benzodiazepine medications  She verbalizes understanding and agreement for treatment  Controlled Medication Discussion:     [facility] has been filling controlled prescriptions on time as prescribed according to [facility]  Discussed with [facility] the risks of sedation, respiratory depression, impairment of ability to drive and potential for abuse and addiction related to treatment with benzodiazepine medications  She understands risk of treatment with benzodiazepine medications, agrees to not drive if feels impaired and agrees to take medications as prescribed  Discussed with [facility] warning on concurrent use of benzodiazepines and opioid medications including sedation, respiratory depression, coma and death   She understands the risk of treatment with benzodiazepines in addition to opioids and wants to continue taking those medications  Discussed with [facility] need to slowly taper off benzodiazepines as recommended by [place] [facility], due to concurrent use of opioid medications and increased risk of sedation, respiratory depression, coma and death with that combination    Treatment Plan:    Completed and signed during the session: Yes - Treatment Plan done but not signed at time of office visit due to:  Plan reviewed in person and verbal consent given due to [place] social distancing      Note Share Disclaimer:      This note was not shared with the patient due to reasonable likelihood of causing patient harm      Lynne Young MD 06/30/21

## 2021-07-07 ENCOUNTER — TELEPHONE (OUTPATIENT)
Dept: PSYCHIATRY | Facility: CLINIC | Age: 46
End: 2021-07-07

## 2021-07-07 DIAGNOSIS — F41.1 GENERALIZED ANXIETY DISORDER WITH PANIC ATTACKS: Primary | ICD-10-CM

## 2021-07-07 DIAGNOSIS — F41.0 GENERALIZED ANXIETY DISORDER WITH PANIC ATTACKS: Primary | ICD-10-CM

## 2021-07-07 RX ORDER — ALPRAZOLAM 2 MG/1
2 TABLET ORAL DAILY PRN
Qty: 30 TABLET | Refills: 0 | Status: SHIPPED | OUTPATIENT
Start: 2021-07-07 | End: 2021-08-04 | Stop reason: SDUPTHER

## 2021-07-07 NOTE — TELEPHONE ENCOUNTER
Alondra Shore from [facility] called with some questions regarding the scripts they are fillig and the instructions on the rx  I read them and I'm unsure what the instructions mean as well  I will forward to the nurses to call back   [phone]

## 2021-07-07 NOTE — TELEPHONE ENCOUNTER
Spoke with Kaia Luevano at [facility]  Verified prescription received and instructions were understood  Thank you!

## 2021-07-07 NOTE — TELEPHONE ENCOUNTER
Returned call to Noy  She is requesting clarification of alprazolam dosing  Part of the directions are cut off in the received prescription  Dr Grey Christian, please review prescription for 2 mg  The Sig notes 2 mg at HS if needed and also 2 mg in AM     Will follow up with Noy after review

## 2021-07-28 ENCOUNTER — TELEPHONE (OUTPATIENT)
Dept: OBGYN CLINIC | Facility: CLINIC | Age: 46
End: 2021-07-28

## 2021-07-28 DIAGNOSIS — Z30.42 ENCOUNTER FOR SURVEILLANCE OF INJECTABLE CONTRACEPTIVE: ICD-10-CM

## 2021-07-28 RX ORDER — MEDROXYPROGESTERONE ACETATE 150 MG/ML
150 INJECTION, SUSPENSION INTRAMUSCULAR
Qty: 1 ML | Refills: 5 | Status: SHIPPED | OUTPATIENT
Start: 2021-07-28

## 2021-07-28 RX ORDER — MEDROXYPROGESTERONE ACETATE 150 MG/ML
INJECTION, SUSPENSION INTRAMUSCULAR
Qty: 1 ML | Refills: 0 | Status: SHIPPED | OUTPATIENT
Start: 2021-07-28 | End: 2021-11-08

## 2021-08-02 ENCOUNTER — CLINICAL SUPPORT (OUTPATIENT)
Dept: OBGYN CLINIC | Facility: CLINIC | Age: 46
End: 2021-08-02

## 2021-08-02 VITALS
HEART RATE: 80 BPM | SYSTOLIC BLOOD PRESSURE: 125 MMHG | DIASTOLIC BLOOD PRESSURE: 81 MMHG | WEIGHT: 190 LBS | BODY MASS INDEX: 31.62 KG/M2

## 2021-08-02 DIAGNOSIS — Z30.42 ENCOUNTER FOR SURVEILLANCE OF INJECTABLE CONTRACEPTIVE: Primary | ICD-10-CM

## 2021-08-02 PROCEDURE — 96372 THER/PROPH/DIAG INJ SC/IM: CPT

## 2021-08-02 RX ORDER — MEDROXYPROGESTERONE ACETATE 150 MG/ML
150 INJECTION, SUSPENSION INTRAMUSCULAR ONCE
Status: COMPLETED | OUTPATIENT
Start: 2021-08-02 | End: 2021-08-02

## 2021-08-02 RX ADMIN — MEDROXYPROGESTERONE ACETATE 150 MG: 150 INJECTION, SUSPENSION INTRAMUSCULAR at 10:40

## 2021-08-04 ENCOUNTER — TELEPHONE (OUTPATIENT)
Dept: PSYCHIATRY | Facility: CLINIC | Age: 46
End: 2021-08-04

## 2021-08-04 DIAGNOSIS — F41.0 GENERALIZED ANXIETY DISORDER WITH PANIC ATTACKS: ICD-10-CM

## 2021-08-04 DIAGNOSIS — F41.1 GENERALIZED ANXIETY DISORDER WITH PANIC ATTACKS: ICD-10-CM

## 2021-08-04 RX ORDER — ALPRAZOLAM 2 MG/1
2 TABLET ORAL DAILY PRN
Qty: 30 TABLET | Refills: 0 | Status: SHIPPED | OUTPATIENT
Start: 2021-08-04 | End: 2021-08-31

## 2021-08-04 RX ORDER — ALPRAZOLAM 0.5 MG/1
TABLET ORAL
Qty: 90 TABLET | Refills: 0 | Status: SHIPPED | OUTPATIENT
Start: 2021-08-04 | End: 2021-08-31

## 2021-08-04 NOTE — TELEPHONE ENCOUNTER
PDMP website reviewed  Navya Galeas has been appropriately adherent to controlled psychotropic medications without evidence of abuse or misuse  As such, will send 30-day refill to pharmacy of choice and follow up as necessary

## 2021-08-05 ENCOUNTER — TELEPHONE (OUTPATIENT)
Dept: PSYCHIATRY | Facility: CLINIC | Age: 46
End: 2021-08-05

## 2021-08-05 NOTE — TELEPHONE ENCOUNTER
Initiated and sent P  A  request for Alprazolam 0 5 mg  Via fax to [facility]  Spoke with Kimberlyn Nash and made aware  She is familiar with the process

## 2021-08-05 NOTE — TELEPHONE ENCOUNTER
Initiated and sent P  A  request santo Alprazolam 2 mg via fax to [place]  Spoke with Radha Terrazas  She is aware and aware of process       Will await outcome

## 2021-08-06 NOTE — TELEPHONE ENCOUNTER
Received fax from [facility] notifying of APPROVAL of Alprazolam 0 5 mg effective 8/6/21-8/6/22    Pharmacy and [facility] aware

## 2021-08-06 NOTE — TELEPHONE ENCOUNTER
Called [facility] to follow up on Status of Alprazolam 2 mg as we received approval for the 0 5 mg  Approved 8/6/21-8/6/22 Per [facility]       Pharmacy and [facility] aware

## 2021-08-16 ENCOUNTER — TELEPHONE (OUTPATIENT)
Dept: FAMILY MEDICINE CLINIC | Facility: CLINIC | Age: 46
End: 2021-08-16

## 2021-08-16 DIAGNOSIS — Z20.822 COVID-19 RULED OUT: Primary | ICD-10-CM

## 2021-08-16 NOTE — TELEPHONE ENCOUNTER
I see That she was seen in ER [facility] for Abscess Tooth ???  She should do Covid PCR STAT  Stay Home bed rest,    Did they give her Antibiotics ? ?

## 2021-08-31 ENCOUNTER — OFFICE VISIT (OUTPATIENT)
Dept: PSYCHIATRY | Facility: CLINIC | Age: 46
End: 2021-08-31
Payer: COMMERCIAL

## 2021-08-31 DIAGNOSIS — F51.01 PRIMARY INSOMNIA: ICD-10-CM

## 2021-08-31 DIAGNOSIS — F11.20 OPIATE DEPENDENCE, CONTINUOUS (HCC): ICD-10-CM

## 2021-08-31 DIAGNOSIS — F41.0 GENERALIZED ANXIETY DISORDER WITH PANIC ATTACKS: Primary | ICD-10-CM

## 2021-08-31 DIAGNOSIS — F41.0 PANIC ATTACKS: ICD-10-CM

## 2021-08-31 DIAGNOSIS — F41.1 GENERALIZED ANXIETY DISORDER WITH PANIC ATTACKS: Primary | ICD-10-CM

## 2021-08-31 PROCEDURE — 99213 OFFICE O/P EST LOW 20 MIN: CPT | Performed by: STUDENT IN AN ORGANIZED HEALTH CARE EDUCATION/TRAINING PROGRAM

## 2021-08-31 RX ORDER — ALPRAZOLAM 1 MG/1
1 TABLET ORAL 3 TIMES DAILY PRN
Qty: 90 TABLET | Refills: 0 | Status: SHIPPED | OUTPATIENT
Start: 2021-08-31 | End: 2021-09-27 | Stop reason: SDUPTHER

## 2021-08-31 RX ORDER — PROMETHAZINE HYDROCHLORIDE 25 MG/1
25 TABLET ORAL
Qty: 30 TABLET | Refills: 2 | Status: SHIPPED | OUTPATIENT
Start: 2021-08-31 | End: 2021-09-28 | Stop reason: SDUPTHER

## 2021-08-31 NOTE — PSYCH
MEDICATION MANAGEMENT NOTE        [facility]      Name and Date of Birth:  Yasir Rendon 40 y o  1975 MRN: [id number]    Date of Visit: August 31, 2021    Reason for Visit: Follow-up visit for medication management     SUBJECTIVE:    Yasir Rendon is a 40 y o  female with past psychiatric history significant for generalized anxiety disorder with panic attacks, opiate dependence (Rx Zubsolv via [facility]) and extensive history of polysubstance abuse (cannabis, hallucinogens, cocaine) who was personally seen and evaluated today at the [address] [place] [address] E outpatient clinic for follow-up and medication management  Danya Boxer presents as calm, pleasant, and cooperative  Her thoughts are linear and reality-based  She completes assessment without difficulty  Lats session, Shereen's Xanax was tapered, given concurrent use of prescribed opiates (Zubsolv via [facility])  She tolerated this change surprisingly well and denies rebound anxiety or feeling overtly perturbed  Today, she presents to the clinic endorses mostly mood stability with episodic periods of anxiety  During intake visit, she was also started on Pristiq for baseline management of mood and pathologic anxiety  Unfortunately, she did not tolerate this medication well, and endorsed daily GI discomfort and excessive fatigue  She stopped taking the medication after 5 days  She did not call the clinic prior to discontinuation and psychoeducation was provided regarding this  She was receptive  Danya Boxer was agreeable to re-start the medication but move dosing to evening (QHS) to mitigate risk of daytime fatigue  Acutely, Shereen endorses adequate sleep with use of Promethazine  This was previously RX by her former psychiatrist and thus, she requests that the medication is continued today  Shereen's appetite is satisfactory  She reports improvement in energy and mood since stopping AM Pristiq   She denies daily crying spells or anhedonia  She continues to find pleasure in spending time with her children, although she voices frustration given that both of her daughters struggle immensely with ETOH abuse  Supportive therapy provided  Jordan Hand vehemently denies lethality concern  She is without SI/HI  She remains future-oriented, detailing plans to celebrate her sobriety anniversary in September  She plans to go out to dinner and travel to the [place] [address]  Jordan Hand is committed to ongoing substance abuse treatment  She denies ETOH or illicit substance abuse  She was agreeable to continue tapering process of Xanax  Jordan Hand continues to endorse daily worry, nervousness, and feeling on-edge  She is not hopeless nor does she endorse excessive guilt or despair  Jordan Hand is without any concerning symptomatology suggestive of j carlos/hypomania  She is not overtly psychotic on examination  She offers no further concerns  Current Rating Scores:     None completed today  Review Of Systems:      Constitutional feeling tired, low energy and as noted in HPI   ENT negative   Cardiovascular negative   Respiratory negative   Gastrointestinal abdominal discomfort and constipation   Genitourinary negative   Musculoskeletal negative   Integumentary negative   Neurological negative   Endocrine negative   Other Symptoms none, all other systems are negative       Past Psychiatric History: (unchanged information from previous note copied and italicized) - Information that is bolded has been updated       Inpatient psychiatric admissions: Numerous past psychiatric admissions - last in 2017 at [facility] for depression, SI in context of drug use  Prior outpatient psychiatric linkage: Numerous psychiatrists in the past - Last linkage with [facility] via [facility]  Past/current psychotherapy: History of psychotherapy linkage, no current therapists  History of suicidal attempts/gestures: Numerous attempts in the past including overdose and "cutting wrists"  History of violence/aggressive behaviors: Denies  Psychotropic medication trials: Lexapro, Prozac, Zoloft, Paxil, Remeron, Trazodone, Wellbutrin, BuSpar, VPA, Lithium, Risperdal, Seroquel, Gabapentin, Propranolol, Clonidine, Valium, Xanax, Klonopin, Adderall, Ativan, Librium, Provigil, Pristiq (now)   Substance abuse inpatient/outpatient rehabilitation: 10+ rehab/detox admissions - first at the age of 17 in [facility] - last in 2017 [facility]     Substance Abuse History: (unchanged information from previous note copied and italicized) - Information that is bolded has been updated      Extensive history of illict substance (cocaine, methamphetamine, cannabis, opiates, hallucinogen), and tobacco abuse  She denies a history of ETOH abuse  No past legal actions yet 1 prior arrest secondary to substance intoxication  The patient denies prior DWIs/DUIs  Extensive history of outpatient/inpatient rehabilitation programs  Navya Galeas does not exhibit objective evidence of substance withdrawal during today's examination nor does Shereen appear under the influence of any psychoactive substance           Social History: (unchanged information from previous note copied and italicized) - Information that is bolded has been updated       Developmental: Denies a history of milestone/developmental delay  Denies a history of in-utero exposure to toxins/illicit substances  There is no documented history of IEP or need for special education  Education: college graduate - [employer]  Marital history:   Living arrangement, social support: , has 2 children (daughter and son)  Occupational History: on permanent disability  Access to firearms: Denies direct access to weapons/firearms  Paralee Vida has no history of arrests or violence with a deadly weapon       Traumatic History: (unchanged information from previous note copied and italicized) - Information that is bolded has been updated     Abuse:none is reported  Other Traumatic Events: Father dying in 2017 was problematic         Past Medical History:    Past Medical History:   Diagnosis Date    Addiction to drug ([facility] )     Anxiety     Arthritis     Depression     Endometriosis     no current issues    Infectious viral hepatitis     history of hep c    Irritable bowel syndrome     Substance abuse ([place] [address] )      Past Medical History Pertinent Negatives:   Diagnosis Date Noted    Abnormal Pap smear of cervix 2018     Past Surgical History:   Procedure Laterality Date    APPENDECTOMY      CHOLECYSTECTOMY      HEMORRHOID SURGERY       Allergies   Allergen Reactions    Ciprofloxacin Anaphylaxis     Interacts with Zubsolv    Codeine Lightheadedness     Reaction Date: 2011;     Morphine Lightheadedness     Reaction Date: 2011;     Sulfa Antibiotics Anaphylaxis and Rash    Tramadol Lightheadedness       Substance Abuse History:    Social History     Substance and Sexual Activity   Alcohol Use No     Social History     Substance and Sexual Activity   Drug Use Not Currently       Social History:    Social History     Socioeconomic History    Marital status: /Civil Union     Spouse name: Lucius    Number of children: 2    Years of education: Not on file    Highest education level: Not on file   Occupational History    Occupation: homemaker   Tobacco Use    Smoking status: Former Smoker     Quit date: 3/14/2019     Years since quittin 4    Smokeless tobacco: Never Used   Vaping Use    Vaping Use: Never used   Substance and Sexual Activity    Alcohol use: No    Drug use: Not Currently    Sexual activity: Yes     Partners: Male     Birth control/protection: Injection   Other Topics Concern    Not on file   Social History Narrative    Not on file     Social Determinants of Health     Financial Resource Strain: Low Risk     Difficulty of Paying Living Expenses: Not hard at all   Food Insecurity: No Food Insecurity    Worried About Running Out of Food in the Last Year: Never true    Arnel of Food in the Last Year: Never true   Transportation Needs: No Transportation Needs    Lack of Transportation (Medical): No    Lack of Transportation (Non-Medical): No   Physical Activity:     Days of Exercise per Week:     Minutes of Exercise per Session:    Stress:     Feeling of Stress :    Social Connections:     Frequency of Communication with Friends and Family:     Frequency of Social Gatherings with Friends and Family:     Attends Rastafarian Services:     Active Member of Clubs or Organizations:     Attends Club or Organization Meetings:     Marital Status:    Intimate Partner Violence:     Fear of Current or Ex-Partner:     Emotionally Abused:     Physically Abused:     Sexually Abused:        Family Psychiatric History:     Family History   Problem Relation Age of Onset    Cancer Father     Drug abuse Father     Drug abuse Mother        History Review: The following portions of the patient's history were reviewed and updated as appropriate: allergies, current medications, past family history, past medical history, past social history, past surgical history and problem list          OBJECTIVE:     Vital signs in last 24 hours: There were no vitals filed for this visit      Mental Status Evaluation:    Appearance disheveled, looks older than stated age, piercings, tattooed   Behavior pleasant, cooperative, mildly anxious, good eye contact   Speech normal rate, normal volume, normal pitch, fluent   Mood dysphoric, anxious   Affect constricted   Thought Processes organized, logical, goal directed   Associations intact associations   Thought Content no overt delusions   Perceptual Disturbances: no auditory hallucinations, no visual hallucinations   Abnormal Thoughts  Risk Potential Suicidal ideation - None at present  Homicidal ideation - None at present  Potential for aggression - No   Orientation oriented to person, place, time/date and situation   Memory recent and remote memory grossly intact   Consciousness alert and awake   Attention Span Concentration Span attention span and concentration are age appropriate   Intellect appears to be of average intelligence   Insight fair   Judgement intact and good   Muscle Strength and  Gait normal gait and normal balance   Motor activity no abnormal movements   Language no difficulty naming common objects, no difficulty repeating a phrase   Fund of Knowledge adequate knowledge of current events  adequate fund of knowledge regarding past history  adequate fund of knowledge regarding vocabulary    Pain none   Pain Scale 0       Laboratory Results: I have personally reviewed all pertinent laboratory/tests results    Recent Labs (last 2 months):   No visits with results within 2 Month(s) from this visit  Latest known visit with results is:   Office Visit on 10/07/2020   Component Date Value    SARS-CoV-2  10/07/2020 Not Detected        Suicide/Homicide Risk Assessment:    The following interventions are recommended: no intervention changes needed      Lethality Statement:    Based on today's assessment and clinical criteria, Nicki Lanier contracts for safety and is not an imminent risk of harm to self or others  Outpatient level of care is deemed appropriate at this current time  Yun Recinos understands that if they can no longer contract for safety, they need to call the office or report to their nearest Emergency Room for immediate evaluation  Assessment/Plan:      Nicki Lanier is a 40 y o  female with past psychiatric history significant for generalized anxiety disorder with panic attacks, opiate dependence (Rx Zubsolv via [facility]) and extensive history of polysubstance abuse (cannabis, hallucinogens, cocaine) who was personally seen and evaluated today at the [address] [place] [address] [address] outpatient clinic for follow-up and medication management   Yun Recinos endorses a longstanding history of anxiety and episodic depression that served as the catalyst for her substance abuse  She states that her biological parents abused illicit substances and thus, they were emotionally neglectful and inconsistent  At the age of 16, Harlan Trent began using cannabis which ultimately lead to use of numerous illicit substances  Shereen reports placement in 10+ detox/rehabilitation programs throughout the course of her life  Her first rehabilitation program was at age 17 in [facility]  She last attended rehab via [facility] in 2017, following the death of her father and and subsequent inpatient admission at [facility]  She endorses sobriety from opiates and other illicit substances for the last 3+ years  She was recently psychiatrically managed via CRNP Mrs Young Moizderek through [facility]  However, given their change in policy regarding concurrent use of opiates and benzodiazepines, Harlan Trent was transferred to [facility] for safer, more appropriate tapering process  Harlan Trent has been treated with benzodiazepines since her early 20's  She was previously on Xanax 6mg Daily (2mg TID) but was recently tapered to 4mg Daily (2mg AM, 1mg afternoon, 1mg QHS) over the last 8 months  PDMP webiste reviewed, no concerns for abuse or misuse  I spoke at length with Harlan Trent about 's policy regarding concurrent use of opiates and benzodiazepines and risks/alternatives to treatment  She voiced understanding regarding the need to taper off Xanax and was agreeable to do so  I also discussed possibility of faster tapering process via detox program at [address] [place] [address], to which she was resistant given responsibility of caring for her family  Harlan Trent denies most neurovegetative symptomatology suggestive of major depressive disorder  Harlan Trent adamantly denies acute thoughts of suicide or self-harm  Harlan Trent has no plans to harm others  There is a documented history of prior suicidal gestures/suicidal attempts   Harlan Trent admits to overdosing and "cutting her wrists" in the past  She has not harmed herself or attempted to end her life in 4+ years  Deya Mora endorses a historical struggle with anxiety and symptomatology consistent with generalized anxiety disorder  Deya Mora reports anxiety that is pathologic in nature  Shereen endorses excessive nervousness, irrational worry, and overt anxiousness  Deya Mora is not pervasively restless or tense but does report feeling episodically keyed-up and on-edge  Shereen experiences disruption in energy and concentration secondary to anxiety  There is evidence to suggest that Shereen experiences irritability, inability to relax, and disruption in sleep in the past, secondary to pathologic anxiety  Deya Mora denies new-onset panic symptomatology but does report a history of panic attacks characterized by tremor, SOB, fear of impending doom, and overt anxiousness  She denies maladaptive behaviors  Deya Mora vehemently denies any acute or chronic history suggestive of an underlying affective (bipolar) organization  Deya Mora denies historical symptomatology suggestive of an underlying psychotic process  Deya Mora denies historical symptomatology suggestive of PTSD, OCD, or disordered eating       Psychopharmacologically, Deya Mora is tolerating tapering process off Xanax  Will continue to pursue this process slowly, given that she has been on Xanax for 20+ years  As such, Deya Mora was agreeable to reduction of 0 5mg for new total of 3mg of Xanax per day  Will prescribe this as 1mg TID  Will restart Pristiq 50mg but move to QHS dosing to mitigate daytime fatigue   Risks/benefits/alternativies to treatment discussed, including a myriad of potential adverse medication side effects, to which Shereen voiced understanding and consented fully to treatment             DSM-V Diagnoses:      1 ) Generalized Anxiety Disorder with Panic Attacks   2 ) Opiate dependence, continuous  3 ) Polysubstance abuse by history   4 ) Insomnia        Treatment Recommendations/Precautions:     1 ) Generalized Anxiety Disorder with Panic Attacks   - Re-start Pristiq 50mg - change to QHS dosing  - Taper Xanax 2mg AM, 1mg afternoon, and 0 5mg PM to 1mg TID (dose reduction of 0 5mg)              - Plan is to ultimately taper off Xanax given concurrent use of Suboxone, however, Kesley Renteria has been on BZ for 20+ years so tapering process will be arduous   - Psychoeducation provided regarding the importance of exercise and healthy dietary choices and their impact on mood, energy, and motivation  - Counseled to avoid ETOH, illict substances, and nicotine secondary to the detrimental effects of these substances on mental and physical health  - Encouraged to engage in non-verbal forms of therapy such as art therapy, music therapy, and mindfulness  - Discussed the bio-psycho-social model to treatment and therapeutic exercises/interventions were attempted to cognitively restructure thoughts     2 ) Opiate dependence, continuous  - Rx [facility]v 8 [facility]       3 ) Polysubstance abuse by history   - Currently sober  - Counseled to avoid ETOH, illict substances, and nicotine secondary to the detrimental effects of these substances on mental and physical health    4 ) Insomnia  - Continue Promethazine 25mg QHS PRN       Medication management every 3 months  Aware of need to follow up with family physician for medical issues  Aware of 24 hour and weekend coverage for urgent situations accessed by calling [facility] main practice number    Medications Risks/Benefits      Risks, Benefits And Possible Side Effects Of Medications:    Risks, benefits, and possible side effects of medications explained to Kelsey Renteria including risk of suicidality and serotonin syndrome related to treatment with antidepressants and risks of misuse, abuse or dependence, sedation and respiratory depression related to treatment with benzodiazepine medications   She verbalizes understanding and agreement for treatment  Controlled Medication Discussion:     Waldo Lane has been filling controlled prescriptions on time as prescribed according to [facility]  Discussed with Waldo Lane the risks of sedation, respiratory depression, impairment of ability to drive and potential for abuse and addiction related to treatment with benzodiazepine medications  She understands risk of treatment with benzodiazepine medications, agrees to not drive if feels impaired and agrees to take medications as prescribed  Discussed with Waldo Lane need to slowly taper off benzodiazepines as recommended by [place] [facility] Monitoring Program, due to concurrent use of opioid medications and increased risk of sedation, respiratory depression, coma and death with that combination    Psychotherapy Provided:     Individual psychotherapy provided: Yes  Counseling was provided during the session today for 17 minutes  Medication changes discussed with Shereen  Medication education provided to Waldo Lane  Recent stressors discussed with Waldo Lane including family conflict, family issues, health issues, medical problems, recent medication change, everyday stressors and occasional anxiety  Coping strategies including abstaining from substance use, cognitive restructuring, compliance with medications, eliminating avoidance, getting into a good routine, increasing social contact, maintain healthy diet, maintain heathy sleeping hygiene, maintain positive attitude, reducing fatigue, stress reduction, spending time with family and spending time with friends reviewed with Waldo Lane  Educated on importance of medication and treatment compliance  Importance of follow up with family physician for medical issues reviewed with Waldo Lane  Supportive therapy provided  Treatment Plan:    Completed and signed during the session: Not applicable - Treatment Plan not due at this session    Note Share Disclaimer:      This note was not shared with the patient due to reasonable likelihood of causing patient harm    Alex Wilkerson MD 08/31/21

## 2021-09-27 DIAGNOSIS — F41.0 GENERALIZED ANXIETY DISORDER WITH PANIC ATTACKS: ICD-10-CM

## 2021-09-27 DIAGNOSIS — F41.0 PANIC ATTACKS: ICD-10-CM

## 2021-09-27 DIAGNOSIS — F41.1 GENERALIZED ANXIETY DISORDER WITH PANIC ATTACKS: ICD-10-CM

## 2021-09-27 RX ORDER — ALPRAZOLAM 1 MG/1
1 TABLET ORAL 3 TIMES DAILY PRN
Qty: 90 TABLET | Refills: 0 | Status: SHIPPED | OUTPATIENT
Start: 2021-09-27 | End: 2021-10-25 | Stop reason: SDUPTHER

## 2021-09-27 RX ORDER — PROMETHAZINE HYDROCHLORIDE 25 MG/1
25 TABLET ORAL
Qty: 30 TABLET | Refills: 2 | OUTPATIENT
Start: 2021-09-27

## 2021-09-27 NOTE — TELEPHONE ENCOUNTER
PDMP website reviewed  Philip Tatum has been appropriately adherent to controlled psychotropic medications without evidence of abuse or misuse  As such, will send 30-day refill to pharmacy of choice and follow up as necessary

## 2021-09-27 NOTE — TELEPHONE ENCOUNTER
Patient called in for a refill and has a f/u on 11/30  Patient has also stated that she is having some stomach issues with the medication PRISTIQ

## 2021-09-28 ENCOUNTER — TELEPHONE (OUTPATIENT)
Dept: PSYCHIATRY | Facility: CLINIC | Age: 46
End: 2021-09-28

## 2021-09-28 NOTE — TELEPHONE ENCOUNTER
Shereen MARC on nurse line stating that the Promethazine 25 mg prescription not at pharmacy  Spoke to Greg at [facility] stating that they did not receive prescription  Please resend

## 2021-10-04 ENCOUNTER — OFFICE VISIT (OUTPATIENT)
Dept: FAMILY MEDICINE CLINIC | Facility: CLINIC | Age: 46
End: 2021-10-04
Payer: COMMERCIAL

## 2021-10-04 VITALS
HEIGHT: 65 IN | BODY MASS INDEX: 30.99 KG/M2 | DIASTOLIC BLOOD PRESSURE: 84 MMHG | RESPIRATION RATE: 14 BRPM | SYSTOLIC BLOOD PRESSURE: 126 MMHG | HEART RATE: 79 BPM | TEMPERATURE: 97.8 F | OXYGEN SATURATION: 98 % | WEIGHT: 186 LBS

## 2021-10-04 DIAGNOSIS — Z23 NEED FOR INFLUENZA VACCINATION: ICD-10-CM

## 2021-10-04 DIAGNOSIS — Z12.31 SCREENING MAMMOGRAM FOR BREAST CANCER: ICD-10-CM

## 2021-10-04 DIAGNOSIS — Z00.01 ENCOUNTER FOR GENERAL ADULT MEDICAL EXAMINATION WITH ABNORMAL FINDINGS: Primary | ICD-10-CM

## 2021-10-04 DIAGNOSIS — K59.04 CHRONIC IDIOPATHIC CONSTIPATION: ICD-10-CM

## 2021-10-04 DIAGNOSIS — E04.1 THYROID NODULE: ICD-10-CM

## 2021-10-04 DIAGNOSIS — Z11.4 SCREENING FOR HUMAN IMMUNODEFICIENCY VIRUS: ICD-10-CM

## 2021-10-04 DIAGNOSIS — Z12.31 ENCOUNTER FOR SCREENING MAMMOGRAM FOR MALIGNANT NEOPLASM OF BREAST: ICD-10-CM

## 2021-10-04 DIAGNOSIS — B18.2 CHRONIC HEPATITIS C WITHOUT HEPATIC COMA (HCC): ICD-10-CM

## 2021-10-04 DIAGNOSIS — L98.9 SKIN LESIONS: ICD-10-CM

## 2021-10-04 DIAGNOSIS — R53.83 FATIGUE, UNSPECIFIED TYPE: ICD-10-CM

## 2021-10-04 PROCEDURE — 99396 PREV VISIT EST AGE 40-64: CPT | Performed by: INTERNAL MEDICINE

## 2021-10-04 PROCEDURE — 93000 ELECTROCARDIOGRAM COMPLETE: CPT | Performed by: INTERNAL MEDICINE

## 2021-10-04 PROCEDURE — 1036F TOBACCO NON-USER: CPT | Performed by: INTERNAL MEDICINE

## 2021-10-04 PROCEDURE — 99214 OFFICE O/P EST MOD 30 MIN: CPT | Performed by: INTERNAL MEDICINE

## 2021-10-12 ENCOUNTER — HOSPITAL ENCOUNTER (OUTPATIENT)
Dept: ULTRASOUND IMAGING | Facility: HOSPITAL | Age: 46
Discharge: HOME | End: 2021-10-12
Payer: COMMERCIAL

## 2021-10-12 ENCOUNTER — APPOINTMENT (OUTPATIENT)
Dept: LAB | Facility: HOSPITAL | Age: 46
End: 2021-10-12
Payer: COMMERCIAL

## 2021-10-12 DIAGNOSIS — Z11.4 SCREENING FOR HUMAN IMMUNODEFICIENCY VIRUS: ICD-10-CM

## 2021-10-12 DIAGNOSIS — E04.1 THYROID NODULE: ICD-10-CM

## 2021-10-12 DIAGNOSIS — Z00.01 ENCOUNTER FOR GENERAL ADULT MEDICAL EXAMINATION WITH ABNORMAL FINDINGS: ICD-10-CM

## 2021-10-12 DIAGNOSIS — B18.2 CHRONIC HEPATITIS C WITHOUT HEPATIC COMA (HCC): ICD-10-CM

## 2021-10-12 LAB
25(OH)D3 SERPL-MCNC: 49.8 NG/ML (ref 30–100)
ALBUMIN SERPL BCP-MCNC: 3.8 G/DL (ref 3.5–5)
ALP SERPL-CCNC: 51 U/L (ref 46–116)
ALT SERPL W P-5'-P-CCNC: 20 U/L (ref 12–78)
ANION GAP SERPL CALCULATED.3IONS-SCNC: 7 MMOL/L (ref 4–13)
AST SERPL W P-5'-P-CCNC: 16 U/L (ref 5–45)
BASOPHILS # BLD AUTO: 0.02 THOUSANDS/ΜL (ref 0–0.1)
BASOPHILS NFR BLD AUTO: 0 % (ref 0–1)
BILIRUB SERPL-MCNC: 0.36 MG/DL (ref 0.2–1)
BUN SERPL-MCNC: 14 MG/DL (ref 5–25)
CALCIUM SERPL-MCNC: 9.3 MG/DL (ref 8.3–10.1)
CHLORIDE SERPL-SCNC: 106 MMOL/L (ref 100–108)
CHOLEST SERPL-MCNC: 197 MG/DL (ref 50–200)
CO2 SERPL-SCNC: 28 MMOL/L (ref 21–32)
CREAT SERPL-MCNC: 0.73 MG/DL (ref 0.6–1.3)
EOSINOPHIL # BLD AUTO: 0.11 THOUSAND/ΜL (ref 0–0.61)
EOSINOPHIL NFR BLD AUTO: 2 % (ref 0–6)
ERYTHROCYTE [DISTWIDTH] IN BLOOD BY AUTOMATED COUNT: 12.2 % (ref 11.6–15.1)
GFR SERPL CREATININE-BSD FRML MDRD: 100 ML/MIN/1.73SQ M
GLUCOSE P FAST SERPL-MCNC: 90 MG/DL (ref 65–99)
HCT VFR BLD AUTO: 39 % (ref 34.8–46.1)
HDLC SERPL-MCNC: 67 MG/DL
HGB BLD-MCNC: 12.6 G/DL (ref 11.5–15.4)
IMM GRANULOCYTES # BLD AUTO: 0.02 THOUSAND/UL (ref 0–0.2)
IMM GRANULOCYTES NFR BLD AUTO: 0 % (ref 0–2)
LDLC SERPL CALC-MCNC: 121 MG/DL (ref 0–100)
LYMPHOCYTES # BLD AUTO: 2.12 THOUSANDS/ΜL (ref 0.6–4.47)
LYMPHOCYTES NFR BLD AUTO: 35 % (ref 14–44)
MAGNESIUM SERPL-MCNC: 2.3 MG/DL (ref 1.6–2.6)
MCH RBC QN AUTO: 27.5 PG (ref 26.8–34.3)
MCHC RBC AUTO-ENTMCNC: 32.3 G/DL (ref 31.4–37.4)
MCV RBC AUTO: 85 FL (ref 82–98)
MONOCYTES # BLD AUTO: 0.38 THOUSAND/ΜL (ref 0.17–1.22)
MONOCYTES NFR BLD AUTO: 6 % (ref 4–12)
NEUTROPHILS # BLD AUTO: 3.5 THOUSANDS/ΜL (ref 1.85–7.62)
NEUTS SEG NFR BLD AUTO: 57 % (ref 43–75)
NONHDLC SERPL-MCNC: 130 MG/DL
NRBC BLD AUTO-RTO: 0 /100 WBCS
PLATELET # BLD AUTO: 249 THOUSANDS/UL (ref 149–390)
PMV BLD AUTO: 10.1 FL (ref 8.9–12.7)
POTASSIUM SERPL-SCNC: 3.7 MMOL/L (ref 3.5–5.3)
PROT SERPL-MCNC: 7.7 G/DL (ref 6.4–8.2)
RBC # BLD AUTO: 4.59 MILLION/UL (ref 3.81–5.12)
SODIUM SERPL-SCNC: 141 MMOL/L (ref 136–145)
T4 FREE SERPL-MCNC: 1.02 NG/DL (ref 0.76–1.46)
TRIGL SERPL-MCNC: 44 MG/DL
TSH SERPL DL<=0.05 MIU/L-ACNC: 2.12 UIU/ML (ref 0.36–3.74)
VIT B12 SERPL-MCNC: 539 PG/ML (ref 100–900)
WBC # BLD AUTO: 6.15 THOUSAND/UL (ref 4.31–10.16)

## 2021-10-12 PROCEDURE — 82306 VITAMIN D 25 HYDROXY: CPT

## 2021-10-12 PROCEDURE — 85025 COMPLETE CBC W/AUTO DIFF WBC: CPT

## 2021-10-12 PROCEDURE — 84439 ASSAY OF FREE THYROXINE: CPT

## 2021-10-12 PROCEDURE — 83735 ASSAY OF MAGNESIUM: CPT

## 2021-10-12 PROCEDURE — 82607 VITAMIN B-12: CPT

## 2021-10-12 PROCEDURE — 86376 MICROSOMAL ANTIBODY EACH: CPT

## 2021-10-12 PROCEDURE — 36415 COLL VENOUS BLD VENIPUNCTURE: CPT

## 2021-10-12 PROCEDURE — 80061 LIPID PANEL: CPT

## 2021-10-12 PROCEDURE — 87389 HIV-1 AG W/HIV-1&-2 AB AG IA: CPT

## 2021-10-12 PROCEDURE — 76536 US EXAM OF HEAD AND NECK: CPT

## 2021-10-12 PROCEDURE — 80053 COMPREHEN METABOLIC PANEL: CPT

## 2021-10-12 PROCEDURE — 86800 THYROGLOBULIN ANTIBODY: CPT

## 2021-10-12 PROCEDURE — 84443 ASSAY THYROID STIM HORMONE: CPT

## 2021-10-12 PROCEDURE — 87522 HEPATITIS C REVRS TRNSCRPJ: CPT

## 2021-10-13 LAB
HIV 1+2 AB+HIV1 P24 AG SERPL QL IA: NORMAL
THYROGLOB AB SERPL-ACNC: <1 IU/ML (ref 0–0.9)
THYROPEROXIDASE AB SERPL-ACNC: <8 IU/ML (ref 0–34)

## 2021-10-14 LAB
HCV RNA SERPL NAA+PROBE-ACNC: NORMAL IU/ML
TEST INFORMATION: NORMAL

## 2021-10-19 ENCOUNTER — ANNUAL EXAM (OUTPATIENT)
Dept: OBGYN CLINIC | Facility: CLINIC | Age: 46
End: 2021-10-19

## 2021-10-19 VITALS
HEART RATE: 87 BPM | WEIGHT: 188 LBS | SYSTOLIC BLOOD PRESSURE: 132 MMHG | HEIGHT: 65 IN | DIASTOLIC BLOOD PRESSURE: 83 MMHG | BODY MASS INDEX: 31.32 KG/M2

## 2021-10-19 DIAGNOSIS — Z01.419 ENCOUNTER FOR ANNUAL ROUTINE GYNECOLOGICAL EXAMINATION: Primary | ICD-10-CM

## 2021-10-19 DIAGNOSIS — Z30.42 ENCOUNTER FOR SURVEILLANCE OF INJECTABLE CONTRACEPTIVE: ICD-10-CM

## 2021-10-19 PROCEDURE — 99396 PREV VISIT EST AGE 40-64: CPT | Performed by: NURSE PRACTITIONER

## 2021-10-19 PROCEDURE — 3008F BODY MASS INDEX DOCD: CPT | Performed by: INTERNAL MEDICINE

## 2021-10-19 RX ORDER — MEDROXYPROGESTERONE ACETATE 150 MG/ML
150 INJECTION, SUSPENSION INTRAMUSCULAR
Qty: 1 ML | Refills: 5 | Status: SHIPPED | OUTPATIENT
Start: 2021-10-19 | End: 2021-11-08

## 2021-10-19 RX ORDER — MEDROXYPROGESTERONE ACETATE 150 MG/ML
150 INJECTION, SUSPENSION INTRAMUSCULAR
Status: SHIPPED | OUTPATIENT
Start: 2021-10-19 | End: 2022-10-14

## 2021-10-19 RX ADMIN — MEDROXYPROGESTERONE ACETATE 150 MG: 150 INJECTION, SUSPENSION INTRAMUSCULAR at 10:50

## 2021-10-25 DIAGNOSIS — F41.0 GENERALIZED ANXIETY DISORDER WITH PANIC ATTACKS: ICD-10-CM

## 2021-10-25 DIAGNOSIS — F41.1 GENERALIZED ANXIETY DISORDER WITH PANIC ATTACKS: ICD-10-CM

## 2021-10-25 RX ORDER — ALPRAZOLAM 1 MG/1
1 TABLET ORAL 3 TIMES DAILY PRN
Qty: 90 TABLET | Refills: 0 | Status: SHIPPED | OUTPATIENT
Start: 2021-10-25 | End: 2021-11-22 | Stop reason: SDUPTHER

## 2021-10-28 ENCOUNTER — TELEPHONE (OUTPATIENT)
Dept: PSYCHIATRY | Facility: CLINIC | Age: 46
End: 2021-10-28

## 2021-10-29 ENCOUNTER — TELEPHONE (OUTPATIENT)
Dept: PSYCHIATRY | Facility: CLINIC | Age: 46
End: 2021-10-29

## 2021-11-01 ENCOUNTER — TELEPHONE (OUTPATIENT)
Dept: BEHAVIORAL/MENTAL HEALTH CLINIC | Facility: CLINIC | Age: 46
End: 2021-11-01

## 2021-11-01 DIAGNOSIS — F51.01 PRIMARY INSOMNIA: Primary | ICD-10-CM

## 2021-11-01 RX ORDER — TRAZODONE HYDROCHLORIDE 50 MG/1
50 TABLET ORAL
Qty: 30 TABLET | Refills: 1 | Status: SHIPPED | OUTPATIENT
Start: 2021-11-01 | End: 2021-11-30

## 2021-11-08 ENCOUNTER — OFFICE VISIT (OUTPATIENT)
Dept: FAMILY MEDICINE CLINIC | Facility: CLINIC | Age: 46
End: 2021-11-08
Payer: COMMERCIAL

## 2021-11-08 VITALS
BODY MASS INDEX: 30.32 KG/M2 | WEIGHT: 182 LBS | RESPIRATION RATE: 14 BRPM | SYSTOLIC BLOOD PRESSURE: 122 MMHG | HEIGHT: 65 IN | TEMPERATURE: 98.4 F | OXYGEN SATURATION: 98 % | DIASTOLIC BLOOD PRESSURE: 84 MMHG | HEART RATE: 86 BPM

## 2021-11-08 DIAGNOSIS — N76.0 ACUTE VAGINITIS: Primary | ICD-10-CM

## 2021-11-08 DIAGNOSIS — K58.1 IRRITABLE BOWEL SYNDROME WITH CONSTIPATION: ICD-10-CM

## 2021-11-08 DIAGNOSIS — Z12.11 SCREEN FOR COLON CANCER: ICD-10-CM

## 2021-11-08 DIAGNOSIS — Z23 NEED FOR INFLUENZA VACCINATION: ICD-10-CM

## 2021-11-08 PROCEDURE — 1036F TOBACCO NON-USER: CPT | Performed by: INTERNAL MEDICINE

## 2021-11-08 PROCEDURE — 3008F BODY MASS INDEX DOCD: CPT | Performed by: INTERNAL MEDICINE

## 2021-11-08 PROCEDURE — 99214 OFFICE O/P EST MOD 30 MIN: CPT | Performed by: INTERNAL MEDICINE

## 2021-11-08 RX ORDER — FLUCONAZOLE 150 MG/1
150 TABLET ORAL DAILY
Qty: 3 TABLET | Refills: 0 | Status: SHIPPED | OUTPATIENT
Start: 2021-11-08 | End: 2021-11-11

## 2021-11-22 DIAGNOSIS — F41.1 GENERALIZED ANXIETY DISORDER WITH PANIC ATTACKS: ICD-10-CM

## 2021-11-22 DIAGNOSIS — F41.0 GENERALIZED ANXIETY DISORDER WITH PANIC ATTACKS: ICD-10-CM

## 2021-11-22 RX ORDER — ALPRAZOLAM 1 MG/1
1 TABLET ORAL 3 TIMES DAILY PRN
Qty: 90 TABLET | Refills: 0 | Status: SHIPPED | OUTPATIENT
Start: 2021-11-22 | End: 2021-11-30 | Stop reason: SDUPTHER

## 2021-11-30 ENCOUNTER — OFFICE VISIT (OUTPATIENT)
Dept: PSYCHIATRY | Facility: CLINIC | Age: 46
End: 2021-11-30
Payer: COMMERCIAL

## 2021-11-30 DIAGNOSIS — F11.20 OPIATE DEPENDENCE, CONTINUOUS (HCC): ICD-10-CM

## 2021-11-30 DIAGNOSIS — F51.01 PRIMARY INSOMNIA: ICD-10-CM

## 2021-11-30 DIAGNOSIS — F41.0 GENERALIZED ANXIETY DISORDER WITH PANIC ATTACKS: Primary | ICD-10-CM

## 2021-11-30 DIAGNOSIS — F41.1 GENERALIZED ANXIETY DISORDER WITH PANIC ATTACKS: Primary | ICD-10-CM

## 2021-11-30 PROCEDURE — 99214 OFFICE O/P EST MOD 30 MIN: CPT | Performed by: STUDENT IN AN ORGANIZED HEALTH CARE EDUCATION/TRAINING PROGRAM

## 2021-11-30 RX ORDER — TRAZODONE HYDROCHLORIDE 50 MG/1
50 TABLET ORAL
Qty: 90 TABLET | Refills: 0 | Status: SHIPPED | OUTPATIENT
Start: 2021-11-30 | End: 2022-03-01 | Stop reason: SDUPTHER

## 2021-11-30 RX ORDER — ALPRAZOLAM 1 MG/1
1 TABLET ORAL 3 TIMES DAILY PRN
Qty: 90 TABLET | Refills: 0 | Status: SHIPPED | OUTPATIENT
Start: 2021-11-30 | End: 2022-01-17 | Stop reason: SDUPTHER

## 2021-12-20 DIAGNOSIS — F41.0 GENERALIZED ANXIETY DISORDER WITH PANIC ATTACKS: ICD-10-CM

## 2021-12-20 DIAGNOSIS — F41.0 PANIC ATTACKS: ICD-10-CM

## 2021-12-20 DIAGNOSIS — F41.1 GENERALIZED ANXIETY DISORDER WITH PANIC ATTACKS: ICD-10-CM

## 2021-12-20 RX ORDER — ALPRAZOLAM 1 MG/1
1 TABLET ORAL 3 TIMES DAILY PRN
Qty: 90 TABLET | Refills: 0 | Status: CANCELLED | OUTPATIENT
Start: 2021-12-20

## 2021-12-20 RX ORDER — PROMETHAZINE HYDROCHLORIDE 25 MG/1
25 TABLET ORAL
Qty: 30 TABLET | Refills: 2 | Status: SHIPPED | OUTPATIENT
Start: 2021-12-20 | End: 2022-03-01 | Stop reason: SDUPTHER

## 2022-01-04 ENCOUNTER — CLINICAL SUPPORT (OUTPATIENT)
Dept: OBGYN CLINIC | Facility: CLINIC | Age: 47
End: 2022-01-04

## 2022-01-04 VITALS
WEIGHT: 188 LBS | SYSTOLIC BLOOD PRESSURE: 121 MMHG | DIASTOLIC BLOOD PRESSURE: 81 MMHG | HEART RATE: 81 BPM | BODY MASS INDEX: 31.28 KG/M2

## 2022-01-04 DIAGNOSIS — Z30.42 ENCOUNTER FOR SURVEILLANCE OF INJECTABLE CONTRACEPTIVE: Primary | ICD-10-CM

## 2022-01-04 PROCEDURE — 96372 THER/PROPH/DIAG INJ SC/IM: CPT

## 2022-01-04 RX ORDER — MEDROXYPROGESTERONE ACETATE 150 MG/ML
150 INJECTION, SUSPENSION INTRAMUSCULAR ONCE
Status: COMPLETED | OUTPATIENT
Start: 2022-01-04 | End: 2022-01-04

## 2022-01-04 RX ADMIN — MEDROXYPROGESTERONE ACETATE 150 MG: 150 INJECTION, SUSPENSION INTRAMUSCULAR at 11:02

## 2022-01-17 DIAGNOSIS — F41.0 GENERALIZED ANXIETY DISORDER WITH PANIC ATTACKS: ICD-10-CM

## 2022-01-17 DIAGNOSIS — F41.1 GENERALIZED ANXIETY DISORDER WITH PANIC ATTACKS: ICD-10-CM

## 2022-01-17 RX ORDER — ALPRAZOLAM 1 MG/1
1 TABLET ORAL 3 TIMES DAILY PRN
Qty: 90 TABLET | Refills: 0 | Status: SHIPPED | OUTPATIENT
Start: 2022-01-17 | End: 2022-02-14 | Stop reason: SDUPTHER

## 2022-01-17 NOTE — TELEPHONE ENCOUNTER
PDMP website reviewed  Brant Hernandez has been appropriately adherent to controlled psychotropic medications without evidence of abuse or misuse  As such, will send 30-day refill to pharmacy of choice and follow up as necessary

## 2022-02-14 DIAGNOSIS — F41.0 GENERALIZED ANXIETY DISORDER WITH PANIC ATTACKS: ICD-10-CM

## 2022-02-14 DIAGNOSIS — F41.1 GENERALIZED ANXIETY DISORDER WITH PANIC ATTACKS: ICD-10-CM

## 2022-02-14 NOTE — TELEPHONE ENCOUNTER
PDMP website reviewed  Hitesh Monet has been appropriately adherent to controlled psychotropic medications without evidence of abuse or misuse  As such, will send 30-day refill to pharmacy of choice and follow up as necessary

## 2022-02-15 ENCOUNTER — TELEPHONE (OUTPATIENT)
Dept: PSYCHIATRY | Facility: CLINIC | Age: 47
End: 2022-02-15

## 2022-02-15 RX ORDER — ALPRAZOLAM 1 MG/1
1 TABLET ORAL 3 TIMES DAILY PRN
Qty: 90 TABLET | Refills: 0 | Status: SHIPPED | OUTPATIENT
Start: 2022-02-15 | End: 2022-03-01 | Stop reason: SDUPTHER

## 2022-02-15 NOTE — TELEPHONE ENCOUNTER
Shereen MARC on nursing line  She stated she needs a PA for her Xanax  She does not know what to do now       [facility]- [phone]

## 2022-02-15 NOTE — TELEPHONE ENCOUNTER
Patient called and is inquiring about the status of her requested refill as she called the pharmacy and they stated they did not receive anything

## 2022-02-16 ENCOUNTER — TELEPHONE (OUTPATIENT)
Dept: PSYCHIATRY | Facility: CLINIC | Age: 47
End: 2022-02-16

## 2022-02-16 NOTE — TELEPHONE ENCOUNTER
Patient called and left a voice mail requesting a Pr Authorization for medication   Please reach out to patient or pharmacy regarding Pre Authorization

## 2022-02-16 NOTE — TELEPHONE ENCOUNTER
Spoke to [place] at [facility] to inform her that no PA is required for the Alprazolam 1 mg tab  Joey states that there are several rejection notices for this medication:  Exceeds controlled substance fill limit  Apparent drug misuse  Doctor not enrolled in [facility]    Directed to call Bradnon Briscoe at  [phone]  Spoke to Lisa Lau at [facility] notifying her of the direction to call them  Lisa Lau states that the rejection is due to the Alprazolam interacting with another prescribed drug  Lisa Lau could not elaborate nor give the other medication name due to a "super HIPAA "    Lisa Lau does say to do the following to resolve the PA rejection issue: The doctor must sign into the PDMP and review which medication is interacting with the Alprazolam   Then we are to call back Brandon Briscoe to tell them the the doctor is aware of  the interaction of the medication, and that he regulary checks the PDMP  Please advise

## 2022-02-16 NOTE — TELEPHONE ENCOUNTER
PDMP website reviewed  Hadley Zepeda has been appropriately adherent to controlled psychotropic medications without evidence of abuse or misuse  Her last refill of Xanax was exactly 1 month ago  I am aware that Hadley Zepeda is currently prescribed Zubsolv 8 6-2 1 MG Tablets for history of opiate use  I am aware that concurrent use of opiates and benzodiazapine can result in respiratory suppression  Hadley Zepeda has also been made aware of this at each office visit  As a result of this potentially problematic combination, Shereen and I have been working to taper her off Xanax, as indicated in documentation from prior appointments

## 2022-02-16 NOTE — TELEPHONE ENCOUNTER
Received fax from pharmacy requesting completion of an already initiated PA request for Alprazolam 1 mg   KEY [id number]    [facility] that [facility] is not [facility]

## 2022-02-17 NOTE — TELEPHONE ENCOUNTER
Spoke to Nikko at [employer], notifying her of the Alprazolam PA request   Performed a verbal re-submit of the PA, giving Nikko the requested information from Dr Ness El states that we will receive a decision within 24 hours  Case ID# [id number]    Spoke to Jorje Burton to notify her of the Alprazolam 1 mg tab PA resubmission and that we will call her back when a decision is reached, within 24 hours  For your review

## 2022-02-18 NOTE — TELEPHONE ENCOUNTER
Received a fax from [facility] approving the Alprazolam 1 mg tab (TID) PA from 2/17/22-8/17/22  Spoke to [employer] at [facility] notifying her of the Alprazolam 1 mg (TID) PA approval, and Mildred states that there is a paid claim  Spoke to Gary notifying her of the Alprazolam 1 mg (TID) PA approval, and that this medication is available at the pharmacy  For your review  Will scan to Media

## 2022-03-01 ENCOUNTER — OFFICE VISIT (OUTPATIENT)
Dept: PSYCHIATRY | Facility: CLINIC | Age: 47
End: 2022-03-01
Payer: COMMERCIAL

## 2022-03-01 DIAGNOSIS — F41.0 GENERALIZED ANXIETY DISORDER WITH PANIC ATTACKS: Primary | ICD-10-CM

## 2022-03-01 DIAGNOSIS — F51.01 PRIMARY INSOMNIA: ICD-10-CM

## 2022-03-01 DIAGNOSIS — F41.1 GENERALIZED ANXIETY DISORDER WITH PANIC ATTACKS: Primary | ICD-10-CM

## 2022-03-01 DIAGNOSIS — F41.0 PANIC ATTACKS: ICD-10-CM

## 2022-03-01 PROCEDURE — 99214 OFFICE O/P EST MOD 30 MIN: CPT | Performed by: STUDENT IN AN ORGANIZED HEALTH CARE EDUCATION/TRAINING PROGRAM

## 2022-03-01 RX ORDER — ALPRAZOLAM 1 MG/1
1 TABLET ORAL 3 TIMES DAILY PRN
Qty: 90 TABLET | Refills: 0 | Status: SHIPPED | OUTPATIENT
Start: 2022-03-01 | End: 2022-03-18 | Stop reason: SDUPTHER

## 2022-03-01 RX ORDER — PROMETHAZINE HYDROCHLORIDE 25 MG/1
25 TABLET ORAL
Qty: 30 TABLET | Refills: 2 | Status: SHIPPED | OUTPATIENT
Start: 2022-03-01 | End: 2022-04-11 | Stop reason: SDUPTHER

## 2022-03-01 RX ORDER — TRAZODONE HYDROCHLORIDE 50 MG/1
50 TABLET ORAL
Qty: 30 TABLET | Refills: 2 | Status: SHIPPED | OUTPATIENT
Start: 2022-03-01

## 2022-03-01 NOTE — BH TREATMENT PLAN
TREATMENT PLAN (Medication Management Only)        [facility]    Name and Date of Birth:  Linn Krishna 56 y o  1975  Date of Treatment Plan: March 1, 2022  Diagnosis/Diagnoses:    1  Generalized anxiety disorder with panic attacks    2  Primary insomnia      Strengths/Personal Resources for Self-Care: supportive family, supportive friends, taking medications as prescribed, ability to adapt to life changes, ability to communicate needs, ability to communicate well, ability to listen, ability to reason, good physical health, good understanding of illness, independence, motivation for treatment, ability to negotiate basic needs, being resoureceful, self-reliance, special hobby/interest, willingness to work on problems  Area/Areas of need (in own words): anxiety symptoms  1  Long Term Goal: maintain control of anxiety  Target Date:6 months - 9/1/2022  Person/Persons responsible for completion of goal: Shereen  2  Short Term Objective (s) - How will we reach this goal?:   A  Provider new recommended medication/dosage changes and/or continue medication(s): continue current medications as prescribed  B  Attend medication management appointments regularly  C  Take psychiatric medications responsibly  D, Improve quality of sleep  E> Travel to see family in [place]  Assist with daughter's pregnancy care  Target Date:6 months - 9/1/2022  Person/Persons Responsible for Completion of Goal: Shereen  Progress Towards Goals: stable, continuing treatment  Treatment Modality: medication management every 3 months  Review due 180 days from date of this plan: 6 months - 9/1/2022  Expected length of service: ongoing treatment  My Physician/PA/NP and I have developed this plan together and I agree to work on the goals and objectives  I understand the treatment goals that were developed for my treatment  Treatment Plan completed with assistance and input from patient and verbal consent provided  Treatment plan was not signed at time of office visit secondary to COVID-19 social distancing guidelines

## 2022-03-01 NOTE — PSYCH
MEDICATION MANAGEMENT NOTE        [facility]      Name and Date of Birth:  Marisa Dickens 56 y o  1975 MRN: [id number]    Date of Visit: March 1, 2022    Reason for Visit: Follow-up visit for medication management     SUBJECTIVE:    Marisa Dickens is a 56 y o  female with past psychiatric history significant for generalized anxiety disorder with panic attacks, opiate dependence (Rx Zubsolv via [facility]), insomnia, and extensive history of polysubstance abuse (cannabis, hallucinogens, cocaine) who was personally seen and evaluated today at the [address] [place] [address] outpatient clinic for follow-up and medication management  Yessica Garcia presents as calm, pleasant, and cooperative  Her thoughts are organized and linear  She completes assessment without difficulty  [facility] remains on Xanax 1mg TID  PDMP website reviewed, no concerns for abuse or misuse  She endorses adverse to Zubsov and is committed to her recovery  She denies recent cravings for substance use  She has been sober for years  Risks and side effects of chronic benzodiazepine use discussed with Marisa Dickens , including risk for falls, sedation, respiratory depression (especially if taken in higher doses than prescribed or if taken together with another sedating substance such as alcohol or opiate medications), as well as risk of addiction (especially if taken more often or at higher doses than prescribed) and withdrawal (if stops abruptly, especially if taken more often or at higher doses) including seizures and death  Yessica Garcia was instructed to not drive or operate heavy machinery while taking benzodiazepines, as the medication can cause increased drowsiness  Shereen verbalized understanding  Yessica Garcia continues to support long-term tapering of Xanax given concurrent use of MAT  However, since she has been on Xanax for 20+ years, this process is arduous   Acutely, Yessica Garcia endorses appropriate control of anxiety  She is without recent panic symptomatology  She does not want to convert to Ativan or Klonopin for easier tapering, which was discussed today  Candance Denis denies pathologic worry or nervousness at the moment  She is future-oriented, detailing plans to travel to [place] in April [facility]  She also discusses excitement regarding "new teeth" that should be available by April  We discussed self-worth once again related to this and ways her identity is shifting  Candance Denis shares that her daughter is pregnant with a boy  She will find purpose and caring for this child when her daughter is working  Candance Denis currently denies SI/HI  She has no plans to harm others  Her appetite is sufficient  Her sleep is fair as she continues use of Promethazine and PRN Trazodone  She is without side effect  Candance Denis does report report nightmares associated with past drug use  Her energy and motivation are at baseline  Candance Denis is without hopelessness or guilt  Her concentration/focus is satisfactory  During today's examination, Candance Denis does not exhibit objective evidence of j carlos/hypomania or vickie psychosis  Candance Denis denies recent ETOH abuse  She offers no further concerns  Current Rating Scores:     None completed today      Review Of Systems:      Constitutional negative   ENT negative   Cardiovascular negative   Respiratory negative   Gastrointestinal negative   Genitourinary negative   Musculoskeletal negative   Integumentary negative   Neurological negative   Endocrine negative   Other Symptoms none, all other systems are negative       Past Psychiatric History: (unchanged information from previous note copied and italicized) - Information that is bolded has been updated       Inpatient psychiatric admissions: Numerous past psychiatric admissions - last in 2017 at [facility] for depression, SI in context of drug use  Prior outpatient psychiatric linkage: Numerous psychiatrists in the past - Last linkage with [facility]  Past/current psychotherapy: History of psychotherapy linkage, no current therapists  History of suicidal attempts/gestures: Numerous attempts in the past including overdose and "cutting wrists"  History of violence/aggressive behaviors: Denies  Psychotropic medication trials: Lexapro, Prozac, Zoloft, Paxil, Remeron, Trazodone, Wellbutrin, BuSpar, VPA, Lithium, Risperdal, Seroquel, Gabapentin, Propranolol, Clonidine, Valium, Xanax, Klonopin, Adderall, Ativan, Librium, Provigil, Pristiq (too much GI discomfort)    Substance abuse inpatient/outpatient rehabilitation: 10+ rehab/detox admissions - first at the age of 17 in [facility] - last in 2017 [facility]     Substance Abuse History: (unchanged information from previous note copied and italicized) - Information that is bolded has been updated      Extensive history of illict substance (cocaine, methamphetamine, cannabis, opiates, hallucinogen), and tobacco abuse  She denies a history of ETOH abuse  No past legal actions yet 1 prior arrest secondary to substance intoxication  The patient denies prior DWIs/DUIs  Extensive history of outpatient/inpatient rehabilitation programs  Shereen does not exhibit objective evidence of substance withdrawal during today's examination nor does Shereen appear under the influence of any psychoactive substance           Social History: (unchanged information from previous note copied and italicized) - Information that is bolded has been updated       Developmental: Denies a history of milestone/developmental delay  Denies a history of in-utero exposure to toxins/illicit substances  There is no documented history of IEP or need for special education    [address]  Marital history:   Living arrangement, social support: , has 2 children (daughter and son)  Occupational History: on permanent disability  Access to firearms: Denies direct access to weapons/firearms  Shereen Paris has no history of arrests or violence with a deadly weapon       Traumatic History: (unchanged information from previous note copied and italicized) - Information that is bolded has been updated       Abuse:none is reported  Other Traumatic Events: Father dying in 2017 was problematic       Past Medical History:    Past Medical History:   Diagnosis Date    Addiction [facility] drug ([facility] )     Anxiety     Arthritis     Depression     Endometriosis     no current issues    Infectious viral hepatitis     history of hep c    Irritable bowel syndrome     Substance abuse ([place] [address] )      Past Medical History Pertinent Negatives:   Diagnosis Date Noted    Abnormal Pap smear of cervix 2018     Past Surgical History:   Procedure Laterality Date    APPENDECTOMY      CHOLECYSTECTOMY      HEMORRHOID SURGERY       Allergies   Allergen Reactions    Ciprofloxacin Anaphylaxis     Interacts with Zubsolv    Codeine Lightheadedness     Reaction Date: 2011;     Morphine Lightheadedness     Reaction Date: 2011;     Sulfa Antibiotics Anaphylaxis and Rash    Tramadol Lightheadedness       Substance Abuse History:    Social History     Substance and Sexual Activity   Alcohol Use Yes    Comment: rarely/holidays     Social History     Substance and Sexual Activity   Drug Use Not Currently    Types: Heroin, Cocaine       Social History:    Social History     Socioeconomic History    Marital status: /Civil Union     Spouse name: Lucius    Number of children: 2    Years of education: Not on file    Highest education level: Not on file   Occupational History    Occupation: homemaker   Tobacco Use    Smoking status: Former Smoker     Quit date: 3/14/2019     Years since quittin 9    Smokeless tobacco: Never Used   Vaping Use    Vaping Use: Never used   Substance and Sexual Activity    Alcohol use: Yes     Comment: rarely/holidays    Drug use: Not Currently     Types: Heroin, Cocaine    Sexual activity: Yes     Partners: Male     Birth control/protection: Injection   Other Topics Concern    Not on file   Social History Narrative    Not on file     Social Determinants of Health     Financial Resource Strain: Not on file   Food Insecurity: Not on file   Transportation Needs: Not on file   Physical Activity: Not on file   Stress: Not on file   Social Connections: Not on file   Intimate Partner Violence: Not on file   Housing Stability: [address] Unable to Pay for Housing in the Last Year: No    Number of [place] in the Last Year: 1    Unstable Housing in the Last Year: No       Family Psychiatric History:     Family History   Problem Relation Age of Onset    Cancer Father     Drug abuse Father     Drug abuse Mother        History Review: The following portions of the patient's history were reviewed and updated as appropriate: allergies, current medications, past family history, past medical history, past social history, past surgical history and problem list          OBJECTIVE:     Vital signs in last 24 hours: There were no vitals filed for this visit      Mental Status Evaluation:    Appearance age appropriate, casually dressed, dressed appropriately, looks stated age, piercings, tattooed   Behavior pleasant, cooperative, calm, good eye contact   Speech normal rate, normal volume, normal pitch, clear   Mood euthymic   Affect normal range and intensity, appropriate   Thought Processes organized, logical, goal directed, normal rate of thoughts, normal abstract reasoning   Associations intact associations   Thought Content no overt delusions   Perceptual Disturbances: no auditory hallucinations, no visual hallucinations   Abnormal Thoughts  Risk Potential Suicidal ideation - None at present  Homicidal ideation - None at present  Potential for aggression - No   Orientation oriented to person, place, time/date and situation   Memory recent and remote memory grossly intact Consciousness alert and awake   Attention Span Concentration Span attention span and concentration are age appropriate   Intellect appears to be of average intelligence   Insight intact and good   Judgement intact and good   Muscle Strength and  Gait normal gait and normal balance   Motor activity no abnormal movements   Language no difficulty naming common objects   Fund of Knowledge adequate knowledge of current events   Pain none   Pain Scale 0       Laboratory Results: I have personally reviewed all pertinent laboratory/tests results    Recent Labs (last 4 months):   No visits with results within 4 Month(s) from this visit     Latest known visit with results is:   Appointment on 10/12/2021   Component Date Value    HIV-1/HIV-2 Ab 10/12/2021 Non-Reactive     Sodium 10/12/2021 141     Potassium 10/12/2021 3 7     Chloride 10/12/2021 106     CO2 10/12/2021 28     ANION GAP 10/12/2021 7     BUN 10/12/2021 14     Creatinine 10/12/2021 0 73     Glucose, Fasting 10/12/2021 90     Calcium 10/12/2021 9 3     AST 10/12/2021 16     ALT 10/12/2021 20     Alkaline Phosphatase 10/12/2021 51     Total Protein 10/12/2021 7 7     Albumin 10/12/2021 3 8     Total Bilirubin 10/12/2021 0 36     eGFR 10/12/2021 100     WBC 10/12/2021 6 15     RBC 10/12/2021 4 59     Hemoglobin 10/12/2021 12 6     Hematocrit 10/12/2021 39 0     MCV 10/12/2021 85     MCH 10/12/2021 27 5     MCHC 10/12/2021 32 3     RDW 10/12/2021 12 2     MPV 10/12/2021 10 1     Platelets 82/19/8095 249     nRBC 10/12/2021 0     Neutrophils Relative 10/12/2021 57     Immat GRANS % 10/12/2021 0     Lymphocytes Relative 10/12/2021 35     Monocytes Relative 10/12/2021 6     Eosinophils Relative 10/12/2021 2     Basophils Relative 10/12/2021 0     Neutrophils Absolute 10/12/2021 3 50     Immature Grans Absolute 10/12/2021 0 02     Lymphocytes Absolute 10/12/2021 2 12     Monocytes Absolute 10/12/2021 0 38     Eosinophils Absolute 10/12/2021 0 11     Basophils Absolute 10/12/2021 0 02     Magnesium 10/12/2021 2 3     Cholesterol 10/12/2021 197     Triglycerides 10/12/2021 44     HDL, Direct 10/12/2021 67     LDL Calculated 10/12/2021 121*    Non-HDL-Chol (CHOL-HDL) 10/12/2021 130     TSH 3RD GENERATON 10/12/2021 2 119     Free T4 10/12/2021 1 02     Vitamin B-12 10/12/2021 539     Vit D, 25-Hydroxy 10/12/2021 49 8     HCV PCR Quantitative 10/12/2021 HCV Not Detected     Test Information 10/12/2021 Comment     THYROID MICROSOMAL ANTIB* 10/12/2021 <8     Thyroglobulin Ab 10/12/2021 <1 0        Suicide/Homicide Risk Assessment:    The following interventions are recommended: no intervention changes needed      Lethality Statement:    Based on today's assessment and clinical criteria, [facility] contracts for safety and is not an imminent risk of harm to self or others  Outpatient level of care is deemed appropriate at this current time  Saurav Wong understands that if they can no longer contract for safety, they need to call the office or report to their nearest Emergency Room for immediate evaluation  Assessment/Plan:       Shereen Paris is a 56 y o  female with past psychiatric history significant for generalized anxiety disorder with panic attacks, opiate dependence (Rx Zubsolv via [facility]), insomnia, and extensive history of polysubstance abuse (cannabis, hallucinogens, cocaine) who was personally seen and evaluated today at the [address] [place] [address] [address] outpatient clinic for follow-up and medication management  Shereen endorses a longstanding history of anxiety and episodic depression that served as the catalyst for her substance abuse  She states that her biological parents abused illicit substances and thus, they were emotionally neglectful and inconsistent  At the age of 16, Saurav Wong began using cannabis which ultimately lead to use of numerous illicit substances   Shereen reports placement in 10+ detox/rehabilitation programs throughout the course of her life  Her first rehabilitation program was at age 17 in [facility]  She last attended [facility] in [facility], following the death of her father and and subsequent inpatient admission at [facility]  She endorses sobriety from opiates and other illicit substances for the last 3+ years  She was recently psychiatrically managed via CRNP Mrs Edgard Johnson through [facility]  However, given their change in policy regarding concurrent use of opiates and benzodiazepines, Jennifer Bello was transferred to [facility] for safer, more appropriate tapering process  Jennifer Bello has been treated with benzodiazepines since her early 20's  She was previously on Xanax 6mg Daily (2mg TID) but was recently tapered to 4mg Daily (2mg AM, 1mg afternoon, 1mg QHS) over the last 8 months  PDMP [place] reviewed, no concerns for abuse or misuse  I spoke at length with Jennifer Bello about 's policy regarding concurrent use of opiates and benzodiazepines and risks/alternatives to treatment  She voiced understanding regarding the need to taper off Xanax and was agreeable to do so  I also discussed possibility of faster tapering process via detox program at [facility], to which she was resistant given responsibility of caring for her family  Shereen denies most neurovegetative symptomatology suggestive of major depressive disorder  Shereen adamantly denies acute thoughts of suicide or self-harm  Shereen has no plans to harm others  There is a documented history of prior suicidal gestures/suicidal attempts  Shereen admits to overdosing and "cutting her wrists" in the past  She has not harmed herself or attempted to end her life in 4+ years  Shereen endorses a historical struggle with anxiety and symptomatology consistent with generalized anxiety disorder  Shereen reports anxiety that is pathologic in nature  Shereen endorses excessive nervousness, irrational worry, and overt anxiousness   Shereen is not pervasively restless or tense but does report feeling episodically keyed-up and on-edge  Shereen experiences disruption in energy and concentration secondary to anxiety  There is evidence to suggest that Shereen experiences irritability, inability to relax, and disruption in sleep in the past, secondary to pathologic anxiety  Shereen denies new-onset panic symptomatology but does report a history of panic attacks characterized by tremor, SOB, fear of impending doom, and overt anxiousness  She denies maladaptive behaviors  Shereen vehemently denies any acute or chronic history suggestive of an underlying affective (bipolar) organization  Shereen denies historical symptomatology suggestive of an underlying psychotic process  Shereen denies historical symptomatology suggestive of PTSD, OCD, or disordered eating       Today's Plan:    Psychopharmacologically, Shereen reports mood stability and appropriate control of anxiety with Xanax  Yessica Garcia continues to support long-term tapering of Xanax given concurrent use of MAT  However, since she has been on Xanax for 20+ years, this process is arduous  Acutely, Yessica Garcia endorses appropriate control of anxiety  She is without recent panic symptomatology  She does not want to convert to Ativan or Klonopin for easier tapering, which was discussed today   Will continue current dose and tapering further at next visit         DSM-V Diagnoses:      1 ) Generalized Anxiety Disorder with Panic Attacks   2 ) Opiate dependence, continuous  3 ) Polysubstance abuse by history   4 ) Insomnia        Treatment Recommendations/Precautions:     1 ) Generalized Anxiety Disorder with Panic Attacks   - Continue Xanax 1mg TID - tapering process in effect (was previously on high doses)              - Plan is to ultimately taper off Xanax given concurrent use of Suboxone, however, Yessica Garcia has been on BZ for 20+ years so tapering process will be arduous   - Psychoeducation provided regarding the importance of exercise and healthy dietary choices and their impact on mood, energy, and motivation  - Counseled to avoid ETOH, illict substances, and nicotine secondary to the detrimental effects of these substances on mental and physical health  - Encouraged to engage in non-verbal forms of therapy such as art therapy, music therapy, and mindfulness  - Discussed the bio-psycho-social model to treatment and therapeutic exercises/interventions were attempted to cognitively restructure thoughts     2 ) Opiate dependence, continuous  - Rx [facility]solv 8 6mg [facility]       3  ) Polysubstance abuse by history   - Currently sober  - Counseled to avoid ETOH, illict substances, and nicotine secondary to the detrimental effects of these substances on mental and physical health     4 ) Insomnia  - Continue Promethazine 25mg QHS PRN  - Continue Trazadone 25-50mg QHS       Medication management every 3 months  Aware of need to follow up with family physician for medical issues  Aware of 24 hour and weekend coverage for urgent situations accessed by calling [facility] main practice number    Medications Risks/Benefits      Risks, Benefits And Possible Side Effects Of Medications:    Risks, benefits, and possible side effects of medications explained to [facility] including risk of parkinsonian symptoms, Tardive Dyskinesia and metabolic syndrome related to treatment with antipsychotic medications, risk of suicidality and serotonin syndrome related to treatment with antidepressants and risks of misuse, abuse or dependence, sedation and respiratory depression related to treatment with benzodiazepine medications  She verbalizes understanding and agreement for treatment  Risks of medications in pregnancy explained to [facility]  She verbalizes understanding and agrees to notify her doctor if she becomes pregnant      Controlled Medication Discussion:     [facility] has been filling controlled prescriptions on time as prescribed according to [place] Prescription Drug Monitoring Program  Discussed with Maryse Davidson the risks of sedation, respiratory depression, impairment of ability to drive and potential for abuse and addiction related to treatment with benzodiazepine medications  She understands risk of treatment with benzodiazepine medications, agrees to not drive if feels impaired and agrees to take medications as prescribed  Discussed with [facility] warning on concurrent use of benzodiazepines and opioid medications including sedation, respiratory depression, coma and death  She understands the risk of treatment with benzodiazepines in addition to opioids and wants to continue taking those medications    Psychotherapy Provided:     Individual psychotherapy provided: Yes  Counseling was provided during the session today for 16 minutes  Medications, treatment progress and treatment plan reviewed with Shereen  Medication education provided to Maryse Davidson  Recent stressor including medical problems, occasional anxiety and no significant stressors discussed with Shereen  Coping strategies reviewed with Shereen  Educated on importance of medication and treatment compliance  Importance of follow up with family physician for medical issues reviewed with Maryse Davidson  Supportive therapy provided  Treatment Plan:    Completed and signed during the session: Yes - Treatment Plan done but not signed at time of office visit due to:  Plan reviewed in person and verbal consent given due to [place] social distancing    Note Share Disclaimer:      This note was not shared with the patient due to reasonable likelihood of causing patient harm    Arley Lee MD 03/01/22

## 2022-03-18 DIAGNOSIS — F41.0 GENERALIZED ANXIETY DISORDER WITH PANIC ATTACKS: ICD-10-CM

## 2022-03-18 DIAGNOSIS — F41.1 GENERALIZED ANXIETY DISORDER WITH PANIC ATTACKS: ICD-10-CM

## 2022-03-18 RX ORDER — ALPRAZOLAM 1 MG/1
1 TABLET ORAL 3 TIMES DAILY PRN
Qty: 90 TABLET | Refills: 0 | Status: SHIPPED | OUTPATIENT
Start: 2022-03-18 | End: 2022-05-13 | Stop reason: SDUPTHER

## 2022-03-18 NOTE — TELEPHONE ENCOUNTER
PDMP website reviewed  Elliot Guerrero has been appropriately adherent to controlled psychotropic medications without evidence of abuse or misuse  As such, will send 30-day refill to pharmacy of choice and follow up as necessary 
4

## 2022-03-22 ENCOUNTER — CLINICAL SUPPORT (OUTPATIENT)
Dept: OBGYN CLINIC | Facility: CLINIC | Age: 47
End: 2022-03-22

## 2022-03-22 VITALS
DIASTOLIC BLOOD PRESSURE: 72 MMHG | WEIGHT: 194 LBS | BODY MASS INDEX: 32.28 KG/M2 | HEART RATE: 92 BPM | SYSTOLIC BLOOD PRESSURE: 128 MMHG

## 2022-03-22 DIAGNOSIS — Z30.42 ENCOUNTER FOR SURVEILLANCE OF INJECTABLE CONTRACEPTIVE: ICD-10-CM

## 2022-03-22 PROCEDURE — 96372 THER/PROPH/DIAG INJ SC/IM: CPT

## 2022-03-22 RX ADMIN — MEDROXYPROGESTERONE ACETATE 150 MG: 150 INJECTION, SUSPENSION INTRAMUSCULAR at 10:54

## 2022-03-23 ENCOUNTER — OFFICE VISIT (OUTPATIENT)
Dept: FAMILY MEDICINE CLINIC | Facility: CLINIC | Age: 47
End: 2022-03-23
Payer: MEDICARE

## 2022-03-23 VITALS
BODY MASS INDEX: 32.06 KG/M2 | RESPIRATION RATE: 16 BRPM | OXYGEN SATURATION: 98 % | WEIGHT: 192.4 LBS | HEART RATE: 94 BPM | TEMPERATURE: 98.4 F | HEIGHT: 65 IN | DIASTOLIC BLOOD PRESSURE: 82 MMHG | SYSTOLIC BLOOD PRESSURE: 152 MMHG

## 2022-03-23 DIAGNOSIS — M54.16 LUMBAR RADICULOPATHY: ICD-10-CM

## 2022-03-23 DIAGNOSIS — R29.898 WEAKNESS OF LEFT LOWER EXTREMITY: ICD-10-CM

## 2022-03-23 DIAGNOSIS — R10.84 GENERALIZED ABDOMINAL PAIN: ICD-10-CM

## 2022-03-23 DIAGNOSIS — Z12.31 SCREENING MAMMOGRAM FOR BREAST CANCER: ICD-10-CM

## 2022-03-23 DIAGNOSIS — K59.09 OTHER CONSTIPATION: Primary | ICD-10-CM

## 2022-03-23 DIAGNOSIS — R10.2 PELVIC PAIN: ICD-10-CM

## 2022-03-23 DIAGNOSIS — R01.1 HEART MURMUR: ICD-10-CM

## 2022-03-23 DIAGNOSIS — M79.662 PAIN OF LEFT CALF: ICD-10-CM

## 2022-03-23 PROCEDURE — 99214 OFFICE O/P EST MOD 30 MIN: CPT | Performed by: INTERNAL MEDICINE

## 2022-03-23 RX ORDER — GABAPENTIN 100 MG/1
100 CAPSULE ORAL 2 TIMES DAILY
Qty: 60 CAPSULE | Refills: 2 | Status: SHIPPED | OUTPATIENT
Start: 2022-03-23 | End: 2022-06-01

## 2022-03-23 NOTE — PROGRESS NOTES
Assessment/Plan:         Diagnoses and all orders for this visit:    Other constipation; Drug-Induced ? ? Try :  -     naloxegol oxalate (MOVANTIK) 25 MG tablet; Take 1 tablet (25 mg total) by mouth in the morning  -     US abdomen and pelvis with transvaginal; Future  -     Ambulatory referral to Gastroenterology; Future  RTc in 1-2 mos w blood  Work    Heart murmur  -     Echo complete w/ contrast if indicated; Future    Screening mammogram for breast cancer  -     Mammo screening bilateral w 3d & cad; Future    Generalized abdominal pain  -     US abdomen and pelvis with transvaginal; Future  -     Ambulatory referral to Gastroenterology; Future    Pelvic pain  -     US abdomen and pelvis with transvaginal; Future    Lumbar radiculopathy  -     gabapentin (Neurontin) 100 mg capsule; Take 1 capsule (100 mg total) by mouth 2 (two) times a day With food/Meals  -     EMG 2 limb lower extremity; Future  -     XR spine lumbar minimum 4 views non injury; Future  -     MRI lumbar spine wo contrast; Future    Weakness of left lower extremity  -     gabapentin (Neurontin) 100 mg capsule; Take 1 capsule (100 mg total) by mouth 2 (two) times a day With food/Meals  -     EMG 2 limb lower extremity; Future  -     XR spine lumbar minimum 4 views non injury; Future  -     MRI lumbar spine wo contrast; Future    Pain of left calf; ? ?Due to Sciatica pain Vs DVT :  STAT;  -     VAS lower limb venous duplex study, unilateral/limited; Future    RTC in 3-4 weeks    Subjective:      Patient ID: Ricci Pritchard is a 56 y o  female  [id number] is here for Regular check up, she has few symptoms, recent blood work and med list reviewed w Pt in detail    The following portions of the patient's history were reviewed and updated as appropriate: allergies, past family history, past medical history, past social history, past surgical history and problem list     Review of Systems   Constitutional: Negative for chills, fatigue and fever  HENT: Negative for congestion, facial swelling, sore throat, trouble swallowing and voice change  Eyes: Negative for pain, discharge and visual disturbance  Respiratory: Negative for cough, shortness of breath and wheezing  Cardiovascular: Negative for chest pain, palpitations and leg swelling  Gastrointestinal: Positive for abdominal pain  Negative for blood in stool, constipation, diarrhea and nausea  Endocrine: Negative for polydipsia, polyphagia and polyuria  Genitourinary: Negative for difficulty urinating, hematuria and urgency  Musculoskeletal: Positive for back pain  Negative for arthralgias and myalgias  Skin: Negative for rash  Neurological: Positive for dizziness, weakness and numbness  Negative for tremors and headaches  Hematological: Negative for adenopathy  Does not bruise/bleed easily  Psychiatric/Behavioral: Negative for dysphoric mood, sleep disturbance and suicidal ideas  Objective:      /82 (BP Location: Left arm, Patient Position: Sitting, Cuff Size: Standard)   Pulse 94   Temp 98 4 °F (36 9 °C)   Resp 16   Ht 5' 5" (1 651 m)   Wt 87 3 kg (192 lb 6 4 oz)   SpO2 98%   BMI 32 02 kg/m²          Physical Exam  Constitutional:       General: She is not in acute distress  HENT:      Head: Normocephalic  Mouth/Throat:      Pharynx: No oropharyngeal exudate  Eyes:      General: No scleral icterus  Conjunctiva/sclera: Conjunctivae normal       Pupils: Pupils are equal, round, and reactive to light  Neck:      Thyroid: No thyromegaly  Cardiovascular:      Rate and Rhythm: Normal rate and regular rhythm  Heart sounds: Normal heart sounds  No murmur heard  Pulmonary:      Effort: Pulmonary effort is normal  No respiratory distress  Breath sounds: Normal breath sounds  No wheezing or rales  Abdominal:      General: Bowel sounds are normal  There is no distension  Palpations: Abdomen is soft  Tenderness:  There is abdominal tenderness  There is no guarding or rebound  Musculoskeletal:         General: Swelling and tenderness present  Cervical back: Neck supple  Lymphadenopathy:      Cervical: No cervical adenopathy  Skin:     Coloration: Skin is not pale  Findings: No rash  Neurological:      Mental Status: She is alert and oriented to person, place, and time  Sensory: Sensory deficit present  Motor: Weakness present

## 2022-03-24 ENCOUNTER — HOSPITAL ENCOUNTER (OUTPATIENT)
Dept: NON INVASIVE DIAGNOSTICS | Facility: CLINIC | Age: 47
Discharge: HOME | End: 2022-03-24
Payer: MEDICARE

## 2022-03-24 VITALS
HEART RATE: 65 BPM | SYSTOLIC BLOOD PRESSURE: 152 MMHG | WEIGHT: 192 LBS | HEIGHT: 65 IN | DIASTOLIC BLOOD PRESSURE: 82 MMHG | BODY MASS INDEX: 31.99 KG/M2

## 2022-03-24 DIAGNOSIS — M79.662 PAIN OF LEFT CALF: ICD-10-CM

## 2022-03-24 DIAGNOSIS — R01.1 HEART MURMUR: ICD-10-CM

## 2022-03-24 LAB
AORTIC ROOT: 2.6 CM
APICAL FOUR CHAMBER EJECTION FRACTION: 65 %
ASCENDING AORTA: 2.9 CM (ref 2.04–3.06)
E WAVE DECELERATION TIME: 200 MS
FRACTIONAL SHORTENING: 33 % (ref 28–44)
INTERVENTRICULAR SEPTUM IN DIASTOLE (PARASTERNAL SHORT AXIS VIEW): 0.9 CM
INTERVENTRICULAR SEPTUM: 0.9 CM (ref 0.54–1)
LAAS-AP2: 10.5 CM2
LAAS-AP4: 9.3 CM2
LEFT ATRIUM AREA SYSTOLE SINGLE PLANE A4C: 9.1 CM2
LEFT ATRIUM SIZE: 3.1 CM
LEFT INTERNAL DIMENSION IN SYSTOLE: 3 CM (ref 3–4.54)
LEFT VENTRICLE DIASTOLIC VOLUME (MOD BIPLANE): 99 ML (ref 95.75–215.64)
LEFT VENTRICLE SYSTOLIC VOLUME (MOD BIPLANE): 37 ML
LEFT VENTRICULAR INTERNAL DIMENSION IN DIASTOLE: 4.5 CM (ref 4.93–7.34)
LEFT VENTRICULAR POSTERIOR WALL IN END DIASTOLE: 0.8 CM (ref 0.52–0.99)
LEFT VENTRICULAR STROKE VOLUME: 59 ML
LV EF: 62 %
LVSV (TEICH): 59 ML
MV E'TISSUE VEL-SEP: 12 CM/S
MV PEAK A VEL: 0.54 M/S
MV PEAK E VEL: 73 CM/S
MV STENOSIS PRESSURE HALF TIME: 58 MS
MV VALVE AREA P 1/2 METHOD: 3.79 CM2
PA SYSTOLIC PRESSURE: 16 MMHG
RIGHT ATRIUM AREA SYSTOLE A4C: 12.8 CM2
RIGHT VENTRICLE ID DIMENSION: 3.1 CM
SL CV LEFT ATRIUM LENGTH A2C: 3.2 CM
SL CV LV DIAS VOL ENDO Z SCORE: -1.89
SL CV PED ECHO LEFT VENTRICLE DIASTOLIC VOLUME (MOD BIPLANE) 2D: 93 ML
SL CV PED ECHO LEFT VENTRICLE SYSTOLIC VOLUME (MOD BIPLANE) 2D: 34 ML
TR MAX PG: 13 MMHG
TR PEAK VELOCITY: 1.8 M/S
TRICUSPID VALVE PEAK REGURGITATION VELOCITY: 1.82 M/S
Z-SCORE OF ASCENDING AORTA: 1.36
Z-SCORE OF INTERVENTRICULAR SEPTUM IN END DIASTOLE: 1.09
Z-SCORE OF LEFT VENTRICULAR DIMENSION IN END DIASTOLE: -2.9
Z-SCORE OF LEFT VENTRICULAR DIMENSION IN END SYSTOLE: -1.63
Z-SCORE OF LEFT VENTRICULAR POSTERIOR WALL IN END DIASTOLE: 0.36

## 2022-03-24 PROCEDURE — 93306 TTE W/DOPPLER COMPLETE: CPT | Performed by: INTERNAL MEDICINE

## 2022-03-24 PROCEDURE — 93306 TTE W/DOPPLER COMPLETE: CPT

## 2022-03-24 PROCEDURE — 93971 EXTREMITY STUDY: CPT | Performed by: SURGERY

## 2022-03-24 PROCEDURE — 93971 EXTREMITY STUDY: CPT

## 2022-03-30 ENCOUNTER — HOSPITAL ENCOUNTER (OUTPATIENT)
Dept: RADIOLOGY | Facility: IMAGING CENTER | Age: 47
Discharge: HOME | End: 2022-03-30
Payer: MEDICARE

## 2022-03-30 ENCOUNTER — TELEPHONE (OUTPATIENT)
Dept: FAMILY MEDICINE CLINIC | Facility: CLINIC | Age: 47
End: 2022-03-30

## 2022-03-30 ENCOUNTER — TELEPHONE (OUTPATIENT)
Dept: RADIOLOGY | Facility: IMAGING CENTER | Age: 47
End: 2022-03-30

## 2022-03-30 VITALS — WEIGHT: 190 LBS | HEIGHT: 65 IN | BODY MASS INDEX: 31.65 KG/M2

## 2022-03-30 DIAGNOSIS — R10.2 PELVIC PAIN: ICD-10-CM

## 2022-03-30 DIAGNOSIS — K59.09 OTHER CONSTIPATION: ICD-10-CM

## 2022-03-30 DIAGNOSIS — Z12.31 SCREENING MAMMOGRAM FOR BREAST CANCER: ICD-10-CM

## 2022-03-30 DIAGNOSIS — R10.84 GENERALIZED ABDOMINAL PAIN: ICD-10-CM

## 2022-03-30 DIAGNOSIS — M54.16 LUMBAR RADICULOPATHY: ICD-10-CM

## 2022-03-30 DIAGNOSIS — R29.898 WEAKNESS OF LEFT LOWER EXTREMITY: ICD-10-CM

## 2022-03-30 PROCEDURE — 72110 X-RAY EXAM L-2 SPINE 4/>VWS: CPT

## 2022-03-30 PROCEDURE — 76700 US EXAM ABDOM COMPLETE: CPT

## 2022-03-30 PROCEDURE — 77063 BREAST TOMOSYNTHESIS BI: CPT

## 2022-03-30 PROCEDURE — 77067 SCR MAMMO BI INCL CAD: CPT

## 2022-04-11 DIAGNOSIS — F41.0 PANIC ATTACKS: ICD-10-CM

## 2022-04-11 RX ORDER — PROMETHAZINE HYDROCHLORIDE 25 MG/1
25 TABLET ORAL
Qty: 30 TABLET | Refills: 2 | Status: SHIPPED | OUTPATIENT
Start: 2022-04-11

## 2022-04-15 DIAGNOSIS — F41.0 GENERALIZED ANXIETY DISORDER WITH PANIC ATTACKS: ICD-10-CM

## 2022-04-15 DIAGNOSIS — F41.1 GENERALIZED ANXIETY DISORDER WITH PANIC ATTACKS: ICD-10-CM

## 2022-04-18 RX ORDER — ALPRAZOLAM 1 MG/1
1 TABLET ORAL 3 TIMES DAILY PRN
Qty: 90 TABLET | Refills: 0 | OUTPATIENT
Start: 2022-04-18

## 2022-04-18 NOTE — TELEPHONE ENCOUNTER
As per the [facility] website, it appears she filled her Xanax script of 4/16/22 which would mean that she is not currently due for new script  As such, this script request will be blocked

## 2022-05-13 DIAGNOSIS — F41.0 GENERALIZED ANXIETY DISORDER WITH PANIC ATTACKS: ICD-10-CM

## 2022-05-13 DIAGNOSIS — F41.1 GENERALIZED ANXIETY DISORDER WITH PANIC ATTACKS: ICD-10-CM

## 2022-05-13 RX ORDER — ALPRAZOLAM 1 MG/1
1 TABLET ORAL 3 TIMES DAILY PRN
Qty: 90 TABLET | Refills: 0 | Status: SHIPPED | OUTPATIENT
Start: 2022-05-13 | End: 2022-06-10

## 2022-05-13 NOTE — TELEPHONE ENCOUNTER
PDMP website reviewed  Samantha Bingham has been appropriately adherent to controlled psychotropic medications without evidence of abuse or misuse  As such, will send 30-day refill to pharmacy of choice and follow up as necessary

## 2022-05-17 ENCOUNTER — OFFICE VISIT (OUTPATIENT)
Dept: URGENT CARE | Age: 47
End: 2022-05-17
Payer: MEDICARE

## 2022-05-17 VITALS
RESPIRATION RATE: 16 BRPM | DIASTOLIC BLOOD PRESSURE: 72 MMHG | OXYGEN SATURATION: 99 % | TEMPERATURE: 97.6 F | SYSTOLIC BLOOD PRESSURE: 118 MMHG | HEART RATE: 117 BPM

## 2022-05-17 DIAGNOSIS — R35.0 URINARY FREQUENCY: Primary | ICD-10-CM

## 2022-05-17 LAB
SL AMB  POCT GLUCOSE, UA: NEGATIVE
SL AMB LEUKOCYTE ESTERASE,UA: ABNORMAL
SL AMB POCT BILIRUBIN,UA: NEGATIVE
SL AMB POCT BLOOD,UA: ABNORMAL
SL AMB POCT CLARITY,UA: ABNORMAL
SL AMB POCT COLOR,UA: YELLOW
SL AMB POCT KETONES,UA: NEGATIVE
SL AMB POCT NITRITE,UA: NEGATIVE
SL AMB POCT PH,UA: 6
SL AMB POCT SPECIFIC GRAVITY,UA: 1.03
SL AMB POCT URINE PROTEIN: 100
SL AMB POCT UROBILINOGEN: 0.2

## 2022-05-17 PROCEDURE — 99213 OFFICE O/P EST LOW 20 MIN: CPT

## 2022-05-17 PROCEDURE — 81002 URINALYSIS NONAUTO W/O SCOPE: CPT

## 2022-05-17 RX ORDER — NITROFURANTOIN 25; 75 MG/1; MG/1
100 CAPSULE ORAL 2 TIMES DAILY
Qty: 10 CAPSULE | Refills: 0 | Status: SHIPPED | OUTPATIENT
Start: 2022-05-17 | End: 2022-05-22

## 2022-05-17 RX ORDER — PHENAZOPYRIDINE HYDROCHLORIDE 200 MG/1
200 TABLET, FILM COATED ORAL
Qty: 6 TABLET | Refills: 0 | Status: SHIPPED | OUTPATIENT
Start: 2022-05-17 | End: 2022-05-19

## 2022-05-17 NOTE — PATIENT INSTRUCTIONS
Urinary Tract Infection in Women   Your urinalysis showed a small amount of leukocytes (white blood cells) along with blood  Start antibiotic for presumed urinary tract infection  We will send the urine for culture  If symptoms worsen ie back pain, fevers, chills, vickie blood in the urine - proceed to ER  Follow up with PCP in 1-2 days  AMBULATORY CARE:   A urinary tract infection (UTI)  is caused by bacteria that get inside your urinary tract  Most bacteria that enter your urinary tract come out when you urinate  If the bacteria stay in your urinary tract, you may get an infection  Your urinary tract includes your kidneys, ureters, bladder, and urethra  Urine is made in your kidneys, and it flows from the ureters to the bladder  Urine leaves the bladder through the urethra  A UTI is more common in your lower urinary tract, which includes your bladder and urethra  Common symptoms include the following:   Urinating more often or waking from sleep to urinate    Pain or burning when you urinate    Pain or pressure in your lower abdomen     Urine that smells bad    Blood in your urine    Leaking urine    Seek care immediately if:   You are urinating very little or not at all  You have a high fever with shaking chills  You have side or back pain that gets worse  Call your doctor if:   You have a fever  You do not feel better after 2 days of taking antibiotics  You are vomiting  You have questions or concerns about your condition or care  Treatment for a UTI  may include antibiotics to treat a bacterial infection  You may also need medicines to decrease pain and burning, or decrease the urge to urinate often  If you have UTIs often (called recurrent UTIs), you may be given antibiotics to take regularly  You will be given directions for when and how to use antibiotics  The goal is to prevent UTIs but not cause antibiotic resistance by using antibiotics too often    Prevent a UTI:   Empty your bladder often  Urinate and empty your bladder as soon as you feel the need  Do not hold your urine for long periods of time  Wipe from front to back after you urinate or have a bowel movement  This will help prevent germs from getting into your urinary tract through your urethra  Drink liquids as directed  Ask how much liquid to drink each day and which liquids are best for you  You may need to drink more liquids than usual to help flush out the bacteria  Do not drink alcohol, caffeine, or citrus juices  These can irritate your bladder and increase your symptoms  Your healthcare provider may recommend cranberry juice to help prevent a UTI  Urinate after you have sex  This can help flush out bacteria passed during sex  Do not douche or use feminine deodorants  These can change the chemical balance in your vagina  Change sanitary pads or tampons often  This will help prevent germs from getting into your urinary tract  Talk to your healthcare provider about your birth control method  You may need to change your method if it is increasing your risk for UTIs  Wear cotton underwear and clothes that are loose  Tight pants and nylon underwear can trap moisture and cause bacteria to grow  Vaginal estrogen may be recommended  This medicine helps prevent UTIs in women who have gone through menopause or are in sindy-menopause  Do pelvic muscle exercises often  Pelvic muscle exercises may help you start and stop urinating  Strong pelvic muscles may help you empty your bladder easier  Squeeze these muscles tightly for 5 seconds like you are trying to hold back urine  Then relax for 5 seconds  Gradually work up to squeezing for 10 seconds  Do 3 sets of 15 repetitions a day, or as directed  Follow up with your doctor as directed:  Write down your questions so you remember to ask them during your visits     © Copyright [employer] 2022 Information is for End User's use only and may not be sold, redistributed or otherwise used for commercial purposes  All illustrations and images included in CareNotes® are the copyrighted property of [employer]  or Jamie Santos  The above information is an  only  It is not intended as medical advice for individual conditions or treatments  Talk to your doctor, nurse or pharmacist before following any medical regimen to see if it is safe and effective for you

## 2022-05-17 NOTE — PROGRESS NOTES
[facility]        NAME: Inessa Tran is a 56 y o  female  : 1975    MRN: [id number]  DATE: May 17, 2022  TIME: 4:40 PM    Assessment and Plan   Urinary frequency [R35 0]  1  Urinary frequency  POCT urine dip    nitrofurantoin (MACROBID) 100 mg capsule    phenazopyridine (PYRIDIUM) 200 mg tablet         Patient Instructions     UA with sm leuks, lg blood, cloudy  Start antibiotic  Follow up on culture  Follow up with PCP in 1-2 days  Proceed to ER if symptoms worsen ie back pain, fevers, chills, hematuria  Chief Complaint     Chief Complaint   Patient presents with    Possible UTI     Patient reports burning and frequency on urination since yesterday  History of Present Illness     56 y o  F presents with complaints of urinary burning and frequency x 1 day  Denies hematuria, back pain, fevers, chills  Denies hx of kidney stones  Past UXC growing E  Coli  Review of Systems   Review of Systems   Constitutional: Negative for chills, fatigue and fever  HENT: Negative for congestion, ear pain, facial swelling, hearing loss, rhinorrhea, sinus pressure, sneezing, sore throat and trouble swallowing  Eyes: Negative for pain, redness and visual disturbance  Respiratory: Negative for cough, chest tightness, shortness of breath and wheezing  Cardiovascular: Negative for chest pain and palpitations  Gastrointestinal: Negative for abdominal pain, diarrhea, nausea and vomiting  Genitourinary: Negative for dysuria, flank pain, hematuria and pelvic pain  Musculoskeletal: Negative for arthralgias, back pain and myalgias  Skin: Negative for color change and rash  Neurological: Negative for dizziness, seizures, syncope, weakness, light-headedness and headaches  Psychiatric/Behavioral: Negative for confusion, hallucinations and sleep disturbance  The patient is not nervous/anxious  All other systems reviewed and are negative          Current Medications       Current Outpatient Medications:     ALPRAZolam (XANAX) 1 mg tablet, Take 1 tablet (1 mg total) by mouth as needed in the morning and 1 tablet (1 mg total) as needed at noon and 1 tablet (1 mg total) as needed in the evening for anxiety  , Disp: 90 tablet, Rfl: 0    medroxyPROGESTERone (DEPO-PROVERA) 150 mg/mL injection, Inject 1 mL (150 mg total) into a muscle every 3 (three) months, Disp: 1 mL, Rfl: 5    nitrofurantoin (MACROBID) 100 mg capsule, Take 1 capsule (100 mg total) by mouth in the morning and 1 capsule (100 mg total) in the evening  Do all this for 5 days  , Disp: 10 capsule, Rfl: 0    phenazopyridine (PYRIDIUM) 200 mg tablet, Take 1 tablet (200 mg total) by mouth in the morning and 1 tablet (200 mg total) at noon and 1 tablet (200 mg total) in the evening  Take with meals  Do all this for 2 days  , Disp: 6 tablet, Rfl: 0    promethazine (PHENERGAN) 25 mg tablet, Take 1 tablet (25 mg total) by mouth daily at bedtime, Disp: 30 tablet, Rfl: 2    Buprenorphine HCl-Naloxone HCl 8 6-2 1 MG SUBL, , Disp: , Rfl:     gabapentin (Neurontin) 100 mg capsule, Take 1 capsule (100 mg total) by mouth 2 (two) times a day With food/Meals, Disp: 60 capsule, Rfl: 2    naloxegol oxalate (MOVANTIK) 25 MG tablet, Take 1 tablet (25 mg total) by mouth in the morning, Disp: 30 tablet, Rfl: 2    traZODone (DESYREL) 50 mg tablet, Take 1 tablet (50 mg total) by mouth daily at bedtime as needed for sleep (Patient not taking: Reported on 5/17/2022), Disp: 30 tablet, Rfl: 2    Current Facility-Administered Medications:     medroxyPROGESTERone acetate (DEPO-PROVERA SYRINGE) IM injection 150 mg, 150 mg, Intramuscular, Q3 Months, HILARIO Lentz, 150 mg at 03/22/22 1054    Current Allergies     Allergies as of 05/17/2022 - Reviewed 05/17/2022   Allergen Reaction Noted    Ciprofloxacin Anaphylaxis 01/07/2019    Codeine Lightheadedness 08/28/2001    Morphine Lightheadedness 08/28/2001    Sulfa antibiotics Anaphylaxis and Rash 09/28/2017    Tramadol Lightheadedness 09/28/2017            The following portions of the patient's history were reviewed and updated as appropriate: allergies, current medications, past family history, past medical history, past social history, past surgical history and problem list      Past Medical History:   Diagnosis Date    Addiction [facility] drug ([facility] )     Anxiety     Arthritis     Depression     Endometriosis     no current issues    Infectious viral hepatitis     history of hep c    Irritable bowel syndrome     Substance abuse ([facility] )        Past Surgical History:   Procedure Laterality Date    APPENDECTOMY      CHOLECYSTECTOMY      HEMORRHOID SURGERY         Family History   Problem Relation Age of Onset    Cancer Father     Drug abuse Father     Drug abuse Mother     No Known Problems Sister     No Known Problems Daughter     No Known Problems Maternal Grandmother     Breast cancer Maternal Grandfather [de-identified]    Prostate cancer Maternal Grandfather     Melanoma Maternal Grandfather     Colon cancer Maternal Grandfather 79    No Known Problems Paternal Grandmother     No Known Problems Paternal Grandfather     No Known Problems Daughter     No Known Problems Maternal Aunt          Medications have been verified  Objective   /72   Pulse (!) 117   Temp 97 6 °F (36 4 °C)   Resp 16   SpO2 99%   No LMP recorded  Patient has had an injection  Physical Exam     Physical Exam  Vitals reviewed  Constitutional:       General: She is not in acute distress  Appearance: Normal appearance  She is not toxic-appearing  HENT:      Head: Normocephalic  Right Ear: Tympanic membrane normal  Tympanic membrane is not erythematous or bulging  Left Ear: Tympanic membrane normal  Tympanic membrane is not erythematous or bulging  Nose: No congestion or rhinorrhea  Right Sinus: No maxillary sinus tenderness or frontal sinus tenderness        Left Sinus: No maxillary sinus tenderness or frontal sinus tenderness  Mouth/Throat:      Pharynx: Uvula midline  No oropharyngeal exudate, posterior oropharyngeal erythema or uvula swelling  Tonsils: No tonsillar exudate or tonsillar abscesses  Eyes:      Extraocular Movements: Extraocular movements intact  Conjunctiva/sclera: Conjunctivae normal       Pupils: Pupils are equal, round, and reactive to light  Cardiovascular:      Rate and Rhythm: Normal rate and regular rhythm  Pulses: Normal pulses  Heart sounds: Normal heart sounds  Pulmonary:      Effort: Pulmonary effort is normal  No tachypnea or respiratory distress  Breath sounds: Normal breath sounds and air entry  No decreased breath sounds, wheezing, rhonchi or rales  Abdominal:      General: Bowel sounds are normal  There is no distension  Palpations: Abdomen is soft  Tenderness: There is no abdominal tenderness  There is no right CVA tenderness, left CVA tenderness or guarding  Musculoskeletal:         General: Normal range of motion  Cervical back: Normal range of motion  Lymphadenopathy:      Cervical: No cervical adenopathy  Skin:     General: Skin is warm and dry  Neurological:      General: No focal deficit present  Mental Status: She is alert  Cranial Nerves: Cranial nerves are intact  Sensory: Sensation is intact  Motor: Motor function is intact  Coordination: Coordination is intact  Gait: Gait is intact  Deep Tendon Reflexes: Reflexes are normal and symmetric

## 2022-05-24 ENCOUNTER — HOSPITAL ENCOUNTER (OUTPATIENT)
Dept: NEUROLOGY | Facility: CLINIC | Age: 47
Discharge: HOME | End: 2022-05-24
Payer: MEDICARE

## 2022-05-24 DIAGNOSIS — M54.16 LUMBAR RADICULOPATHY: ICD-10-CM

## 2022-05-24 DIAGNOSIS — R29.898 WEAKNESS OF LEFT LOWER EXTREMITY: ICD-10-CM

## 2022-05-24 PROCEDURE — 95910 NRV CNDJ TEST 7-8 STUDIES: CPT | Performed by: PSYCHIATRY & NEUROLOGY

## 2022-05-24 PROCEDURE — 95886 MUSC TEST DONE W/N TEST COMP: CPT | Performed by: PSYCHIATRY & NEUROLOGY

## 2022-05-25 DIAGNOSIS — R94.131 ABNORMAL EMG: Primary | ICD-10-CM

## 2022-05-25 DIAGNOSIS — M54.16 LUMBAR RADICULOPATHY: ICD-10-CM

## 2022-06-01 ENCOUNTER — TELEMEDICINE (OUTPATIENT)
Dept: PSYCHIATRY | Facility: CLINIC | Age: 47
End: 2022-06-01
Payer: COMMERCIAL

## 2022-06-01 DIAGNOSIS — F51.01 PRIMARY INSOMNIA: ICD-10-CM

## 2022-06-01 DIAGNOSIS — F41.1 GENERALIZED ANXIETY DISORDER WITH PANIC ATTACKS: Primary | ICD-10-CM

## 2022-06-01 DIAGNOSIS — F41.0 GENERALIZED ANXIETY DISORDER WITH PANIC ATTACKS: Primary | ICD-10-CM

## 2022-06-01 PROCEDURE — 99214 OFFICE O/P EST MOD 30 MIN: CPT | Performed by: STUDENT IN AN ORGANIZED HEALTH CARE EDUCATION/TRAINING PROGRAM

## 2022-06-01 NOTE — BH TREATMENT PLAN
TREATMENT PLAN (Medication Management Only)        [facility]    Name and Date of Birth:  Celia Real 56 y o  1975  Date of Treatment Plan: June 1, 2022  Diagnosis/Diagnoses:    1  Generalized anxiety disorder with panic attacks    2  Primary insomnia      Strengths/Personal Resources for Self-Care: supportive family, supportive friends, taking medications as prescribed, ability to adapt to life changes, ability to communicate needs, ability to communicate well, ability to listen, ability to reason, general fund of knowledge, good physical health, good understanding of illness, independence, motivation for treatment, ability to negotiate basic needs, being resoureceful, self-reliance, sense of humor, special hobby/interest, willingness to work on problems  Area/Areas of need (in own words): anxiety symptoms  1  Long Term Goal: maintain control of anxiety  Target Date:6 months - 12/1/2022  Person/Persons responsible for completion of goal: Shereen  2  Short Term Objective (s) - How will we reach this goal?:   A  Provider new recommended medication/dosage changes and/or continue medication(s): continue current medications as prescribed  B  Attend medication management appointments regularly  C  Take psychiatric medications responsibly  D  Maintain sobriety   Christianne severino in August F  Travel back to [place]  Target Date:6 months - 12/1/2022  Person/Persons Responsible for Completion of Goal: Shereen  Progress Towards Goals: stable, continuing treatment  Treatment Modality: medication management every 4 months  Review due 180 days from date of this plan: 6 months - 12/1/2022  Expected length of service: ongoing treatment  My Physician/PA/NP and I have developed this plan together and I agree to work on the goals and objectives  I understand the treatment goals that were developed for my treatment  Treatment Plan completed with assistance and input from patient and verbal consent provided  Treatment plan was not signed at time of office visit secondary to COVID-19 social distancing guidelines

## 2022-06-01 NOTE — PSYCH
MEDICATION MANAGEMENT NOTE        Merged with [facility]      Name and Date of Birth:  Hiren Zhang 56 y o  1975 MRN: [id number]    Date of Visit: June 1, 2022    Reason for Visit: Follow-up visit for medication management     Virtual Visit Disclaimer:       TeleMed provider: Marta Louis MD    Location: at [address] [facility] [address] in [place], [place], [facility]    Verification of patient location:     Patient is currently located in the [facility]  Patient is currently located in a state in which I am licensed     After connecting through [employer], the patient was identified by name and date of birth  Hiren Zhang was informed that this is a telemedicine visit that is being conducted through [address] [place] [address] Now, and the patient was informed that this is a secure, HIPAA-compliant platform  My office door was closed  No one else was in the room  Hiren Zhang acknowledged consent and understanding of privacy and security of the video platform  Natalia Good understands that the online visit is based solely on information provided by the patient, and that, in the absence of a face-to-face physical evaluation by the physician, the diagnosis Natalia Good  receives is both limited and provisional in terms of accuracy and completeness  Hiren Zhang understands that they can discontinue the visit at any time  I informed Natalia Good that I have reviewed their record in EPIC and presented the opportunity for them to ask any questions regarding the visit today  Hiren Zhang voiced understanding and consented to these terms  Natalia Good is aware this is a billable service      SUBJECTIVE:    Hiren Zhang is a 56 y o  female with past psychiatric history significant for generalized anxiety disorder with panic attacks, opiate dependence (Rx Zubsolv via [facility]), insomnia, and extensive history of polysubstance abuse (cannabis, hallucinogens, cocaine) who was personally seen and evaluated today at the [address] [place] [address] E outpatient clinic for follow-up and medication management  Margarita Khan presents as calm, pleasant, and cooperative  Her thoughts are organized and linear  She completes assessment without difficulty  Shereen endorses compliance with psychotropic medication regimen consisting of Xanax and PRN Trazodone  She denies adverse medication side effects  PDMP website reviewed, no acute concerns for abuse or misuse  Margarita Khan states that she currently feels physically unwell  She reports fatigue, lethargy, cough, and muscle aches  Her COVID test is pending  She also reports uptick in chronic pain ("sciatica") and is currently engaged in PT, suggestive of self-preservation  Margarita Khan speaks today regarding her daughter's pregnancy and ways she feels that they have reestablished trust within their relationship  She is set to babysit the child after birth in August for "4 days" without her daughters presence  She reports excitement regarding this  Margarita Khan currently reports healthy appetite and sleep  She is without SI/HI  She denies crying spells or anhedonia  She returned from a trip to [facility] in August and found it to be pleasurable  Her focus/concentration is at baseline  She is future-oriented, discussing plans to obtain dental implants but her previous physician has left the practice so now she is awaiting lists of providers from her insurance company  Supportive therapy provided  Margarita Khan denies pathologic worry or nervousness at the moment  She is utilizing PRN Xanax without incident  Margarita Khan is not tense, on-edge, or restless  During today's examination, Margarita Khan does not exhibit objective evidence of j carlos/hypomania or vickie psychosis  Margarita Khan denies recent ETOH or illicit substance abuse  Given upcoming birth of grandchild and physical illness, will defer further tapering of Xanax until next visit  She was agreeable to this  She offers no further concerns         Current Rating Scores: None completed today  Review Of Systems:      Constitutional feeling poorly, feeling tired, low energy and as noted in HPI   ENT dizziness   Cardiovascular negative   Respiratory cough   Gastrointestinal negative   Genitourinary negative   Musculoskeletal myalgias and joint pain   Integumentary negative   Neurological headache   Endocrine negative   Other Symptoms none, all other systems are negative       Past Psychiatric History: (unchanged information from previous note copied and italicized) - Information that is bolded has been updated       Inpatient psychiatric admissions: Numerous past psychiatric admissions - last in 2017 at [facility] for depression, SI in context of drug use  Prior outpatient psychiatric linkage: Numerous psychiatrists in the past - Last linkage with [facility] via [facility]  Past/current psychotherapy: History of psychotherapy linkage, no current therapists  History of suicidal attempts/gestures: Numerous attempts in the past including overdose and "cutting wrists"  History of violence/aggressive behaviors: Denies  Psychotropic medication trials: Lexapro, Prozac, Zoloft, Paxil, Remeron, Trazodone, Wellbutrin, BuSpar, VPA, Lithium, Risperdal, Seroquel, Gabapentin, Propranolol, Clonidine, Valium, Xanax, Klonopin, Adderall, Ativan, Librium, Provigil, Pristiq (too much GI discomfort)    Substance abuse inpatient/outpatient rehabilitation: 10+ rehab/detox admissions - first at the age of 17 in [facility] - last in [facility]     Substance Abuse History: (unchanged information from previous note copied and italicized) - Information that is bolded has been updated      Extensive history of illict substance (cocaine, methamphetamine, cannabis, opiates, hallucinogen), and tobacco abuse  She denies a history of ETOH abuse  No past legal actions yet 1 prior arrest secondary to substance intoxication   The patient denies prior DWIs/DUIs  Extensive history of outpatient/inpatient rehabilitation programs  Shereen does not exhibit objective evidence of substance withdrawal during today's examination nor does Shereen appear under the influence of any psychoactive substance           Social History: (unchanged information from previous note copied and italicized) - Information that is bolded has been updated       Developmental: Denies a history of milestone/developmental delay  Denies a history of in-utero exposure to toxins/illicit substances  There is no documented history of IEP or need for special education    [address]  Marital history:   Living arrangement, social support: , has 2 children (daughter and son)  Occupational History: on permanent disability  Access to firearms: Denies direct access to weapons/firearms  Shereen Paris has no history of arrests or violence with a deadly weapon       Traumatic History: (unchanged information from previous note copied and italicized) - Information that is bolded has been updated       Abuse:none is reported  Other Traumatic Events: Father dying in 2017 was problematic       Past Medical History:    Past Medical History:   Diagnosis Date    Addiction to drug ([facility] )     Anxiety     Arthritis     Depression     Endometriosis     no current issues    Infectious viral hepatitis     history of hep c    Irritable bowel syndrome     Substance abuse ([facility] )      Past Medical History Pertinent Negatives:   Diagnosis Date Noted    Abnormal Pap smear of cervix 02/20/2018     Past Surgical History:   Procedure Laterality Date    APPENDECTOMY      CHOLECYSTECTOMY      HEMORRHOID SURGERY       Allergies   Allergen Reactions    Ciprofloxacin Anaphylaxis     Interacts with Zubsolv    Codeine Lightheadedness     Reaction Date: 07Jun2011;     Morphine Lightheadedness     Reaction Date: 07Jun2011;     Sulfa Antibiotics Anaphylaxis and Rash    Tramadol Lightheadedness       Substance Abuse History:    Social History     Substance and Sexual Activity   Alcohol Use Yes    Comment: rarely/holidays     Social History     Substance and Sexual Activity   Drug Use Not Currently    Types: Heroin, Cocaine       Social History:    Social History     Socioeconomic History    Marital status: /Civil Union     Spouse name: Sierra Douglas Number of children: 2    Years of education: Not on file    Highest education level: Not on file   Occupational History    Occupation: homemaker   Tobacco Use    Smoking status: Former Smoker     Quit date: 3/14/2019     Years since quitting: 3 2    Smokeless tobacco: Never Used   Vaping Use    Vaping Use: Never used   Substance and Sexual Activity    Alcohol use: Yes     Comment: rarely/holidays    Drug use: Not Currently     Types: Heroin, Cocaine    Sexual activity: Yes     Partners: Male     Birth control/protection: Injection   Other Topics Concern    Not on file   Social History Narrative    Not on file     Social Determinants of Health     Financial Resource Strain: Low Risk     Difficulty of Paying Living Expenses: Not hard at all   Food Insecurity: No Food Insecurity    Worried About [address] in the Last Year: Never true    [address] in the Last Year: Never true   Transportation Needs: No Transportation Needs    Lack of Transportation (Medical): No    Lack of Transportation (Non-Medical): No   Physical Activity: Not on file   Stress: No Stress Concern Present    Feeling of Stress :  Only a little   Social Connections: Not on file   Intimate Partner Violence: Not on file   Housing Stability: [address] Unable to Pay for Housing in the Last Year: No    Number of Places Lived in the Last Year: 1    Unstable Housing in the Last Year: No       Family Psychiatric History:     Family History   Problem Relation Age of Onset    Cancer Father     Drug abuse Father     Drug abuse Mother     No Known Problems Sister     No Known Problems Daughter     No Known Problems Maternal Grandmother     Breast cancer Maternal Grandfather [de-identified]    Prostate cancer Maternal Grandfather     Melanoma Maternal Grandfather     Colon cancer Maternal Grandfather 79    No Known Problems Paternal Grandmother     No Known Problems Paternal Grandfather     No Known Problems Daughter     No Known Problems Maternal Aunt        History Review: The following portions of the patient's history were reviewed and updated as appropriate: allergies, current medications, past family history, past medical history, past social history, past surgical history and problem list          OBJECTIVE:     Vital signs in last 24 hours: There were no vitals filed for this visit      Mental Status Evaluation:    Appearance age appropriate, casually dressed, dressed appropriately, looks stated age, tattooed   Behavior pleasant, cooperative, calm   Speech normal rate, normal volume, normal pitch   Mood euthymic   Affect normal range and intensity, appropriate   Thought Processes organized, logical, goal directed, normal rate of thoughts, normal abstract reasoning   Associations intact associations   Thought Content no overt delusions   Perceptual Disturbances: no auditory hallucinations, no visual hallucinations   Abnormal Thoughts  Risk Potential Suicidal ideation - None at present  Homicidal ideation - None  Potential for aggression - No   Orientation oriented to person, place, time/date and situation   Memory recent and remote memory grossly intact   Consciousness alert and awake   Attention Span Concentration Span attention span and concentration are age appropriate   Intellect appears to be of average intelligence   Insight intact and good   Judgement intact and good   Muscle Strength and  Gait unable to assess today due to virtual visit   Motor activity no abnormal movements   Language no difficulty naming common objects   Fund of Knowledge adequate knowledge of current events   Pain mild   Pain Scale Did not rate       Laboratory Results: I have personally reviewed all pertinent laboratory/tests results    Recent Labs (last 4 months):   Office Visit on 05/17/2022   Component Date Value    LEUKOCYTE ESTERASE,UA 05/17/2022 small     NITRITE,UA 05/17/2022 negative     SL AMB POCT UROBILINOGEN 05/17/2022 0 2     POCT URINE PROTEIN 05/17/2022 100      PH,UA 05/17/2022 6 0     BLOOD,UA 05/17/2022 LARGE     SPECIFIC GRAVITY,UA 05/17/2022 1 030     KETONES,UA 05/17/2022 negative     BILIRUBIN,UA 05/17/2022 negative     GLUCOSE, UA 05/17/2022 negative      COLOR,UA 05/17/2022 yellow     CLARITY,UA 05/17/2022 cloudy    Hospital Outpatient Visit on 03/24/2022   Component Date Value    LA size 03/24/2022 3 1     Triscuspid Valve Regurgi* 03/24/2022 13 0     Tricuspid valve peak reg* 03/24/2022 1 82     LVPWd 03/24/2022 0 80     Left Atrium Area-systoli* 03/24/2022 10 5     Left Atrium Area-systoli* 03/24/2022 9 3     MV E' Tissue Velocity Se* 03/24/2022 12     TR Peak Adonay 03/24/2022 1 8     IVSd 03/24/2022 9 37     LV DIASTOLIC VOLUME (MOD* 53/14/0184 93     LEFT VENTRICLE SYSTOLIC * 52/66/2207 34     Left ventricular stroke * 03/24/2022 59 00     EF 03/24/2022 62     A4C EF 03/24/2022 65     LA length (A2C) 03/24/2022 3 20     LVIDd 03/24/2022 4 50     IVS 03/24/2022 0 9     LVIDS 03/24/2022 3 00     FS 03/24/2022 33     Asc Ao 03/24/2022 2 9     Ao root 03/24/2022 2 60     RVID d 03/24/2022 3 1     MV valve area p 1/2 meth* 03/24/2022 3 79     E wave deceleration time 03/24/2022 200     MV Peak E Adonay 03/24/2022 73     MV Peak A Adonay 03/24/2022 8 43     LV Systolic Volume (BP) 54/89/7106 37     LV Diastolic Volume (BP) 69/66/7285 99     DELMY A4C 03/24/2022 9 1     RAA A4C 03/24/2022 12 8     MV stenosis pressure 1/2* 03/24/2022 58     LVSV, 2D 03/24/2022 59     Ao asc z-score 03/24/2022 1 36     ZLVPWD 03/24/2022 0 36     ZLVIDS 03/24/2022 -1 63     ZLVIDD 03/24/2022 -2 90     ZIVSD 03/24/2022 1 09     LV OREILLY VOL ENDO Z SCORE 03/24/2022 -1 89     PASP 03/24/2022 16 0        Suicide/Homicide Risk Assessment:    The following interventions are recommended: no intervention changes needed      Lethality Statement:    Based on today's assessment and clinical criteria, Yumiko Alvarado contracts for safety and is not an imminent risk of harm to self or others  Outpatient level of care is deemed appropriate at this current time  Ana Garcia understands that if they can no longer contract for safety, they need to call the office or report to their nearest Emergency Room for immediate evaluation  Assessment/Plan:     Shereen Paris is a 56 y o  female with past psychiatric history significant for generalized anxiety disorder with panic attacks, opiate dependence (Rx Zubsolv via [facility]), insomnia, and extensive history of polysubstance abuse (cannabis, hallucinogens, cocaine) who was personally seen and evaluated today at the [address] [place] [address] E outpatient clinic for follow-up and medication management  Shereen endorses a longstanding history of anxiety and episodic depression that served as the catalyst for her substance abuse  She states that her biological parents abused illicit substances and thus, they were emotionally neglectful and inconsistent  At the age of 16, Ana Garcia began using cannabis which ultimately lead to use of numerous illicit substances  Shereen reports placement in 10+ detox/rehabilitation programs throughout the course of her life  Her [facility] rehabilitation program [facility] at age 17 [facility]  She last attended [facility]hab via [facility] in 2017, following the death of her father and and subsequent inpatient admission at [facility]  She endorses sobriety from opiates and other illicit substances for the last 3+ years  She was recently psychiatrically managed via [facility]   Dionisio Gilberto through [facility]   However, given their change in policy regarding concurrent use of opiates and benzodiazepines, Margarita Khan was transferred to [facility] for safer, more appropriate tapering process  Margarita Khan has been treated with benzodiazepines since her early 20's  She was previously on Xanax 6mg Daily (2mg TID) but was recently tapered to 4mg Daily (2mg AM, 1mg afternoon, 1mg QHS) over the last 8 months  PDMP webiste reviewed, no concerns for abuse or misuse  I spoke at length with Margarita Khan about 's policy regarding concurrent use of opiates and benzodiazepines and risks/alternatives to treatment  She voiced understanding regarding the need to taper off Xanax and was agreeable to do so  I also discussed possibility of faster tapering process via detox program at [facility], to which she was resistant given responsibility of caring for her family  Shereen denies most neurovegetative symptomatology suggestive of major depressive disorder  Shereen adamantly denies acute thoughts of suicide or self-harm  Shereen has no plans to harm others  There is a documented history of prior suicidal gestures/suicidal attempts  Shereen admits to overdosing and "cutting her wrists" in the past  She has not harmed herself or attempted to end her life in 4+ years  Shereen endorses a historical struggle with anxiety and symptomatology consistent with generalized anxiety disorder  Shereen reports anxiety that is pathologic in nature  Shereen endorses excessive nervousness, irrational worry, and overt anxiousness  Shereen is not pervasively restless or tense but does report feeling episodically keyed-up and on-edge  Shereen experiences disruption in energy and concentration secondary to anxiety  There is evidence to suggest that Shereen experiences irritability, inability to relax, and disruption in sleep in the past, secondary to pathologic anxiety  Shereen denies new-onset panic symptomatology but does report a history of panic attacks characterized by tremor, SOB, fear of impending doom, and overt anxiousness   She denies maladaptive behaviors  Shereen vehemently denies any acute or chronic history suggestive of an underlying affective (bipolar) organization  Shereen denies historical symptomatology suggestive of an underlying psychotic process  Shereen denies historical symptomatology suggestive of PTSD, OCD, or disordered eating       Today's Plan:     Psychopharmacologically, Shereen reports ongoing compliance with Xanax, Trazodone, and Promethazine without adverse side effects or lethality concern  She believes her sleep and anxiety are well controlled  Given upcoming birth of grandchild and physical illness, will defer further tapering of Xanax until next visit  She was agreeable to this  Risks and side effects of chronic benzodiazepine use discussed with Jayashree Garcia , including risk for falls, sedation, respiratory depression (especially if taken in higher doses than prescribed or if taken together with another sedating substance such as alcohol or opiate medications), as well as risk of addiction (especially if taken more often or at higher doses than prescribed) and withdrawal (if stops abruptly, especially if taken more often or at higher doses) including seizures and death  Karen Quiroz was instructed to not drive or operate heavy machinery while taking benzodiazepines, as the medication can cause increased drowsiness   Shereen verbalized understanding and would like to continue at current dose until next visit         DSM-V Diagnoses:      1 ) Generalized Anxiety Disorder with Panic Attacks   2 ) Opiate dependence, continuous  3 ) Polysubstance abuse by history   4 ) Insomnia        Treatment Recommendations/Precautions:     1 ) Generalized Anxiety Disorder with Panic Attacks   - Continue Xanax 1mg TID - tapering process in effect (was previously on high doses)              - Plan is to ultimately taper off Xanax given concurrent use of Suboxone, however, Karen Quiroz has been on BZ for 20+ years so tapering process will be arduous   - Psychoeducation provided regarding the importance of exercise and healthy dietary choices and their impact on mood, energy, and motivation  - Counseled to avoid ETOH, illict substances, and nicotine secondary to the detrimental effects of these substances on mental and physical health  - Encouraged to engage in non-verbal forms of therapy such as art therapy, music therapy, and mindfulness  - Discussed the bio-psycho-social model to treatment and therapeutic exercises/interventions were attempted to cognitively restructure thoughts     2 ) Opiate dependence, continuous  - Rx [facility]v 8 [facility]       3  ) Polysubstance abuse by history   - Currently sober  - Counseled to avoid ETOH, illict substances, and nicotine secondary to the detrimental effects of these substances on mental and physical health     4 ) Insomnia  - Continue Promethazine 25mg QHS PRN  - Continue Trazadone 25-50mg QHS         Medication management every 4 months  Aware of need to follow up with family physician for medical issues  Aware of 24 hour and weekend coverage for urgent situations accessed by calling [facility] main practice number    Medications Risks/Benefits      Risks, Benefits And Possible Side Effects Of Medications:    Risks, benefits, and possible side effects of medications explained to [facility] including risk of suicidality and serotonin syndrome related to treatment with antidepressants and risks of misuse, abuse or dependence, sedation and respiratory depression related to treatment with benzodiazepine medications  She verbalizes understanding and agreement for treatment      Controlled Medication Discussion:     [facility] has been filling controlled prescriptions on time as prescribed according to [facility]  Discussed with [facility] the risks of sedation, respiratory depression, impairment of ability to drive and potential for abuse and addiction related to treatment with benzodiazepine medications  She understands risk of treatment with benzodiazepine medications, agrees to not drive if feels impaired and agrees to take medications as prescribed  Discussed with [facility] warning on concurrent use of benzodiazepines and opioid medications including sedation, respiratory depression, coma and death  She understands the risk of treatment with benzodiazepines in addition to opioids and wants to continue taking those medications    Psychotherapy Provided:     Individual psychotherapy provided: Yes  Counseling was provided during the session today for 10 minutes  Medications, treatment progress and treatment plan reviewed with Shereen  Medication education provided to [facility]  Supportive therapy provided  Treatment Plan:    Completed and signed during the session: Yes - Treatment Plan done but not signed at time of office visit due to:  Plan reviewed by video and verbal consent given due to [place] social distancing    Note Share Disclaimer:      This note was not shared with the patient due to reasonable likelihood of causing patient harm    Elmo Stephens MD 06/01/22

## 2022-06-10 DIAGNOSIS — F41.0 GENERALIZED ANXIETY DISORDER WITH PANIC ATTACKS: ICD-10-CM

## 2022-06-10 DIAGNOSIS — F41.1 GENERALIZED ANXIETY DISORDER WITH PANIC ATTACKS: ICD-10-CM

## 2022-06-10 RX ORDER — ALPRAZOLAM 1 MG/1
TABLET ORAL
Qty: 90 TABLET | Refills: 0 | Status: SHIPPED | OUTPATIENT
Start: 2022-06-10 | End: 2022-06-13 | Stop reason: SDUPTHER

## 2022-06-10 NOTE — TELEPHONE ENCOUNTER
PDMP website reviewed  Monae Wang has been appropriately adherent to controlled psychotropic medications without evidence of abuse or misuse  As such, will send 30-day refill to pharmacy of choice and follow up as necessary

## 2022-06-13 DIAGNOSIS — F41.0 GENERALIZED ANXIETY DISORDER WITH PANIC ATTACKS: ICD-10-CM

## 2022-06-13 DIAGNOSIS — F41.1 GENERALIZED ANXIETY DISORDER WITH PANIC ATTACKS: ICD-10-CM

## 2022-06-13 RX ORDER — ALPRAZOLAM 1 MG/1
1 TABLET ORAL 3 TIMES DAILY PRN
Qty: 90 TABLET | Refills: 0 | Status: SHIPPED | OUTPATIENT
Start: 2022-06-13 | End: 2022-07-11 | Stop reason: SDUPTHER

## 2022-06-13 NOTE — TELEPHONE ENCOUNTER
Patient called in regarding the status of medication , checking patient chart medication was approved and sent over to [facility]   Writer called [facility] to clarify if medication was received   Alprazolam   Pharmacist didn't received  medication  Refilled of medication

## 2022-06-20 ENCOUNTER — CLINICAL SUPPORT (OUTPATIENT)
Dept: OBGYN CLINIC | Facility: CLINIC | Age: 47
End: 2022-06-20

## 2022-06-20 VITALS
HEART RATE: 83 BPM | BODY MASS INDEX: 31.82 KG/M2 | WEIGHT: 191.2 LBS | SYSTOLIC BLOOD PRESSURE: 121 MMHG | DIASTOLIC BLOOD PRESSURE: 78 MMHG

## 2022-06-20 DIAGNOSIS — Z30.42 ENCOUNTER FOR SURVEILLANCE OF INJECTABLE CONTRACEPTIVE: ICD-10-CM

## 2022-06-20 PROCEDURE — 96372 THER/PROPH/DIAG INJ SC/IM: CPT

## 2022-06-20 RX ORDER — POLYETHYLENE GLYCOL 3350 17 G/17G
POWDER, FOR SOLUTION ORAL
COMMUNITY
Start: 2022-04-11

## 2022-06-20 RX ADMIN — MEDROXYPROGESTERONE ACETATE 150 MG: 150 INJECTION, SUSPENSION INTRAMUSCULAR at 13:37

## 2022-06-20 NOTE — PROGRESS NOTES
Pt here for depo injection  Given in right buttocks      ndc - [id number]    Lot -[id number]  Exp -dec 2025

## 2022-07-11 DIAGNOSIS — F41.1 GENERALIZED ANXIETY DISORDER WITH PANIC ATTACKS: ICD-10-CM

## 2022-07-11 DIAGNOSIS — F41.0 GENERALIZED ANXIETY DISORDER WITH PANIC ATTACKS: ICD-10-CM

## 2022-07-11 RX ORDER — ALPRAZOLAM 1 MG/1
1 TABLET ORAL 3 TIMES DAILY PRN
Qty: 90 TABLET | Refills: 0 | Status: SHIPPED | OUTPATIENT
Start: 2022-07-11 | End: 2022-08-08 | Stop reason: SDUPTHER

## 2022-07-11 NOTE — TELEPHONE ENCOUNTER
PDMP website reviewed  Luis Sena has been appropriately adherent to controlled psychotropic medications without evidence of abuse or misuse  As such, will send 30-day refill to pharmacy of choice and follow up as necessary

## 2022-08-08 DIAGNOSIS — F41.0 GENERALIZED ANXIETY DISORDER WITH PANIC ATTACKS: ICD-10-CM

## 2022-08-08 DIAGNOSIS — F41.1 GENERALIZED ANXIETY DISORDER WITH PANIC ATTACKS: ICD-10-CM

## 2022-08-08 RX ORDER — ALPRAZOLAM 1 MG/1
1 TABLET ORAL 3 TIMES DAILY PRN
Qty: 90 TABLET | Refills: 0 | Status: SHIPPED | OUTPATIENT
Start: 2022-08-08 | End: 2022-09-06 | Stop reason: SDUPTHER

## 2022-08-08 NOTE — TELEPHONE ENCOUNTER
PDMP website reviewed  Elliot Guerrero has been appropriately adherent to controlled psychotropic medications without evidence of abuse or misuse  As such, will send 30-day refill to pharmacy of choice and follow up as necessary

## 2022-08-29 ENCOUNTER — OFFICE VISIT (OUTPATIENT)
Dept: FAMILY MEDICINE CLINIC | Facility: CLINIC | Age: 47
End: 2022-08-29
Payer: MEDICARE

## 2022-08-29 VITALS
HEART RATE: 92 BPM | RESPIRATION RATE: 14 BRPM | WEIGHT: 187.6 LBS | SYSTOLIC BLOOD PRESSURE: 124 MMHG | DIASTOLIC BLOOD PRESSURE: 82 MMHG | OXYGEN SATURATION: 99 % | TEMPERATURE: 98 F | HEIGHT: 65 IN | BODY MASS INDEX: 31.25 KG/M2

## 2022-08-29 DIAGNOSIS — Z00.01 ENCOUNTER FOR GENERAL ADULT MEDICAL EXAMINATION WITH ABNORMAL FINDINGS: ICD-10-CM

## 2022-08-29 DIAGNOSIS — R94.131 ABNORMAL EMG: Primary | ICD-10-CM

## 2022-08-29 DIAGNOSIS — M54.16 LUMBAR RADICULOPATHY: ICD-10-CM

## 2022-08-29 DIAGNOSIS — R29.898 WEAKNESS OF LEFT LOWER EXTREMITY: ICD-10-CM

## 2022-08-29 DIAGNOSIS — K59.09 OTHER CONSTIPATION: ICD-10-CM

## 2022-08-29 LAB — SL AMB POCT HEMOGLOBIN AIC: 5 (ref ?–6.5)

## 2022-08-29 PROCEDURE — 83036 HEMOGLOBIN GLYCOSYLATED A1C: CPT | Performed by: INTERNAL MEDICINE

## 2022-08-29 PROCEDURE — 99214 OFFICE O/P EST MOD 30 MIN: CPT | Performed by: INTERNAL MEDICINE

## 2022-08-29 RX ORDER — GABAPENTIN 100 MG/1
100 CAPSULE ORAL 2 TIMES DAILY
Qty: 60 CAPSULE | Refills: 3 | Status: SHIPPED | OUTPATIENT
Start: 2022-08-29

## 2022-08-29 NOTE — PROGRESS NOTES
Assessment/Plan:         Diagnoses and all orders for this visit:    Abnormal EMG; Pt needs MRI, But according ti [employer] she should do P T  first ??  -     Ambulatory Referral to Physical Therapy; Future  -     MRI lumbar spine wo contrast; Future  Try :  -     gabapentin (Neurontin) 100 mg capsule; Take 1 capsule (100 mg total) by mouth 2 (two) times a day With food/Meals  RTc in 2-3 mos  w Blood work    Lumbar radiculopathy; as above,     -     Ambulatory Referral to Physical Therapy; Future  -     MRI lumbar spine wo contrast; Future  -     gabapentin (Neurontin) 100 mg capsule; Take 1 capsule (100 mg total) by mouth 2 (two) times a day With food/Meals    Weakness of left lower extremity; as above,     -     Ambulatory Referral to Physical Therapy; Future  -     MRI lumbar spine wo contrast; Future  -     gabapentin (Neurontin) 100 mg capsule; Take 1 capsule (100 mg total) by mouth 2 (two) times a day With food/Meals    Other constipation; RTC in 2-3 mos w  :  -     Occult Blood, Fecal Immunochemical; Future  -     H  pylori antigen, stool; Future    Encounter for general adult medical examination with abnormal findings; Done in detail  RTC in 2-3 mos w  :  -     Chronic Hepatitis Panel; Future  -     Ferritin; Future  -     Comprehensive metabolic panel; Future  -     CBC and differential; Future  -     Magnesium; Future  -     UA (URINE) with reflex to Scope; Future  -     Lipid panel; Future  -     TSH, 3rd generation; Future  -     T4, free; Future  -     Vitamin D 25 hydroxy; Future  -     Vitamin B12; Future  -     POCT hemoglobin A1c        Subjective:      Patient ID: Maci Langford is a 56 y o  female  56 Y O lady is here for Regular check Up, she has few symptoms, No recent Blood work, Med list Reviewed w Pt,        The following portions of the patient's history were reviewed and updated as appropriate: allergies, current medications, past family history, past medical history, past social history, past surgical history and problem list     Review of Systems   Constitutional: Negative for chills, fatigue and fever  HENT: Negative for congestion, facial swelling, sore throat, trouble swallowing and voice change  Eyes: Negative for pain, discharge and visual disturbance  Respiratory: Negative for cough, shortness of breath and wheezing  Cardiovascular: Negative for chest pain, palpitations and leg swelling  Gastrointestinal: Positive for constipation  Negative for abdominal pain, blood in stool, diarrhea and nausea  Endocrine: Negative for polydipsia, polyphagia and polyuria  Genitourinary: Negative for difficulty urinating, hematuria and urgency  Musculoskeletal: Negative for arthralgias and myalgias  Skin: Negative for rash  Neurological: Positive for numbness  Negative for dizziness, tremors, weakness and headaches  Hematological: Negative for adenopathy  Does not bruise/bleed easily  Psychiatric/Behavioral: Negative for dysphoric mood, sleep disturbance and suicidal ideas  Objective:      /82 (BP Location: Left arm, Patient Position: Sitting, Cuff Size: Standard)   Pulse 92   Temp 98 °F (36 7 °C)   Resp 14   Ht 5' 5" (1 651 m)   Wt 85 1 kg (187 lb 9 6 oz)   SpO2 99%   BMI 31 22 kg/m²          Physical Exam  Constitutional:       General: She is not in acute distress  HENT:      Head: Normocephalic  Mouth/Throat:      Pharynx: No oropharyngeal exudate  Eyes:      General: No scleral icterus  Conjunctiva/sclera: Conjunctivae normal       Pupils: Pupils are equal, round, and reactive to light  Neck:      Thyroid: No thyromegaly  Cardiovascular:      Rate and Rhythm: Normal rate and regular rhythm  Heart sounds: Normal heart sounds  No murmur heard  Pulmonary:      Effort: Pulmonary effort is normal  No respiratory distress  Breath sounds: Normal breath sounds  No wheezing or rales     Abdominal:      General: Bowel sounds are normal  There is no distension  Palpations: Abdomen is soft  Tenderness: There is no abdominal tenderness  There is no guarding or rebound  Musculoskeletal:         General: No tenderness  Cervical back: Neck supple  Lymphadenopathy:      Cervical: No cervical adenopathy  Skin:     Coloration: Skin is not pale  Findings: No rash  Neurological:      Mental Status: She is alert and oriented to person, place, and time  Sensory: Sensory deficit present  Motor: No weakness

## 2022-09-01 ENCOUNTER — ANESTHESIA EVENT (OUTPATIENT)
Dept: RADIOLOGY | Facility: HOSPITAL | Age: 47
End: 2022-09-01

## 2022-09-01 NOTE — PRE-PROCEDURE INSTRUCTIONS
Pre-Surgery Instructions:   Medication Instructions    ALPRAZolam (XANAX) 1 mg tablet Uses PRN- OK to take day of surgery    Buprenorphine HCl-Naloxone HCl 8 6-2 1 MG SUBL Take day of surgery      medroxyPROGESTERone (DEPO-PROVERA) 150 mg/mL injection Not due    traZODone (DESYREL) 50 mg tablet Take night before surgery

## 2022-09-06 ENCOUNTER — CLINICAL SUPPORT (OUTPATIENT)
Dept: OBGYN CLINIC | Facility: CLINIC | Age: 47
End: 2022-09-06

## 2022-09-06 VITALS
SYSTOLIC BLOOD PRESSURE: 115 MMHG | HEART RATE: 91 BPM | DIASTOLIC BLOOD PRESSURE: 78 MMHG | BODY MASS INDEX: 31.78 KG/M2 | WEIGHT: 191 LBS

## 2022-09-06 DIAGNOSIS — F41.0 GENERALIZED ANXIETY DISORDER WITH PANIC ATTACKS: ICD-10-CM

## 2022-09-06 DIAGNOSIS — F41.1 GENERALIZED ANXIETY DISORDER WITH PANIC ATTACKS: ICD-10-CM

## 2022-09-06 DIAGNOSIS — Z30.42 ENCOUNTER FOR SURVEILLANCE OF INJECTABLE CONTRACEPTIVE: ICD-10-CM

## 2022-09-06 PROCEDURE — 96372 THER/PROPH/DIAG INJ SC/IM: CPT

## 2022-09-06 RX ORDER — ALPRAZOLAM 1 MG/1
1 TABLET ORAL 3 TIMES DAILY PRN
Qty: 90 TABLET | Refills: 1 | Status: SHIPPED | OUTPATIENT
Start: 2022-09-06

## 2022-09-06 RX ADMIN — MEDROXYPROGESTERONE ACETATE 150 MG: 150 INJECTION, SUSPENSION INTRAMUSCULAR at 01:00

## 2022-09-06 NOTE — PROGRESS NOTES
Pt here for Depo injection given in the right buttocks        [facility]#[id number]  LOT#[id number]   EXP#11/30/2025

## 2022-09-06 NOTE — TELEPHONE ENCOUNTER
PDMP website reviewed  Govind Ryder has been appropriately adherent to controlled psychotropic medications without evidence of abuse or misuse  As such, will send 30-day refill to pharmacy of choice and follow up as necessary

## 2022-09-07 ENCOUNTER — TELEPHONE (OUTPATIENT)
Dept: PSYCHIATRY | Facility: CLINIC | Age: 47
End: 2022-09-07

## 2022-09-07 NOTE — TELEPHONE ENCOUNTER
Received fax from pharmacy via [employer] requesting completion of an already initiated PA request for Alprazolam 1 mg    KEY  [id number]

## 2022-09-07 NOTE — TELEPHONE ENCOUNTER
Completed the already initiated Alprazolam 1 mg tab PA via [employer] for [facility], utilizing the given key and marking it as "EXPEDITED "    Left roberta Regan reviewing the PA submission  Will call her back when a decision is reached  For your review

## 2022-09-08 NOTE — TELEPHONE ENCOUNTER
Received a fax from [facility] approving the Alprazolam 1 mg tab from 9/8/22-9/8/23  Reviewed the PA approval with [facility] pharmacist, and she received a paid claim  Reviewed same with Shereen  For your review  Will scan to [employer]

## 2022-09-21 ENCOUNTER — ANESTHESIA (OUTPATIENT)
Dept: RADIOLOGY | Facility: HOSPITAL | Age: 47
End: 2022-09-21

## 2022-09-21 ENCOUNTER — HOSPITAL ENCOUNTER (OUTPATIENT)
Dept: RADIOLOGY | Facility: HOSPITAL | Age: 47
Discharge: HOME | End: 2022-09-21
Payer: MEDICARE

## 2022-09-21 VITALS
DIASTOLIC BLOOD PRESSURE: 92 MMHG | TEMPERATURE: 98.9 F | OXYGEN SATURATION: 99 % | HEART RATE: 100 BPM | HEIGHT: 65 IN | WEIGHT: 190 LBS | RESPIRATION RATE: 16 BRPM | BODY MASS INDEX: 31.65 KG/M2 | SYSTOLIC BLOOD PRESSURE: 166 MMHG

## 2022-09-21 DIAGNOSIS — M54.16 LUMBAR RADICULOPATHY: ICD-10-CM

## 2022-09-21 DIAGNOSIS — R29.898 WEAKNESS OF LEFT LOWER EXTREMITY: ICD-10-CM

## 2022-09-21 DIAGNOSIS — R94.131 ABNORMAL EMG: ICD-10-CM

## 2022-09-21 LAB
B-HCG SERPL-ACNC: <2 MIU/ML
EXT PREGNANCY TEST URINE: NEGATIVE
EXT. CONTROL: NORMAL

## 2022-09-21 PROCEDURE — 84702 CHORIONIC GONADOTROPIN TEST: CPT | Performed by: NURSE ANESTHETIST, CERTIFIED REGISTERED

## 2022-09-21 PROCEDURE — G1004 CDSM NDSC: HCPCS

## 2022-09-21 PROCEDURE — 81025 URINE PREGNANCY TEST: CPT | Performed by: NURSE ANESTHETIST, CERTIFIED REGISTERED

## 2022-09-21 PROCEDURE — 72148 MRI LUMBAR SPINE W/O DYE: CPT

## 2022-09-21 RX ORDER — SODIUM CHLORIDE, SODIUM LACTATE, POTASSIUM CHLORIDE, CALCIUM CHLORIDE 600; 310; 30; 20 MG/100ML; MG/100ML; MG/100ML; MG/100ML
INJECTION, SOLUTION INTRAVENOUS CONTINUOUS PRN
Status: DISCONTINUED | OUTPATIENT
Start: 2022-09-21 | End: 2022-09-21

## 2022-09-21 RX ORDER — LIDOCAINE HYDROCHLORIDE 10 MG/ML
INJECTION, SOLUTION EPIDURAL; INFILTRATION; INTRACAUDAL; PERINEURAL AS NEEDED
Status: DISCONTINUED | OUTPATIENT
Start: 2022-09-21 | End: 2022-09-21

## 2022-09-21 RX ORDER — SODIUM CHLORIDE, SODIUM LACTATE, POTASSIUM CHLORIDE, CALCIUM CHLORIDE 600; 310; 30; 20 MG/100ML; MG/100ML; MG/100ML; MG/100ML
125 INJECTION, SOLUTION INTRAVENOUS CONTINUOUS
Status: DISCONTINUED | OUTPATIENT
Start: 2022-09-21 | End: 2022-09-22 | Stop reason: HOSPADM

## 2022-09-21 RX ORDER — ONDANSETRON 2 MG/ML
INJECTION INTRAMUSCULAR; INTRAVENOUS AS NEEDED
Status: DISCONTINUED | OUTPATIENT
Start: 2022-09-21 | End: 2022-09-21

## 2022-09-21 RX ORDER — MIDAZOLAM HYDROCHLORIDE 2 MG/2ML
INJECTION, SOLUTION INTRAMUSCULAR; INTRAVENOUS AS NEEDED
Status: DISCONTINUED | OUTPATIENT
Start: 2022-09-21 | End: 2022-09-21

## 2022-09-21 RX ORDER — PROPOFOL 10 MG/ML
INJECTION, EMULSION INTRAVENOUS AS NEEDED
Status: DISCONTINUED | OUTPATIENT
Start: 2022-09-21 | End: 2022-09-21

## 2022-09-21 RX ADMIN — PROPOFOL 200 MG: 10 INJECTION, EMULSION INTRAVENOUS at 09:57

## 2022-09-21 RX ADMIN — MIDAZOLAM 2 MG: 1 INJECTION INTRAMUSCULAR; INTRAVENOUS at 10:01

## 2022-09-21 RX ADMIN — SODIUM CHLORIDE, SODIUM LACTATE, POTASSIUM CHLORIDE, AND CALCIUM CHLORIDE 125 ML/HR: .6; .31; .03; .02 INJECTION, SOLUTION INTRAVENOUS at 08:37

## 2022-09-21 RX ADMIN — SODIUM CHLORIDE, SODIUM LACTATE, POTASSIUM CHLORIDE, AND CALCIUM CHLORIDE: .6; .31; .03; .02 INJECTION, SOLUTION INTRAVENOUS at 09:46

## 2022-09-21 RX ADMIN — LIDOCAINE HYDROCHLORIDE 50 MG: 10 INJECTION, SOLUTION EPIDURAL; INFILTRATION; INTRACAUDAL; PERINEURAL at 09:57

## 2022-09-21 RX ADMIN — ONDANSETRON 4 MG: 2 INJECTION INTRAMUSCULAR; INTRAVENOUS at 09:57

## 2022-09-21 NOTE — ANESTHESIA POSTPROCEDURE EVALUATION
Post-Op Assessment Note    CV Status:  Stable    Pain management: adequate     Mental Status:  Alert and awake   Hydration Status:  Euvolemic   PONV Controlled:  Controlled   Airway Patency:  Patent      Post Op Vitals Reviewed: Yes            No complications documented      BP      Temp     Pulse  66   Resp   16   SpO2   95

## 2022-09-21 NOTE — ANESTHESIA PREPROCEDURE EVALUATION
Procedure:  MRI LUMBAR SPINE WO CONTRAST    Relevant Problems   CARDIO   (+) Common migraine without aura      MUSCULOSKELETAL   (+) Fibromyalgia   (+) Rheumatoid arthritis (HCC)      NEURO/PSYCH   (+) Anxiety   (+) Common migraine without aura   (+) Depression   (+) Fibromyalgia   (+) Generalized anxiety disorder with panic attacks   (+) Opiate dependence, continuous (HCC)      Digestive   (+) Esophagitis, reflux      Nervous and Auditory   (+) Lumbar radiculopathy      Genitourinary   (+) Acute cystitis with hematuria   (+) Bacterial vaginosis   (+) Candidal vulvovaginitis      Other   (+) Breast lump   (+) Primary insomnia      Lab Results   Component Value Date     06/11/2015    SODIUM 141 10/12/2021    K 3 7 10/12/2021     10/12/2021    CO2 28 10/12/2021    ANIONGAP 9 06/11/2015    AGAP 7 10/12/2021    BUN 14 10/12/2021    CREATININE 0 73 10/12/2021    GLUC 88 09/17/2018    GLUF 90 10/12/2021    CALCIUM 9 3 10/12/2021    AST 16 10/12/2021    ALT 20 10/12/2021    ALKPHOS 51 10/12/2021    PROT 8 1 06/10/2015    TP 7 7 10/12/2021    BILITOT 0 4 06/10/2015    TBILI 0 36 10/12/2021    EGFR 100 10/12/2021     Lab Results   Component Value Date    WBC 6 15 10/12/2021    HGB 12 6 10/12/2021    HCT 39 0 10/12/2021    MCV 85 10/12/2021     10/12/2021       Physical Exam    Airway    Mallampati score: II  TM Distance: <3 FB  Neck ROM: full     Dental   Comment: edentulous,     Cardiovascular      Pulmonary      Other Findings        Anesthesia Plan  ASA Score- 2     Anesthesia Type- general with ASA Monitors  Additional Monitors:   Airway Plan: LMA  Plan Factors-Exercise tolerance (METS): >4 METS  Chart reviewed  EKG reviewed  Imaging results reviewed  Existing labs reviewed  Patient summary reviewed  Induction-     Postoperative Plan-     Informed Consent- Anesthetic plan and risks discussed with patient  I personally reviewed this patient with the CRNA   Discussed and agreed on the Anesthesia Plan with the NELA Mccann

## 2022-09-21 NOTE — NURSING NOTE
MRI of lumbar spine w/out contrast complete  Pt tolerated well  VSS  Pt alert and oriented x4 on room air  No complaints or visible signs of distress  Report given to Bisi Canela

## 2022-09-23 DIAGNOSIS — R94.131 ABNORMAL EMG: ICD-10-CM

## 2022-09-23 DIAGNOSIS — M54.16 LUMBAR RADICULOPATHY: Primary | ICD-10-CM

## 2022-10-03 NOTE — PSYCH
MEDICATION MANAGEMENT NOTE        [facility]      Name and Date of Birth:  Fco Abdalla 56 y o  1975 MRN: [id number]    Date of Visit: October 4, 2022    Reason for Visit: Follow-up visit for medication management     SUBJECTIVE:    Fco Abdalla is a 56 y o  female with past psychiatric history significant for generalized anxiety disorder with panic attacks, opiate dependence (Rx Zubsolv via [facility]), insomnia, and extensive history of polysubstance abuse (cannabis, hallucinogens, cocaine) who was personally seen and evaluated today at the [address] [place] [address] outpatient clinic for follow-up and medication management  Sampson Becker presents as anxious yet pleasant and cooperative  Her thoughts are linear and organized  She completes assessment without difficulty  Of note, Sampson Becker was interviewed alongside Scott Ramirez, [place] student, as Sampson Becker consented to this  Shereen endorses compliance with psychotropic medication regimen consisting of Xanax and PRN Trazodone  She denies adverse medication side effects  PDMP website reviewed, no acute concerns for abuse or misuse  Since last visit, Sampson Becker has discontinued use of Promethazine, as this agent was triggering false positive results during urine screenings  She does not exhibit objective evidence of substance intoxication or withdrawal today  She speaks at length regarding her commitment to sobriety and recently celebrated her 5 year anniversary of being clean in September 2022  Acutely, Sampson Becker endorses an uptick in anxiety secondary to a plethora of psychosocial issues  She reports ongoing marital discord and speaks to ways her  is devaluing  He is now involved with multiple bands and has been attending events at [employer] and even pumpkin picking, without caring for Sampson Becker and her concerns  Supportive therapy provided   Sampson Becker also reports uptick in worry regarding caring for her new grandson 3 days per week  She speaks to finding "purpose and meaning" within watching her grandchild but nonetheless, finds it to be physically and emotionally taxing at times  Shereen reports distress related to delayed oral surgery  She is set for intake in December 2022 and possible surgery in winter 2023  Clement Mendoza is currently struggling with acute pain issues  She was referred to neurosurgery but has yet to follow-up with them  She was recently Rx Gabapentin but did not want to start this agent until speaking with this provider today  Matt Bryan reports everyday worry, distress, and anxiety  She has been more restless at home, on-edge, and tense  She is utilizing Prn Xanax appropriately  She is not mixing this with her opiate regimen  Shereen's sleep has been more fragmented  Her appetite is poor  She denies SI/HI at the moment  Her energy and motivation have been limited  She denies daily crying spells or anhedonia  During today's examination, Matt Bryan does not exhibit objective evidence of j carlos/hypomania or psychosis  Matt Bryan is not currently irritable, grandiose, labile, or pathologically euphoric  Matt Bryan is without perceptual disturbances, such as A/V hallucinations, paranoia, ideas of reference, or delusional beliefs  Matt Bryan denies recent ETOH or illicit substance abuse  She offers no further concerns  Current Rating Scores:     None completed today      Review Of Systems:      Constitutional feeling tired, low energy and as noted in HPI   ENT Oral discomfort    Cardiovascular negative   Respiratory negative   Gastrointestinal negative   Genitourinary negative   Musculoskeletal arthralgias, myalgias and joint pain   Integumentary negative   Neurological neuropathic pain   Endocrine negative   Other Symptoms none, all other systems are negative       Past Psychiatric History: (unchanged information from previous note copied and italicized) - Information that is bolded has been updated       Inpatient psychiatric admissions: Numerous past psychiatric admissions - last in 2017 at [facility] for depression, SI in context of drug use  Prior outpatient psychiatric linkage: Numerous psychiatrists in the past - Last linkage with [facility] via [address] psychotherapy: History of psychotherapy linkage, no current therapists  History of suicidal attempts/gestures: Numerous attempts in the past including overdose and "cutting wrists"  History of violence/aggressive behaviors: Denies  Psychotropic medication trials: Lexapro, Prozac, Zoloft, Paxil, Remeron, Trazodone, Wellbutrin, BuSpar, VPA, Lithium, Risperdal, Seroquel, Gabapentin, Propranolol, Clonidine, Valium, Xanax, Klonopin, Adderall, Ativan, Librium, Provigil, Pristiq (too much GI discomfort)    Substance abuse inpatient/outpatient rehabilitation: 10+ rehab/detox admissions - first at the age of 17 in [facility] - last in 2017 at [facility]     Substance Abuse History: (unchanged information from previous note copied and italicized) - Information that is bolded has been updated      Extensive history of illict substance (cocaine, methamphetamine, cannabis, opiates, hallucinogen), and tobacco abuse  She denies a history of ETOH abuse  No past legal actions yet 1 prior arrest secondary to substance intoxication  The patient denies prior DWIs/DUIs  Extensive history of outpatient/inpatient rehabilitation programs  Shereen does not exhibit objective evidence of substance withdrawal during today's examination nor does Shereen appear under the influence of any psychoactive substance           Social History: (unchanged information from previous note copied and italicized) - Information that is bolded has been updated       Developmental: Denies a history of milestone/developmental delay  Denies a history of in-utero exposure to toxins/illicit substances  There is no documented history of IEP or need for special education    [employer]  Marital history:   Living arrangement, social support: , has 2 children (daughter and son)  Occupational History: on permanent disability  Access to firearms: Denies direct access to weapons/firearms  Shereen Paris has no history of arrests or violence with a deadly weapon       Traumatic History: (unchanged information from previous note copied and italicized) - Information that is bolded has been updated       Abuse:none is reported  Other Traumatic Events: Father dying in 2017 was problematic      Past Medical History:    Past Medical History:   Diagnosis Date    [facility]     Anxiety     Arthritis     Depression     Endometriosis     no current issues    Infectious viral hepatitis     history of hep c    Irritable bowel syndrome     Substance abuse ([facility]         Past Surgical History:   Procedure Laterality Date    APPENDECTOMY      CHOLECYSTECTOMY      HEMORRHOID SURGERY       Allergies   Allergen Reactions    Ciprofloxacin Anaphylaxis     Interacts with Zubsolv    Codeine Lightheadedness     Reaction Date: 07Jun2011;     Morphine Lightheadedness     Reaction Date: 07Jun2011;     Sulfa Antibiotics Anaphylaxis and Rash    Tramadol Lightheadedness       Substance Abuse History:    Social History     Substance and Sexual Activity   Alcohol Use Yes    Comment: rarely/holidays     Social History     Substance and Sexual Activity   Drug Use Not Currently    Types: Heroin, Cocaine       Social History:    Social History     Socioeconomic History    Marital status: /Civil Union     Spouse name: Lucius    Number of children: 2    Years of education: Not on file    Highest education level: Not on file   Occupational History    Occupation: homemaker   Tobacco Use    Smoking status: Former Smoker     Quit date: 3/14/2019     Years since quitting: 3 5    Smokeless tobacco: Never Used   Vaping Use    Vaping Use: Never used   Substance and Sexual Activity    Alcohol use: Yes     Comment: rarely/holidays    Drug use: Not Currently     Types: Heroin, Cocaine    Sexual activity: Yes     Partners: Male     Birth control/protection: Injection   Other Topics Concern    Not on file   Social History Narrative    Not on file     Social Determinants of Health     Financial Resource Strain: Low Risk     Difficulty of Paying Living Expenses: Not hard at all   Food Insecurity: No Food Insecurity    Worried About Running Out of Food in the Last Year: Never true    Arnel of Food in the Last Year: Never true   Transportation Needs: No Transportation Needs    Lack of Transportation (Medical): No    Lack of Transportation (Non-Medical): No   Physical Activity: Not on file   Stress: No Stress Concern Present    Feeling of Stress : Only a little   Social Connections: Not on file   Intimate Partner Violence: Not on file   Housing Stability: [address] Unable to Pay for Housing in the Last Year: No    Number of [place] in the Last Year: 1    Unstable Housing in the Last Year: No       Family Psychiatric History:     Family History   Problem Relation Age of Onset    Cancer Father     Drug abuse Father     Drug abuse Mother     No Known Problems Sister     No Known Problems Daughter     No Known Problems Maternal Grandmother     Breast cancer Maternal Grandfather [de-identified]    Prostate cancer Maternal Grandfather     Melanoma Maternal Grandfather     Colon cancer Maternal Grandfather 79    No Known Problems Paternal Grandmother     No Known Problems Paternal Grandfather     No Known Problems Daughter     No Known Problems Maternal Aunt        History Review: The following portions of the patient's history were reviewed and updated as appropriate: allergies, current medications, past family history, past medical history, past social history, past surgical history and problem list          OBJECTIVE:     Vital signs in last 24 hours:     There were no vitals filed for this visit  Mental Status Evaluation:    Appearance age appropriate, casually dressed, dressed appropriately, looks stated age, piercings, tattooed   Behavior pleasant, cooperative, appears anxious, good eye contact   Speech normal rate, normal volume, normal pitch   Mood anxious   Affect constricted   Thought Processes organized, logical, goal directed   Associations intact associations   Thought Content no overt delusions   Perceptual Disturbances: no auditory hallucinations, no visual hallucinations   Abnormal Thoughts  Risk Potential Suicidal ideation - None at present  Homicidal ideation - None at present  Potential for aggression - No   Orientation oriented to person, place, time/date and situation   Memory recent and remote memory grossly intact   Consciousness alert and awake   Attention Span Concentration Span attention span and concentration are age appropriate   Intellect appears to be of average intelligence   Insight intact and good   Judgement intact and good   Muscle Strength and  Gait normal gait and normal balance   Motor activity no abnormal movements   Language no difficulty naming common objects   Fund of Knowledge adequate knowledge of current events   Pain moderate   Pain Scale did not rate       Laboratory Results: I have personally reviewed all pertinent laboratory/tests results    Recent Labs (last 4 months):   Hospital Outpatient Visit on 09/21/2022   Component Date Value    EXT Preg Test, Ur 09/21/2022 Negative     Control 09/21/2022 Valid     HCG, Quant 09/21/2022 <2    Office Visit on 08/29/2022   Component Date Value    Hemoglobin A1C 08/29/2022 5 0        Suicide/Homicide Risk Assessment:    The following interventions are recommended: no intervention changes needed      Lethality Statement:    Based on today's assessment and clinical criteria, Mariam Villavicencio contracts for safety and is not an imminent risk of harm to self or others   Outpatient level of care is deemed appropriate at this current time  Holly Araujo understands that if they can no longer contract for safety, they need to call the office or report to their nearest Emergency Room for immediate evaluation  Assessment/Plan:     Shereen Paris is a 47 y  o  female with past psychiatric history significant for generalized anxiety disorder with panic attacks, opiate dependence (Rx [facility] via [facility]), insomnia, and extensive history of polysubstance abuse (cannabis, hallucinogens, cocaine) who was personally seen and evaluated today at the [address] [place] [address] E outpatient clinic for follow-up and medication management  Shereen endorses a longstanding history of anxiety and episodic depression that served as the catalyst for her substance abuse  She states that her biological parents abused illicit substances and thus, they were emotionally neglectful and inconsistent  At the age of 16, Holly Araujo began using cannabis which ultimately lead to use of numerous illicit substances  Shereen reports placement in 10+ detox/rehabilitation programs throughout the course of her life  Her first rehabilitation program was at age 17 in [facility]  She last attended rehab via [facility] in 2017, following the death of her father and and subsequent inpatient admission at [facility]  She endorses sobriety from opiates and other illicit substances for the last 3+ years  She was recently psychiatrically managed via [facility]   Evan Ryan through [facility]  However, given their change in policy regarding concurrent use of opiates and benzodiazepines, Holly Araujo was transferred to [facility] for safer, more appropriate tapering process  Holly Araujo has been treated with benzodiazepines since her early 20's  She was previously on Xanax 6mg Daily (2mg TID) but was recently tapered to 4mg Daily (2mg AM, 1mg afternoon, 1mg QHS) over the last 8 months  PDMP nena reviewed, no concerns for abuse or misuse   I spoke at length with Holly Araujo about 's policy regarding concurrent use of opiates and benzodiazepines and risks/alternatives to treatment  She voiced understanding regarding the need to taper off Xanax and was agreeable to do so  I also discussed possibility of faster tapering process via detox program at [facility], to which she was resistant given responsibility of caring for her family  Shereen denies most neurovegetative symptomatology suggestive of major depressive disorder  Shereen adamantly denies acute thoughts of suicide or self-harm  Shereen has no plans to harm others  There is a documented history of prior suicidal gestures/suicidal attempts  Shereen admits to overdosing and "cutting her wrists" in the past  She has not harmed herself or attempted to end her life in 4+ years  Shereen endorses a historical struggle with anxiety and symptomatology consistent with generalized anxiety disorder  Shereen reports anxiety that is pathologic in nature  Shereen endorses excessive nervousness, irrational worry, and overt anxiousness  Shereen is not pervasively restless or tense but does report feeling episodically keyed-up and on-edge  Shereen experiences disruption in energy and concentration secondary to anxiety  There is evidence to suggest that Shereen experiences irritability, inability to relax, and disruption in sleep in the past, secondary to pathologic anxiety  Shereen denies new-onset panic symptomatology but does report a history of panic attacks characterized by tremor, SOB, fear of impending doom, and overt anxiousness  She denies maladaptive behaviors  Shereen vehemently denies any acute or chronic history suggestive of an underlying affective (bipolar) organization  Shereen denies historical symptomatology suggestive of an underlying psychotic process  Shereen denies historical symptomatology suggestive of PTSD, OCD, or disordered eating         Today's Plan:     Psychopharmacologically, Shereen reports tolerability and benefit from Prn Trazodone and Xanax   She denies need for medication intervention to manage baseline anxiety and depressive symptoms, which was offered  Instead, she will start Gabapentin (Rx by another provider) as this agent may assist with anxiety relief and pain control  She will also prioritize behavioral interventions and socialization  Risks and side effects of chronic benzodiazepine use discussed with Abdoul Silverio , including risk for falls, sedation, respiratory depression (especially if taken in higher doses than prescribed or if taken together with another sedating substance such as alcohol or opiate medications), as well as risk of addiction (especially if taken more often or at higher doses than prescribed) and withdrawal (if stops abruptly, especially if taken more often or at higher doses) including seizures and death  Matt Savage was instructed to not drive or operate heavy machinery while taking benzodiazepines, as the medication can cause increased drowsiness  Shereen verbalized understanding and would like to continue at current dose   Will continue tapering process in winter/spring 2023 when anxiety and psychosocial stressors are better managed       DSM-V Diagnoses:      1 ) Generalized Anxiety Disorder with Panic Attacks   2 ) Opiate dependence, continuous  3 ) Polysubstance abuse by history   4 ) Insomnia        Treatment Recommendations/Precautions:     1 ) Generalized Anxiety Disorder with Panic Attacks   - Continue Xanax 1mg TID - tapering process in effect (was previously on high doses)              - I inherited Shereen on high-dose Xanax - plan has been to ultimately taper off Xanax given concurrent use of Suboxone, however, Matt Savage has been on BZ for 20+ years so tapering process will be arduous   - Will start Gabapentin 100mg BID (rx by another physician)   - Psychoeducation provided regarding the importance of exercise and healthy dietary choices and their impact on mood, energy, and motivation  - Counseled to avoid ETOH, illict substances, and nicotine secondary to the detrimental effects of these substances on mental and physical health  - Encouraged to engage in non-verbal forms of therapy such as art therapy, music therapy, and mindfulness  - Discussed the bio-psycho-social model to treatment and therapeutic exercises/interventions were attempted to cognitively restructure thoughts     2 ) Opiate dependence, continuous  - [facility] 8 [facility]       3  ) Polysubstance abuse by history   - Currently sober  - Counseled to avoid ETOH, illict substances, and nicotine secondary to the detrimental effects of these substances on mental and physical health     4 ) Insomnia  - Discontinue Promethazine 25mg QHS PRN  - Continue Trazodone 25-50mg QHS PRN      Medication management every 4 months  Aware of need to follow up with family physician for medical issues  Aware of 24 hour and weekend coverage for urgent situations accessed by calling [facility] main practice number    Medications Risks/Benefits      Risks, Benefits And Possible Side Effects Of Medications:    Risks, benefits, and possible side effects of medications explained to [facility] including risks of misuse, abuse or dependence, sedation and respiratory depression related to treatment with benzodiazepine medications  She verbalizes understanding and agreement for treatment  Controlled Medication Discussion:     [facility] has been filling controlled prescriptions on time as prescribed according to [facility]  Discussed with [facility] the risks of sedation, respiratory depression, impairment of ability to drive and potential for abuse and addiction related to treatment with benzodiazepine medications   She understands risk of treatment with benzodiazepine medications, agrees to not drive if feels impaired and agrees to take medications as prescribed  Discussed with Nandini Every Box warning on concurrent use of benzodiazepines and opioid medications including sedation, respiratory depression, coma and death  She understands the risk of treatment with benzodiazepines in addition to opioids and wants to continue taking those medications    Psychotherapy Provided:     Individual psychotherapy provided: Yes  Counseling was provided during the session today for 16 minutes  Medications, treatment progress and treatment plan reviewed with Shereen  Medication changes discussed with Shereen  Medication education provided to [facility]  Recent stressors discussed with [facility] including family conflict, family issues, marital problems, health issues, medical problems, chronic pain, limited support, social difficulties, everyday stressors and chronic anxiety  Coping strategies reviewed with Shereen  Educated on importance of medication and treatment compliance  Importance of follow up with family physician for medical issues reviewed with [facility]  Supportive therapy provided  Treatment Plan:    Completed and signed during the session: Yes - Treatment Plan done but not signed at time of office visit due to:  Plan reviewed in person and verbal consent given due to [place] social distancing    Note Share Disclaimer:      This note was not shared with the patient due to reasonable likelihood of causing patient harm      Todd Jauregui MD  Board Certified Diplomate of the [address] [place] [address] Rd , [address] of Psychiatry and Neurology  10/04/22

## 2022-10-04 ENCOUNTER — OFFICE VISIT (OUTPATIENT)
Dept: PSYCHIATRY | Facility: CLINIC | Age: 47
End: 2022-10-04
Payer: COMMERCIAL

## 2022-10-04 DIAGNOSIS — F11.20 OPIATE DEPENDENCE, CONTINUOUS (HCC): ICD-10-CM

## 2022-10-04 DIAGNOSIS — F41.0 GENERALIZED ANXIETY DISORDER WITH PANIC ATTACKS: Primary | ICD-10-CM

## 2022-10-04 DIAGNOSIS — F41.1 GENERALIZED ANXIETY DISORDER WITH PANIC ATTACKS: Primary | ICD-10-CM

## 2022-10-04 DIAGNOSIS — F51.04 PSYCHOPHYSIOLOGICAL INSOMNIA: ICD-10-CM

## 2022-10-04 PROCEDURE — 99214 OFFICE O/P EST MOD 30 MIN: CPT | Performed by: STUDENT IN AN ORGANIZED HEALTH CARE EDUCATION/TRAINING PROGRAM

## 2022-10-04 RX ORDER — TRAZODONE HYDROCHLORIDE 50 MG/1
50 TABLET ORAL
Qty: 30 TABLET | Refills: 3 | Status: SHIPPED | OUTPATIENT
Start: 2022-10-04 | End: 2022-10-19

## 2022-10-04 NOTE — BH TREATMENT PLAN
TREATMENT PLAN (Medication Management Only)        [facility]    Name and Date of Birth:  Fanny Christianson 56 y o  1975  Date of Treatment Plan: October 4, 2022  Diagnosis/Diagnoses:    1  Generalized anxiety disorder with panic attacks    2  Psychophysiological insomnia      Strengths/Personal Resources for Self-Care: supportive family, supportive friends, taking medications as prescribed, ability to adapt to life changes, ability to communicate needs, ability to communicate well, ability to listen, ability to reason, ability to understand psychiatric illness, motivation for treatment, ability to negotiate basic needs, being resoureceful, self-reliance, sense of humor, special hobby/interest, willingness to work on problems  Area/Areas of need (in own words): anxiety symptoms, sleep problems  1  Long Term Goal: improve control of anxiety  Target Date:6 months - 4/4/2023  Person/Persons responsible for completion of goal: Shereen  2  Short Term Objective (s) - How will we reach this goal?:   A  Provider new recommended medication/dosage changes and/or continue medication(s): continue current medications as prescribed  B  Attend medication management appointments regularly  C  Take psychiatric medications responsibly  D  Maintain my sobriety   E  Continue to care for grandson 3 days per week  F  Improve control of at home chores   Target Date:6 months - 4/4/2023  Person/Persons Responsible for Completion of Goal: Shereen  Progress Towards Goals: continuing treatment  Treatment Modality: medication management every 3 months  Review due 180 days from date of this plan: 6 months - 4/4/2023  Expected length of service: ongoing treatment  My Physician/PA/NP and I have developed this plan together and I agree to work on the goals and objectives  I understand the treatment goals that were developed for my treatment  Treatment Plan completed with assistance and input from patient and verbal consent provided  Treatment plan was not signed at time of office visit secondary to COVID-19 social distancing guidelines

## 2022-10-14 ENCOUNTER — APPOINTMENT (OUTPATIENT)
Dept: LAB | Facility: HOSPITAL | Age: 47
End: 2022-10-14
Payer: MEDICARE

## 2022-10-14 DIAGNOSIS — Z00.01 ENCOUNTER FOR GENERAL ADULT MEDICAL EXAMINATION WITH ABNORMAL FINDINGS: ICD-10-CM

## 2022-10-14 LAB
25(OH)D3 SERPL-MCNC: 57.3 NG/ML (ref 30–100)
ALBUMIN SERPL BCP-MCNC: 4.4 G/DL (ref 3.5–5)
ALP SERPL-CCNC: 54 U/L (ref 43–122)
ALT SERPL W P-5'-P-CCNC: 17 U/L
ANION GAP SERPL CALCULATED.3IONS-SCNC: 9 MMOL/L (ref 5–14)
AST SERPL W P-5'-P-CCNC: 22 U/L (ref 14–36)
BASOPHILS # BLD AUTO: 0.02 THOUSANDS/ΜL (ref 0–0.1)
BASOPHILS NFR BLD AUTO: 0 % (ref 0–1)
BILIRUB SERPL-MCNC: 0.32 MG/DL (ref 0.2–1)
BUN SERPL-MCNC: 16 MG/DL (ref 5–25)
CALCIUM SERPL-MCNC: 9.4 MG/DL (ref 8.4–10.2)
CHLORIDE SERPL-SCNC: 103 MMOL/L (ref 96–108)
CHOLEST SERPL-MCNC: 187 MG/DL
CO2 SERPL-SCNC: 28 MMOL/L (ref 21–32)
CREAT SERPL-MCNC: 0.69 MG/DL (ref 0.6–1.2)
EOSINOPHIL # BLD AUTO: 0.1 THOUSAND/ΜL (ref 0–0.61)
EOSINOPHIL NFR BLD AUTO: 2 % (ref 0–6)
ERYTHROCYTE [DISTWIDTH] IN BLOOD BY AUTOMATED COUNT: 11.9 % (ref 11.6–15.1)
EST. AVERAGE GLUCOSE BLD GHB EST-MCNC: 105 MG/DL
FERRITIN SERPL-MCNC: 41 NG/ML (ref 8–388)
GFR SERPL CREATININE-BSD FRML MDRD: 104 ML/MIN/1.73SQ M
GLUCOSE P FAST SERPL-MCNC: 92 MG/DL (ref 70–99)
HBA1C MFR BLD: 5.3 %
HCT VFR BLD AUTO: 40.8 % (ref 34.8–46.1)
HDLC SERPL-MCNC: 50 MG/DL
HGB BLD-MCNC: 13.1 G/DL (ref 11.5–15.4)
IMM GRANULOCYTES # BLD AUTO: 0.01 THOUSAND/UL (ref 0–0.2)
IMM GRANULOCYTES NFR BLD AUTO: 0 % (ref 0–2)
LDLC SERPL CALC-MCNC: 124 MG/DL
LYMPHOCYTES # BLD AUTO: 1.76 THOUSANDS/ΜL (ref 0.6–4.47)
LYMPHOCYTES NFR BLD AUTO: 30 % (ref 14–44)
MAGNESIUM SERPL-MCNC: 2.1 MG/DL (ref 1.6–2.3)
MCH RBC QN AUTO: 27.5 PG (ref 26.8–34.3)
MCHC RBC AUTO-ENTMCNC: 32.1 G/DL (ref 31.4–37.4)
MCV RBC AUTO: 86 FL (ref 82–98)
MONOCYTES # BLD AUTO: 0.3 THOUSAND/ΜL (ref 0.17–1.22)
MONOCYTES NFR BLD AUTO: 5 % (ref 4–12)
NEUTROPHILS # BLD AUTO: 3.65 THOUSANDS/ΜL (ref 1.85–7.62)
NEUTS SEG NFR BLD AUTO: 63 % (ref 43–75)
NONHDLC SERPL-MCNC: 137 MG/DL
NRBC BLD AUTO-RTO: 0 /100 WBCS
PLATELET # BLD AUTO: 225 THOUSANDS/UL (ref 149–390)
PMV BLD AUTO: 11 FL (ref 8.9–12.7)
POTASSIUM SERPL-SCNC: 3.8 MMOL/L (ref 3.5–5.3)
PROT SERPL-MCNC: 7.6 G/DL (ref 6.4–8.4)
RBC # BLD AUTO: 4.76 MILLION/UL (ref 3.81–5.12)
SODIUM SERPL-SCNC: 140 MMOL/L (ref 135–147)
T4 FREE SERPL-MCNC: 1.12 NG/DL (ref 0.76–1.46)
TRIGL SERPL-MCNC: 66 MG/DL
TSH SERPL DL<=0.05 MIU/L-ACNC: 3.13 UIU/ML (ref 0.45–4.5)
VIT B12 SERPL-MCNC: 529 PG/ML (ref 100–900)
WBC # BLD AUTO: 5.84 THOUSAND/UL (ref 4.31–10.16)

## 2022-10-14 PROCEDURE — 82728 ASSAY OF FERRITIN: CPT

## 2022-10-14 PROCEDURE — 87340 HEPATITIS B SURFACE AG IA: CPT

## 2022-10-14 PROCEDURE — 86704 HEP B CORE ANTIBODY TOTAL: CPT

## 2022-10-14 PROCEDURE — 80061 LIPID PANEL: CPT

## 2022-10-14 PROCEDURE — 85025 COMPLETE CBC W/AUTO DIFF WBC: CPT

## 2022-10-14 PROCEDURE — 86705 HEP B CORE ANTIBODY IGM: CPT

## 2022-10-14 PROCEDURE — 82607 VITAMIN B-12: CPT

## 2022-10-14 PROCEDURE — 80053 COMPREHEN METABOLIC PANEL: CPT

## 2022-10-14 PROCEDURE — 86803 HEPATITIS C AB TEST: CPT

## 2022-10-14 PROCEDURE — 84439 ASSAY OF FREE THYROXINE: CPT

## 2022-10-14 PROCEDURE — 83735 ASSAY OF MAGNESIUM: CPT

## 2022-10-14 PROCEDURE — 84443 ASSAY THYROID STIM HORMONE: CPT

## 2022-10-14 PROCEDURE — 82306 VITAMIN D 25 HYDROXY: CPT

## 2022-10-15 LAB
HBV CORE AB SER QL: ABNORMAL
HBV CORE IGM SER QL: ABNORMAL
HBV SURFACE AG SER QL: ABNORMAL
HCV AB SER QL: ABNORMAL

## 2022-10-18 ENCOUNTER — TELEPHONE (OUTPATIENT)
Dept: PSYCHIATRY | Facility: CLINIC | Age: 47
End: 2022-10-18

## 2022-10-18 NOTE — TELEPHONE ENCOUNTER
Gennaro Lo requested a call back to discuss her sleep medication as it is not working and pt would like to be placed back on old medication  They can be reached at P# [phone]      Thank you

## 2022-10-19 DIAGNOSIS — F51.01 PRIMARY INSOMNIA: Primary | ICD-10-CM

## 2022-10-19 RX ORDER — PROMETHAZINE HYDROCHLORIDE 25 MG/1
25 TABLET ORAL
Qty: 30 TABLET | Refills: 3 | Status: SHIPPED | OUTPATIENT
Start: 2022-10-19

## 2022-10-19 NOTE — TELEPHONE ENCOUNTER
LM for Shereen- following up from message left yesterday- looking to get more information   Provided nursing number

## 2022-10-19 NOTE — TELEPHONE ENCOUNTER
Fabi Klein called back and lm  She said the Trazodone is not working  She can't fall asleep or stay asleep  She said they stopped the Promethazine for false positives in her urine but the other doctor stated it is ok  If she is not sleeping just take it  She is requesting to stop the Trazodone and go back on Promethazine       Please review

## 2022-10-19 NOTE — TELEPHONE ENCOUNTER
Agree with plan to discontinue PRN Trazodone and re-start Promethazine 25mg QHS PRN which was beneficial in past for sleep  New script sent to pharmacy today, please make patient aware  Thank you

## 2022-11-17 ENCOUNTER — OFFICE VISIT (OUTPATIENT)
Dept: FAMILY MEDICINE CLINIC | Facility: CLINIC | Age: 47
End: 2022-11-17

## 2022-11-17 VITALS
WEIGHT: 187 LBS | HEART RATE: 88 BPM | SYSTOLIC BLOOD PRESSURE: 140 MMHG | TEMPERATURE: 98 F | OXYGEN SATURATION: 98 % | DIASTOLIC BLOOD PRESSURE: 90 MMHG | HEIGHT: 65 IN | BODY MASS INDEX: 31.16 KG/M2 | RESPIRATION RATE: 14 BRPM

## 2022-11-17 DIAGNOSIS — M54.16 LUMBAR RADICULOPATHY: ICD-10-CM

## 2022-11-17 DIAGNOSIS — Z00.01 ENCOUNTER FOR GENERAL ADULT MEDICAL EXAMINATION WITH ABNORMAL FINDINGS: Primary | ICD-10-CM

## 2022-11-17 DIAGNOSIS — J06.9 ACUTE URI: ICD-10-CM

## 2022-11-17 RX ORDER — GUAIFENESIN/DEXTROMETHORPHAN 100-10MG/5
5 SYRUP ORAL 3 TIMES DAILY PRN
Qty: 118 ML | Refills: 1 | Status: SHIPPED | OUTPATIENT
Start: 2022-11-17

## 2022-11-17 RX ORDER — CLINDAMYCIN HYDROCHLORIDE 300 MG/1
300 CAPSULE ORAL 2 TIMES DAILY WITH MEALS
Qty: 20 CAPSULE | Refills: 0 | Status: SHIPPED | OUTPATIENT
Start: 2022-11-17 | End: 2022-11-27

## 2022-11-17 NOTE — PROGRESS NOTES
BMI Counseling: Body mass index is 31 12 kg/m²  The BMI is above normal  Nutrition recommendations include reducing portion sizes

## 2022-11-17 NOTE — PROGRESS NOTES
Name: Mariam Villavicencio      : 1975      MRN: [id number]  Encounter Provider: Tra Friedman MD  Encounter Date: 2022   Encounter department: [address] [place] [address]     1  Encounter for general adult medical examination with abnormal findings    2  Lumbar radiculopathy    3  Acute URI  -     clindamycin (CLEOCIN) 300 MG capsule; Take 1 capsule (300 mg total) by mouth 2 (two) times a day with meals for 10 days  -     dextromethorphan-guaiFENesin (ROBITUSSIN DM)  mg/5 mL syrup; Take 5 mL by mouth 3 (three) times a day as needed for cough or congestion    Bed Rest  Increase Po fluids  Off work 3 Days  Annual physical Exam : Done in detail    Life style mod  RTC in 3 mos w Blood  work       Subjective      [address] is here for Annual Physical Exam and Regular check Up, She has few symptoms, cold symptoms for last 2-3 weeks, recent Blood work and med list reviewed w pt,  Review of Systems   Constitutional: Negative for chills, fatigue and fever  HENT: Positive for congestion, postnasal drip, sinus pressure and sore throat  Negative for facial swelling, trouble swallowing and voice change  Eyes: Negative for pain, discharge and visual disturbance  Respiratory: Positive for cough and wheezing  Negative for shortness of breath  Cardiovascular: Negative for chest pain, palpitations and leg swelling  Gastrointestinal: Negative for abdominal pain, blood in stool, constipation, diarrhea and nausea  Endocrine: Negative for polydipsia, polyphagia and polyuria  Genitourinary: Negative for difficulty urinating, hematuria and urgency  Musculoskeletal: Negative for arthralgias and myalgias  Skin: Negative for rash  Neurological: Negative for dizziness, tremors, weakness and headaches  Hematological: Negative for adenopathy  Does not bruise/bleed easily     Psychiatric/Behavioral: Negative for dysphoric mood, sleep disturbance and suicidal ideas        Current Outpatient Medications on File Prior to Visit   Medication Sig   • ALPRAZolam (XANAX) 1 mg tablet Take 1 tablet (1 mg total) by mouth 3 (three) times a day as needed for anxiety   • Buprenorphine HCl-Naloxone HCl 8 6-2 1 MG SUBL    • gabapentin (Neurontin) 100 mg capsule Take 1 capsule (100 mg total) by mouth 2 (two) times a day With food/Meals   • medroxyPROGESTERone (DEPO-PROVERA) 150 mg/mL injection Inject 1 mL (150 mg total) into a muscle every 3 (three) months   • polyethylene glycol (GLYCOLAX) 17 GM/SCOOP powder    • promethazine (PHENERGAN) 25 mg tablet Take 1 tablet (25 mg total) by mouth daily at bedtime as needed (Sleep)   • naloxegol oxalate (MOVANTIK) 25 MG tablet Take 1 tablet (25 mg total) by mouth in the morning       Objective     /90 (BP Location: Left arm, Patient Position: Sitting, Cuff Size: Standard)   Pulse 88   Temp 98 °F (36 7 °C) (Tympanic)   Resp 14   Ht 5' 5" (1 651 m)   Wt 84 8 kg (187 lb)   SpO2 98%   BMI 31 12 kg/m²     Physical Exam  Constitutional:       General: She is not in acute distress  Appearance: She is well-nourished  HENT:      Head: Normocephalic  Mouth/Throat:      Mouth: Oropharynx is clear and moist       Pharynx: No oropharyngeal exudate  Eyes:      General: No scleral icterus  Conjunctiva/sclera: Conjunctivae normal       Pupils: Pupils are equal, round, and reactive to light  Neck:      Thyroid: No thyromegaly  Cardiovascular:      Rate and Rhythm: Normal rate and regular rhythm  Heart sounds: Normal heart sounds  No murmur heard  Pulmonary:      Effort: Pulmonary effort is normal  No respiratory distress  Breath sounds: Normal breath sounds  No wheezing or rales  Abdominal:      General: Bowel sounds are normal  There is no distension  Palpations: Abdomen is soft  Tenderness: There is no abdominal tenderness  There is no guarding or rebound     Musculoskeletal:         General: No tenderness or edema  Cervical back: Neck supple  Lymphadenopathy:      Cervical: No cervical adenopathy  Skin:     Coloration: Skin is not pale  Findings: No rash  Neurological:      Mental Status: She is alert and oriented to person, place, and time  Sensory: No sensory deficit  Motor: No weakness     Psychiatric:         Mood and Affect: Mood and affect normal        Katja Qureshi MD

## 2022-11-18 DIAGNOSIS — F41.1 GENERALIZED ANXIETY DISORDER WITH PANIC ATTACKS: ICD-10-CM

## 2022-11-18 DIAGNOSIS — F41.0 GENERALIZED ANXIETY DISORDER WITH PANIC ATTACKS: ICD-10-CM

## 2022-11-18 RX ORDER — ALPRAZOLAM 1 MG/1
1 TABLET ORAL 3 TIMES DAILY PRN
Qty: 90 TABLET | Refills: 1 | Status: SHIPPED | OUTPATIENT
Start: 2022-11-18

## 2022-11-18 NOTE — TELEPHONE ENCOUNTER
PDMP website reviewed  Charleslina Van has been appropriately adherent to controlled psychotropic medications without evidence of abuse or misuse  As such, will send 30-day refill to pharmacy of choice and follow up as necessary

## 2022-11-18 NOTE — TELEPHONE ENCOUNTER
Medication Refill Request     Name of Medication Xanax  Dose/Frequency 1mg three times a day   Quantity 90 tablet  Verified pharmacy   [x]  Verified ordering Provider   [x]  Does patient have enough for the next 3 days? Yes [x] No []  Does patient have a follow-up appointment scheduled?  Yes [x] No []   If so when is appointment: 2/7/23

## 2022-11-22 ENCOUNTER — ANNUAL EXAM (OUTPATIENT)
Dept: OBGYN CLINIC | Facility: CLINIC | Age: 47
End: 2022-11-22

## 2022-11-22 VITALS
SYSTOLIC BLOOD PRESSURE: 132 MMHG | WEIGHT: 187 LBS | DIASTOLIC BLOOD PRESSURE: 82 MMHG | HEART RATE: 77 BPM | BODY MASS INDEX: 31.16 KG/M2 | HEIGHT: 65 IN

## 2022-11-22 DIAGNOSIS — Z01.419 ENCOUNTER FOR ANNUAL ROUTINE GYNECOLOGICAL EXAMINATION: Primary | ICD-10-CM

## 2022-11-22 DIAGNOSIS — Z12.31 ENCOUNTER FOR SCREENING MAMMOGRAM FOR MALIGNANT NEOPLASM OF BREAST: ICD-10-CM

## 2022-11-22 DIAGNOSIS — Z30.42 ENCOUNTER FOR SURVEILLANCE OF INJECTABLE CONTRACEPTIVE: ICD-10-CM

## 2022-11-22 RX ORDER — MEDROXYPROGESTERONE ACETATE 150 MG/ML
150 INJECTION, SUSPENSION INTRAMUSCULAR ONCE
Status: COMPLETED | OUTPATIENT
Start: 2022-11-22 | End: 2022-11-22

## 2022-11-22 RX ORDER — MEDROXYPROGESTERONE ACETATE 150 MG/ML
150 INJECTION, SUSPENSION INTRAMUSCULAR
Qty: 1 ML | Refills: 5 | Status: SHIPPED | OUTPATIENT
Start: 2022-11-22

## 2022-11-22 RX ADMIN — MEDROXYPROGESTERONE ACETATE 150 MG: 150 INJECTION, SUSPENSION INTRAMUSCULAR at 11:55

## 2022-11-22 NOTE — PROGRESS NOTES
Annual Exam    Assessment   1  Encounter for annual routine gynecological examination        2  Encounter for surveillance of injectable contraceptive  medroxyPROGESTERone acetate (DEPO-PROVERA SYRINGE) IM injection 150 mg    medroxyPROGESTERone (DEPO-PROVERA) 150 mg/mL injection      3  Encounter for screening mammogram for malignant neoplasm of breast  Mammo screening bilateral w 3d & cad        well woman       Plan       All questions answered  Breast self exam technique reviewed and patient encouraged to perform self-exam monthly  Contraception: Depo-Provera injections  Discussed healthy lifestyle modifications  Follow up in 1 year  Mammogram      Patient Instructions   Schedule mammogram after 3/30/2023  Call with needs or concerns  Return in 3 months for next [address] [facility] [phone]  Annual GYN exam is due after 1 year    Pt verbalized understanding of all discussed  Ken Jules is a 56 y o  [id number] female who presents for annual well woman exam  Periods are are not occurring with Depoprovera, lasting 0 days  No intermenstrual bleeding, spotting, or discharge  Last WNLP and negative HPV was 2020  1 partner x 28 years, denies domestic violence    Depression Screening Follow-up Plan: Patient's depression screening was negative with a PHQ-2 score of 0  Their PHQ-9 score was 2  Clinically patient does not have depression  No treatment is required        Current contraception: Depo-Provera injections  History of abnormal Pap smear: no  Family history of uterine or ovarian cancer: no  Regular self breast exam: yes  History of abnormal mammogram: no  Family history of breast cancer: yes - greastgrandmother  History of abnormal lipids: unknown  Menstrual History:  OB History        4    Para   2    Term   2            AB   2    Living   2       SAB        IAB   2    Ectopic        Multiple        Live Births   2                Menarche age: 6  No LMP recorded  (Menstrual status: Birth Control)  The following portions of the patient's history were reviewed and updated as appropriate: allergies, current medications, past family history, past medical history, past social history, past surgical history and problem list     Review of Systems  Pertinent items are noted in HPI        Objective      /82 (BP Location: Left arm, Patient Position: Sitting, Cuff Size: Adult)   Pulse 77   Ht 5' 5" (1 651 m)   Wt 84 8 kg (187 lb)   BMI 31 12 kg/m²     General: alert and oriented, in no acute distress, alert, appears stated age and cooperative   Heart: regular rate and rhythm, S1, S2 normal, no murmur, click, rub or gallop   Lungs: clear to auscultation bilaterally, WNL respiratory effort,negative cough or SOB   Thyroid: Negative masses   Abdomen: soft, non-tender, without masses or organomegaly   Vulva: normal   Vagina: normal mucosa   Cervix: no cervical motion tenderness and no lesions   Uterus: normal size, non-tender, normal shape and consistency   Adnexa: normal adnexa   Urethra: normal   Breasts: NT,negative masses, discharge, or dimpling

## 2022-11-22 NOTE — PATIENT INSTRUCTIONS
Schedule mammogram after 3/30/2023  Call with needs or concerns  Return in 3 months for next [address] [facility] [phone]  Annual GYN exam is due after 1 year    COVID-19 Instructions    If you are having any of the following:  Cough   Shortness of breath   Fever  If traveled within past 2 weeks internationally or to high risk [place] states  Or been in contact with someone that has     Please call either:   Your PCP office  [phone], option [phone]    They will screen you over the phone and direct you to the nearest appropriate testing location    DO NOT go to your PCP or OB office without calling first       Jameel Sanders

## 2022-12-16 DIAGNOSIS — F41.0 GENERALIZED ANXIETY DISORDER WITH PANIC ATTACKS: ICD-10-CM

## 2022-12-16 DIAGNOSIS — F41.1 GENERALIZED ANXIETY DISORDER WITH PANIC ATTACKS: ICD-10-CM

## 2023-01-13 DIAGNOSIS — F41.1 GENERALIZED ANXIETY DISORDER WITH PANIC ATTACKS: ICD-10-CM

## 2023-01-13 DIAGNOSIS — F41.0 GENERALIZED ANXIETY DISORDER WITH PANIC ATTACKS: ICD-10-CM

## 2023-01-13 RX ORDER — ALPRAZOLAM 1 MG/1
1 TABLET ORAL 3 TIMES DAILY PRN
Qty: 90 TABLET | Refills: 1 | Status: SHIPPED | OUTPATIENT
Start: 2023-01-13

## 2023-01-13 NOTE — TELEPHONE ENCOUNTER
Medication Refill Request     Name of Medication alprazolam  Dose/Frequency 1 mg three times a day  Quantity 90  Verified pharmacy   [x]  Verified ordering Provider   [x]  Does patient have enough for the next 3 days? Yes [x] No []  Does patient have a follow-up appointment scheduled? Yes [x] No []   If so when is appointment: 2/7/2023 at 2:30 p m

## 2023-01-17 ENCOUNTER — CONSULT (OUTPATIENT)
Dept: NEUROSURGERY | Facility: CLINIC | Age: 48
End: 2023-01-17

## 2023-01-17 VITALS
TEMPERATURE: 98.4 F | OXYGEN SATURATION: 98 % | HEIGHT: 65 IN | WEIGHT: 187.7 LBS | BODY MASS INDEX: 31.27 KG/M2 | SYSTOLIC BLOOD PRESSURE: 140 MMHG | HEART RATE: 87 BPM | RESPIRATION RATE: 18 BRPM | DIASTOLIC BLOOD PRESSURE: 76 MMHG

## 2023-01-17 DIAGNOSIS — R94.131 ABNORMAL EMG: ICD-10-CM

## 2023-01-17 DIAGNOSIS — M54.16 LUMBAR RADICULOPATHY: ICD-10-CM

## 2023-01-17 NOTE — ASSESSMENT & PLAN NOTE
Seen for new evaluation of LBP with LLE radiculopathy  · Ongoing for years, left-sided low back pain radiating to her hip and down the lateral thigh and calf to the foot with associated numbness and tingling as well as numbness and tingling in her foot  Denies BBI  No RLE complaints  · Exam: Mild low back tenderness to palpation, BLE 5/5 except left PF 4/5, LT intact, 2+ DTRs, no clonus    Imaging:  · MRI lumbar spine 9/21/22: Facet hypertrophy and small marginal osteophytes result in foraminal narrowing bilaterally at L3-4 and L4-5 most prominently on the right at L3-4  · XR 3/30/22: No acute osseous abnormality  Degenerative changes as described  · EMG 3/23/22: There is electrodiagnostic evidence of a subacute to chronic left L4 through S1 polyradiculopathy with ongoing denervation in the paraspinals suggestive of an active process  There is no electrodiagnostic evidence of a polyneuropathy     Plan:  · Reviewed imaging with patient  She has degenerative changes of her spine, more so on the right than left on her MRI though EMG suggests polyradiculopathy on the left  Her exam is benign except for slight weakness in left PF at 4/5  · Recommend conservative measures  · Referral placed for physical therapy for core and back strengthening  · Since she has not tried any medications for pain or injections referral placed for pain management for evaluation, consideration for facet blocks  States that she has gabapentin at home that was previously prescribed by her PCP, but she has not taken it  Advised that this may give her some relief in her neuropathic pain and she is agreeable to try  She will follow-up with PCP for titration of medication as needed  · If conservative management fails, or symptoms change or worsen advised would see back in the office at that time  Will likely need updated MRI prior visit  · Reviewed red flag signs and symptoms  · Follow-up as needed    Call with any questions or concerns

## 2023-01-17 NOTE — PROGRESS NOTES
Neurosurgery Office Note  Cortez Kirk 56 y o  female MRN: [id number]      Assessment/Plan     Lumbar radiculopathy  Seen for new evaluation of LBP with LLE radiculopathy  · Ongoing for years, left-sided low back pain radiating to her hip and down the lateral thigh and calf to the foot with associated numbness and tingling as well as numbness and tingling in her foot  Denies BBI  No RLE complaints  · Exam: Mild low back tenderness to palpation, BLE 5/5 except left PF 4/5, LT intact, 2+ DTRs, no clonus    Imaging:  · MRI lumbar spine 9/21/22: Facet hypertrophy and small marginal osteophytes result in foraminal narrowing bilaterally at L3-4 and L4-5 most prominently on the right at L3-4  · XR 3/30/22: No acute osseous abnormality  Degenerative changes as described  · EMG 3/23/22: There is electrodiagnostic evidence of a subacute to chronic left L4 through S1 polyradiculopathy with ongoing denervation in the paraspinals suggestive of an active process  There is no electrodiagnostic evidence of a polyneuropathy     Plan:  · Reviewed imaging with patient  She has degenerative changes of her spine, more so on the right than left on her MRI though EMG suggests polyradiculopathy on the left  Her exam is benign except for slight weakness in left PF at 4/5  · Recommend conservative measures  · Referral placed for physical therapy for core and back strengthening  · Since she has not tried any medications for pain or injections referral placed for pain management for evaluation, consideration for facet blocks  States that she has gabapentin at home that was previously prescribed by her PCP, but she has not taken it  Advised that this may give her some relief in her neuropathic pain and she is agreeable to try  She will follow-up with PCP for titration of medication as needed  · If conservative management fails, or symptoms change or worsen advised would see back in the office at that time    Will likely need updated MRI prior visit  · Reviewed red flag signs and symptoms  · Follow-up as needed  Call with any questions or concerns  Diagnoses and all orders for this visit:    Lumbar radiculopathy  -     Ambulatory Referral to Neurosurgery  -     Ambulatory referral to Pain Management; Future  -     Ambulatory Referral to Physical Therapy; Future    Abnormal EMG  -     Ambulatory Referral to Neurosurgery          I spent 45 minutes with the patient today in which >50% of the time was spent counseling/coordination of care regarding diagnosis, imaging review, symptoms and treatment plan  CHIEF COMPLAINT    Chief Complaint   Patient presents with   • Consult       HISTORY    History of Present Illness     56y o  year old female     16-year-old female seen for new evaluation of back pain and left lower extremity radiculopathy  She complains of mid and left-sided low back pain for years  This radiates to the left buttock and down the lateral left thigh and calf to the top of the foot with associated numbness and tingling  There is also numbness and tingling in the left toes  This started worsening in the last year prompting her to have an EMG, x-rays, and MRI  Her back pain is constant, currently 4/10  The leg pain is also constant, currently 5/10  Nothing makes her pain better  Unsure what makes it worse, but laying down and sleeping is difficult  She limps when she walks because of pain in her leg  Balance is okay  No bowel or bladder incontinence  She has not tried physical therapy, or been seen by pain management for medications or injections  See Discussion    REVIEW OF SYSTEMS    Review of Systems   Constitutional: Negative  HENT: Negative  Eyes: Positive for visual disturbance (ocassionally blurry vision)  Respiratory: Negative  Cardiovascular: Negative  Gastrointestinal: Positive for constipation (due to medication)  Genitourinary: Negative      Musculoskeletal: Positive for back pain (lower back pain radiates down left leg to top of foot) and gait problem (left leg pain legs gives out unsteady)  Negative for myalgias, neck pain and neck stiffness  Skin: Negative  Neurological: Positive for numbness (down left leg to foot)  Negative for dizziness, tremors, seizures, syncope, speech difficulty, weakness, light-headedness and headaches  Hematological: Does not bruise/bleed easily  Psychiatric/Behavioral: Positive for sleep disturbance (pain keeping patient up at night cant get comfortable)  Meds/Allergies     Current Outpatient Medications   Medication Sig Dispense Refill   • ALPRAZolam (XANAX) 1 mg tablet Take 1 tablet (1 mg total) by mouth 3 (three) times a day as needed for anxiety 90 tablet 1   • Buprenorphine HCl-Naloxone HCl 8 6-2 1 MG SUBL      • medroxyPROGESTERone (DEPO-PROVERA) 150 mg/mL injection Inject 1 mL (150 mg total) into a muscle every 3 (three) months 1 mL 5   • medroxyPROGESTERone acetate (DEPO-PROVERA SYRINGE) 150 mg/mL injection INJECT 1ML INTO THE MUSCLE EVERY 3 MONTHS 1 mL 0   • polyethylene glycol (GLYCOLAX) 17 GM/SCOOP powder        No current facility-administered medications for this visit         Allergies   Allergen Reactions   • Ciprofloxacin Anaphylaxis     Interacts with Zubsolv   • Codeine Lightheadedness     Reaction Date: 07Jun2011;    • Morphine Lightheadedness     Reaction Date: 07Jun2011;    • Sulfa Antibiotics Anaphylaxis and Rash   • Tramadol Lightheadedness       PAST HISTORY    Past Medical History:   Diagnosis Date   • Addiction to drug [facility])    • Anxiety    • Arthritis    • Depression    • Endometriosis     no current issues   • Infectious viral hepatitis     history of hep c   • Irritable bowel syndrome    • Substance abuse ([facility] [address] )        Past Surgical History:   Procedure Laterality Date   • APPENDECTOMY     • CHOLECYSTECTOMY     • HEMORRHOID SURGERY         Social History     Tobacco Use   • Smoking status: Former     Types: Cigarettes     Quit date: 3/14/2019     Years since quitting: 3 8   • Smokeless tobacco: Never   Vaping Use   • Vaping Use: Never used   Substance Use Topics   • Alcohol use: Yes     Comment: rarely/holidays   • Drug use: Not Currently     Types: Heroin, Cocaine       Family History   Problem Relation Age of Onset   • Cancer Father    • Drug abuse Father    • Drug abuse Mother    • No Known Problems Sister    • No Known Problems Daughter    • No Known Problems Maternal Grandmother    • Breast cancer Maternal Grandfather [de-identified]   • Prostate cancer Maternal Grandfather    • Melanoma Maternal Grandfather    • Colon cancer Maternal Grandfather 79   • No Known Problems Paternal Grandmother    • No Known Problems Paternal Grandfather    • No Known Problems Daughter    • No Known Problems Maternal Aunt          Above history personally reviewed  EXAM    Vitals:Blood pressure 140/76, pulse 87, temperature 98 4 °F (36 9 °C), resp  rate 18, height 5' 5" (1 651 m), weight 85 1 kg (187 lb 11 2 oz), SpO2 98 %  ,Body mass index is 31 23 kg/m²  Physical Exam  Vitals reviewed  Constitutional:       General: She is awake  Appearance: Normal appearance  HENT:      Head: Normocephalic and atraumatic  Eyes:      Conjunctiva/sclera: Conjunctivae normal    Cardiovascular:      Rate and Rhythm: Normal rate  Pulmonary:      Effort: Pulmonary effort is normal    Musculoskeletal:      Comments: Mild low back TTP   Skin:     General: Skin is warm and dry  Neurological:      Mental Status: She is alert and oriented to person, place, and time  Deep Tendon Reflexes:      Reflex Scores:       Patellar reflexes are 2+ on the right side and 2+ on the left side  Achilles reflexes are 2+ on the right side and 2+ on the left side    Psychiatric:         Attention and Perception: Attention and perception normal          Mood and Affect: Mood and affect normal          Speech: Speech normal          Behavior: Behavior normal  Behavior is cooperative  Thought Content: Thought content normal          Cognition and Memory: Cognition and memory normal          Judgment: Judgment normal          Neurologic Exam     Mental Status   Oriented to person, place, and time  Follows 2 step commands  Attention: normal  Concentration: normal    Speech: speech is normal   Level of consciousness: alert  Knowledge: good  Normal comprehension  Motor Exam   Muscle bulk: normal  Overall muscle tone: normal  RLE - 5/5 throughout  LLE - 5/5 except PF 4/5     Sensory Exam   Right leg light touch: normal  Left leg light touch: normal    Gait, Coordination, and Reflexes     Tremor   Resting tremor: absent  Intention tremor: absent  Action tremor: absent    Reflexes   Right patellar: 2+  Left patellar: 2+  Right achilles: 2+  Left achilles: 2+  Right ankle clonus: absent  Left ankle clonus: absent  Antalgic gait         MEDICAL DECISION MAKING    Imaging Studies:     No results found  I have personally reviewed pertinent reports     and I have personally reviewed pertinent films in PACS

## 2023-02-07 ENCOUNTER — OFFICE VISIT (OUTPATIENT)
Dept: PSYCHIATRY | Facility: CLINIC | Age: 48
End: 2023-02-07

## 2023-02-07 DIAGNOSIS — F41.0 GENERALIZED ANXIETY DISORDER WITH PANIC ATTACKS: Primary | ICD-10-CM

## 2023-02-07 DIAGNOSIS — F41.1 GENERALIZED ANXIETY DISORDER WITH PANIC ATTACKS: Primary | ICD-10-CM

## 2023-02-07 RX ORDER — ALPRAZOLAM 1 MG/1
TABLET ORAL
Qty: 75 TABLET | Refills: 1 | Status: SHIPPED | OUTPATIENT
Start: 2023-02-07

## 2023-02-07 NOTE — PSYCH
MEDICATION MANAGEMENT NOTE        [address] [place] [address]      Name and Date of Birth:  Codey Steele 56 y o  1975 MRN: [id number]    Date of Visit: February 7, 2023    Reason for Visit: Follow-up visit for medication management       SUBJECTIVE:    Codey Steele is a 56 y o  female with past psychiatric history significant for generalized anxiety disorder with panic attacks, opiate dependence (Rx Zub[facility]v via [facility]), insomnia, and extensive history of polysubstance abuse (cannabis, hallucinogens, cocaine) who was personally seen and evaluated today at the [address] [place] [address] outpatient clinic for follow-up and medication management  Ajay Wu presents as mildly anxious yet pleasant and cooperative  Her thoughts are linear and organized  She completes assessment without difficulty  Shereen endorses compliance with psychotropic medication regimen consisting of Xanax  She denies adverse medication side effects  Over the last 4 months, Ajay Wu reports adequate control of anxiety and mood  She denies new onset depressive symptomatology  Her sleep and appetite are at baseline  No active SI/HI  Ajay Wu has been more active as of late with increased responsibilities related to her grandson  As such, she has noticed uptick in more neuropathic and physical pain  She was evaluated by neurosurgery recently but has yet to start Gabapentin  Ajay Wu currently denies crying spells or anhedonia  She celebrated 5 years of sobriety in the fall and was applauded for her efforts  Ajay Wu reports episodic anxiety and worry but denies this to be overtly problematic  Over the last 1 month, Ajay Wu has slowly tapered Xanax from 1mg TID to 2mg afternoon, 0 5mg QHS  She was applauded for her efforts and states that she feels "about the same"  She denies pervasive and debilitating restlessness or nervousness  She is not tense or on-edge today   She admits to episodic panic symptoms but these do not occur daily  We discussed breathing techniques, such as the "15 second exercise" to naturally dissipate anxiety and stimulate the parasympathetic nervous system  She was receptive  During today's examination, Blanche Chin does not exhibit objective evidence of j carlos/hypomania or vickie psychosis  Blanche Chin denies recent ETOH or illicit substance abuse  She is future-oriented, detailing excitement regarding potential plans to travel to [facility] in April to see mother  She also voices optimism and excitement regarding dental surgery in July 2023  She offers no further concerns  Current Rating Scores:     None completed today      Review Of Systems:      Constitutional fluctuating energy level and as noted in HPI   ENT negative   Cardiovascular negative   Respiratory negative   Gastrointestinal negative   Genitourinary negative   Musculoskeletal back pain, leg pain, foot pain and joint pain   Integumentary negative   Neurological neuropathic pain   Endocrine negative   Other Symptoms none, all other systems are negative       Past Psychiatric History: (unchanged information from previous note copied and italicized) - Information that is bolded has been updated       Inpatient psychiatric admissions: Numerous past psychiatric admissions - last in 2017 at [address] [place] [address] for depression, SI in context of drug use  Prior outpatient psychiatric linkage: Numerous psychiatrists in the past - Last linkage with NP via [facility]  Past/current psychotherapy: History of psychotherapy linkage, no current therapists  History of suicidal attempts/gestures: Numerous attempts in the past including overdose and "cutting wrists"  History of violence/aggressive behaviors: Denies  Psychotropic medication trials: Lexapro, Prozac, Zoloft, Paxil, Remeron, Trazodone, Wellbutrin, BuSpar, VPA, Lithium, Risperdal, Seroquel, Gabapentin, Propranolol, Clonidine, Valium, Xanax, Klonopin, Adderall, Ativan, Librium, Provigil, Pristiq (too much GI discomfort)    Substance abuse inpatient/outpatient rehabilitation: 10+ rehab/detox admissions - first at the age of 17 in [facility] - last in 2017 at [facility]     Substance Abuse History: (unchanged information from previous note copied and italicized) - Information that is bolded has been updated      Extensive history of illict substance (cocaine, methamphetamine, cannabis, opiates, hallucinogen), and tobacco abuse  She denies a history of ETOH abuse  No past legal actions yet 1 prior arrest secondary to substance intoxication  The patient denies prior DWIs/DUIs  Extensive history of outpatient/inpatient rehabilitation programs  Shereen does not exhibit objective evidence of substance withdrawal during today's examination nor does Shereen appear under the influence of any psychoactive substance           Social History: (unchanged information from previous note copied and italicized) - Information that is bolded has been updated       Developmental: Denies a history of milestone/developmental delay  Denies a history of in-utero exposure to toxins/illicit substances  There is no documented history of IEP or need for special education    [address]  Marital history:   Living arrangement, social support: , has 2 children (daughter and son)  Occupational History: on permanent disability  Access to firearms: Denies direct access to weapons/firearms  Shereen Paris has no history of arrests or violence with a deadly weapon       Traumatic History: (unchanged information from previous note copied and italicized) - Information that is bolded has been updated       Abuse:none is reported  Other Traumatic Events: Father dying in 2017 was problematic       Past Medical History:    Past Medical History:   Diagnosis Date   • Addiction to drug [facility])    • Anxiety    • Arthritis    • Depression    • Endometriosis     no current issues   • Infectious viral hepatitis history of hep c   • Irritable bowel syndrome    • Substance abuse ([facility] [address] )         Past Surgical History:   Procedure Laterality Date   • APPENDECTOMY     • CHOLECYSTECTOMY     • HEMORRHOID SURGERY       Allergies   Allergen Reactions   • Ciprofloxacin Anaphylaxis     Interacts with Zubsolv   • Codeine Lightheadedness     Reaction Date: 07Jun2011;    • Morphine Lightheadedness     Reaction Date: 07Jun2011;    • Sulfa Antibiotics Anaphylaxis and Rash   • Tramadol Lightheadedness       Substance Abuse History:    Social History     Substance and Sexual Activity   Alcohol Use Yes    Comment: rarely/holidays     Social History     Substance and Sexual Activity   Drug Use Not Currently   • Types: Heroin, Cocaine       Social History:    Social History     Socioeconomic History   • Marital status: /Civil Union     Spouse name: Jesus Miners   • Number of children: 2   • Years of education: Not on file   • Highest education level: Not on file   Occupational History   • Occupation: homemaker   Tobacco Use   • Smoking status: Former     Types: Cigarettes     Quit date: 3/14/2019     Years since quitting: 3 9   • Smokeless tobacco: Never   Vaping Use   • Vaping Use: Never used   Substance and Sexual Activity   • Alcohol use: Yes     Comment: rarely/holidays   • Drug use: Not Currently     Types: Heroin, Cocaine   • Sexual activity: Yes     Partners: Male     Birth control/protection: Injection   Other Topics Concern   • Not on file   Social History Narrative   • Not on file     Social Determinants of Health     Financial Resource Strain: Low Risk    • Difficulty of Paying Living Expenses: Not hard at all   Food Insecurity: No Food Insecurity   • Worried About Running Out of Food in the Last Year: Never true   • Ran Out of Food in the Last Year: Never true   Transportation Needs: No Transportation Needs   • Lack of Transportation (Medical): No   • Lack of Transportation (Non-Medical):  No   Physical Activity: Not on file   Stress: No Stress Concern Present   • Feeling of Stress : Only a little   Social Connections: Not on file   Intimate Partner Violence: Not on file   Housing Stability: Low Risk    • Unable to Pay for Housing in the Last Year: No   • Number of Places Lived in the Last Year: 1   • Unstable Housing in the Last Year: No       Family Psychiatric History:     Family History   Problem Relation Age of Onset   • Cancer Father    • Drug abuse Father    • Drug abuse Mother    • No Known Problems Sister    • No Known Problems Daughter    • No Known Problems Maternal Grandmother    • Breast cancer Maternal Grandfather [de-identified]   • Prostate cancer Maternal Grandfather    • Melanoma Maternal Grandfather    • Colon cancer Maternal Grandfather 79   • No Known Problems Paternal Grandmother    • No Known Problems Paternal Grandfather    • No Known Problems Daughter    • No Known Problems Maternal Aunt        History Review: The following portions of the patient's history were reviewed and updated as appropriate: allergies, current medications, past family history, past medical history, past social history, past surgical history and problem list          OBJECTIVE:     Vital signs in last 24 hours: There were no vitals filed for this visit      Mental Status Evaluation:    Appearance age appropriate, casually dressed, dressed appropriately, looks stated age, tattooed   Behavior pleasant, cooperative, mildly anxious, good eye contact   Speech normal rate, normal volume, normal pitch   Mood euthymic   Affect normal range and intensity, appropriate   Thought Processes organized, logical, goal directed   Associations intact associations   Thought Content no overt delusions   Perceptual Disturbances: no auditory hallucinations, no visual hallucinations   Abnormal Thoughts  Risk Potential Suicidal ideation - None at present  Homicidal ideation - None at present  Potential for aggression - No   Orientation oriented to person, place, time/date and situation   Memory recent and remote memory grossly intact   Consciousness alert and awake   Attention Span Concentration Span attention span and concentration are age appropriate   Intellect appears to be of average intelligence   Insight intact and good   Judgement intact and good   Muscle Strength and  Gait normal gait and normal balance   Motor activity no abnormal movements   Language no difficulty naming common objects   Fund of Knowledge adequate knowledge of current events   Pain moderate   Pain Scale Did not ask patient to formally rate       Laboratory Results: I have personally reviewed all pertinent laboratory/tests results    Recent Labs (last 2 months):   No visits with results within 2 Month(s) from this visit     Latest known visit with results is:   Appointment on 10/14/2022   Component Date Value   • Hepatitis B Surface Ag 10/14/2022 Non-reactive    • Hepatitis C Ab 10/14/2022 High Reactive (A)    • Hep B C IgM 10/14/2022 Non-reactive    • Hep B Core Total Ab 10/14/2022 Non-reactive    • Ferritin 10/14/2022 41    • Sodium 10/14/2022 140    • Potassium 10/14/2022 3 8    • Chloride 10/14/2022 103    • CO2 10/14/2022 28    • ANION GAP 10/14/2022 9    • BUN 10/14/2022 16    • Creatinine 10/14/2022 0 69    • Glucose, Fasting 10/14/2022 92    • Calcium 10/14/2022 9 4    • AST 10/14/2022 22    • ALT 10/14/2022 17    • Alkaline Phosphatase 10/14/2022 54    • Total Protein 10/14/2022 7 6    • Albumin 10/14/2022 4 4    • Total Bilirubin 10/14/2022 0 32    • eGFR 10/14/2022 104    • WBC 10/14/2022 5 84    • RBC 10/14/2022 4 76    • Hemoglobin 10/14/2022 13 1    • Hematocrit 10/14/2022 40 8    • MCV 10/14/2022 86    • MCH 10/14/2022 27 5    • MCHC 10/14/2022 32 1    • RDW 10/14/2022 11 9    • MPV 10/14/2022 11 0    • Platelets 22/41/9411 225    • nRBC 10/14/2022 0    • Neutrophils Relative 10/14/2022 63    • Immat GRANS % 10/14/2022 0    • Lymphocytes Relative 10/14/2022 30    • Monocytes Relative 10/14/2022 5 • Eosinophils Relative 10/14/2022 2    • Basophils Relative 10/14/2022 0    • Neutrophils Absolute 10/14/2022 3 65    • Immature Grans Absolute 10/14/2022 0 01    • Lymphocytes Absolute 10/14/2022 1 76    • Monocytes Absolute 10/14/2022 0 30    • Eosinophils Absolute 10/14/2022 0 10    • Basophils Absolute 10/14/2022 0 02    • Magnesium 10/14/2022 2 1    • Cholesterol 10/14/2022 187    • Triglycerides 10/14/2022 66    • HDL, Direct 10/14/2022 50    • LDL Calculated 10/14/2022 124    • Non-HDL-Chol (CHOL-HDL) 10/14/2022 137    • TSH 3RD GENERATON 10/14/2022 3 130    • Free T4 10/14/2022 1 12    • Vit D, 25-Hydroxy 10/14/2022 57 3    • Vitamin B-12 10/14/2022 529        Suicide/Homicide Risk Assessment:      The following interventions are recommended: no intervention changes needed      Lethality Statement:    Based on today's assessment and clinical criteria, Ebony Perea contracts for safety and is not an imminent risk of harm to self or others  Outpatient level of care is deemed appropriate at this current time  Rossana Fuentes understands that if they can no longer contract for safety, they need to call the office or report to their nearest Emergency Room for immediate evaluation  Assessment/Plan:     Shereen Paris is a 47 y  o  female with past psychiatric history significant for generalized anxiety disorder with panic attacks, opiate dependence (Rx Zubsolv via [facility]), insomnia, and extensive history of polysubstance abuse (cannabis, hallucinogens, cocaine) who was personally seen and evaluated today at the [address] [place] [address] [address] outpatient clinic for follow-up and medication management  Shereen endorses a longstanding history of anxiety and episodic depression that served as the catalyst for her substance abuse  She states that her biological parents abused illicit substances and thus, they were emotionally neglectful and inconsistent   At the age of 16, Rossana Fuentes began using cannabis which ultimately lead to use of numerous illicit substances  Shereen reports placement in 10+ detox/rehabilitation programs throughout the course of her life  Her first rehabilitation program was at age 17 in [facility]  She last attended rehab via [facility] in 2017, following the death of her father and and subsequent inpatient admission at [facility]  She endorses sobriety from opiates and other illicit substances for the last 3+ years  She was recently psychiatrically managed via CRNP Mrs Rashaad Byrnes through [facility]  However, given their change in policy regarding concurrent use of opiates and benzodiazepines, Adria Barakat was transferred to [facility] for safer, more appropriate tapering process  Adria Barakat has been treated with benzodiazepines since her early 20's  She was previously on Xanax 6mg Daily (2mg TID) but was recently tapered to 4mg Daily (2mg AM, 1mg afternoon, 1mg QHS) over the last 8 months  PDMP webiste reviewed, no concerns for abuse or misuse  I spoke at length with Adria Barakat about 's policy regarding concurrent use of opiates and benzodiazepines and risks/alternatives to treatment  She voiced understanding regarding the need to taper off Xanax and was agreeable to do so  I also discussed possibility of faster tapering process via detox program at [facility], to which she was resistant given responsibility of caring for her family  Shereen denies most neurovegetative symptomatology suggestive of major depressive disorder  Shereen adamantly denies acute thoughts of suicide or self-harm  Shereen has no plans to harm others  There is a documented history of prior suicidal gestures/suicidal attempts  Shereen admits to overdosing and "cutting her wrists" in the past  She has not harmed herself or attempted to end her life in 4+ years  Shereen endorses a historical struggle with anxiety and symptomatology consistent with generalized anxiety disorder   Shereen reports anxiety that is pathologic in nature  Shereen endorses excessive nervousness, irrational worry, and overt anxiousness  Shereen is not pervasively restless or tense but does report feeling episodically keyed-up and on-edge  Shereen experiences disruption in energy and concentration secondary to anxiety  There is evidence to suggest that Shereen experiences irritability, inability to relax, and disruption in sleep in the past, secondary to pathologic anxiety  Shereen denies new-onset panic symptomatology but does report a history of panic attacks characterized by tremor, SOB, fear of impending doom, and overt anxiousness  She denies maladaptive behaviors  Shereen vehemently denies any acute or chronic history suggestive of an underlying affective (bipolar) organization  Shereen denies historical symptomatology suggestive of an underlying psychotic process  Shereen denies historical symptomatology suggestive of PTSD, OCD, or disordered eating          Today's Plan:     Psychopharmacologically, Shereen reports benefit and tolerability associated with current regimen  She denies lethality concern or adverse side effects  She was agreeable to continue slow tapering of Xanax as she recently reduced dose and denies new onset or worsening of anxiety   As such, no acute changes are warranted       DSM-V Diagnoses:      1 ) Generalized Anxiety Disorder with Panic Attacks   2 ) Opiate dependence, continuous  3 ) Polysubstance abuse by history   4 ) Insomnia        Treatment Recommendations/Precautions:     1 ) Generalized Anxiety Disorder with Panic Attacks   - Taper Xanax 1mg TID to 2mg afternoon, 0 5mg QHS (total of 0 5mg reduction) - tapering process in effect (was previously on high doses)              - I inherited Shereen on high-dose Xanax - plan has been to ultimately taper off Xanax given concurrent use of Suboxone, however, Christopher Bruce has been on BZ for 20+ years so tapering process will be arduous   - Rx Gabapentin 100mg BID (rx by another physician)   - Psychoeducation provided regarding the importance of exercise and healthy dietary choices and their impact on mood, energy, and motivation  - Counseled to avoid ETOH, illict substances, and nicotine secondary to the detrimental effects of these substances on mental and physical health  - Encouraged to engage in non-verbal forms of therapy such as art therapy, music therapy, and mindfulness  - Discussed the bio-psycho-social model to treatment and therapeutic exercises/interventions were attempted to cognitively restructure thoughts     2 ) Opiate dependence, continuous  - Rx [facility]v 8 [facility]       3  ) Polysubstance abuse by history   - Currently sober  - Counseled to avoid ETOH, illict substances, and nicotine secondary to the detrimental effects of these substances on mental and physical health     4 ) Insomnia  - Discontinue Promethazine 25mg QHS PRN - no longer taking      Medication management every 4 months  Aware of need to follow up with family physician for medical issues  Aware of 24 hour and weekend coverage for urgent situations accessed by calling [facility] main practice number    Medications Risks/Benefits      Risks, Benefits And Possible Side Effects Of Medications:    Risks, benefits, and possible side effects of medications explained to [facility] including risks of misuse, abuse or dependence, sedation and respiratory depression related to treatment with benzodiazepine medications  She verbalizes understanding and agreement for treatment  Controlled Medication Discussion:     [facility] has been filling controlled prescriptions on time as prescribed according to [facility]  Discussed with [facility] the risks of sedation, respiratory depression, impairment of ability to drive and potential for abuse and addiction related to treatment with benzodiazepine medications   She understands risk of treatment with benzodiazepine medications, agrees to not drive if feels impaired and agrees to take medications as prescribed  Discussed with [facility] warning on concurrent use of benzodiazepines and opioid medications including sedation, respiratory depression, coma and death  She understands the risk of treatment with benzodiazepines in addition to opioids and wants to continue taking those medications    Psychotherapy Provided:     Individual psychotherapy provided: No     Treatment Plan:    Completed and signed during the session: Yes - with Shereen      Visit Time    Visit Start Time: 2:30 PM  Visit Stop Time: 3:00 PM  Total Visit Duration: 30 minutes     The total visit duration detailed above includes: patient engagement, medication management, psychotherapy/counseling, discussion regarding treatment goals, and coordination of care  Note Share Disclaimer:      This note was not shared with the patient due to reasonable likelihood of causing patient harm      Tabatha Dominique MD  Board Certified Diplomate of the American Board of Psychiatry and Neurology  02/07/23

## 2023-02-07 NOTE — BH TREATMENT PLAN
TREATMENT PLAN (Medication Management Only)        [address]    Name and Date of Birth:  Cortez Kirk 56 y o  1975  Date of Treatment Plan: February 7, 2023  Diagnosis/Diagnoses:    1  Generalized anxiety disorder with panic attacks      Strengths/Personal Resources for Self-Care: supportive family, supportive friends, ability to adapt to life changes, ability to communicate needs, ability to communicate well, good physical health, good understanding of illness, independence  Area/Areas of need (in own words): anxiety symptoms  1  Long Term Goal: maintain control of anxiety  Target Date:6 months - 8/7/2023  Person/Persons responsible for completion of goal: Shereen  2  Short Term Objective (s) - How will we reach this goal?:   A  Provider new recommended medication/dosage changes and/or continue medication(s): continue current medications as prescribed  B  Attend medication management appointments regularly  C  Take psychiatric medications responsibly  D  Continue to care for my grandchild - increase to 4 days per week  E  Maintain sobriety   Target Date:6 months - 8/7/2023  Person/Persons Responsible for Completion of Goal: Shereen  Progress Towards Goals: continuing treatment  Treatment Modality: medication management every 3 months  Review due 180 days from date of this plan: 6 months - 8/7/2023  Expected length of service: ongoing treatment  My Physician/PA/NP and I have developed this plan together and I agree to work on the goals and objectives  I understand the treatment goals that were developed for my treatment  Treatment Plan completed with assistance and input from patient and verbal consent provided  Treatment plan was not signed at time of office visit secondary to COVID-19 social distancing guidelines

## 2023-03-22 ENCOUNTER — OFFICE VISIT (OUTPATIENT)
Dept: URGENT CARE | Age: 48
End: 2023-03-22

## 2023-03-22 VITALS
BODY MASS INDEX: 31.65 KG/M2 | SYSTOLIC BLOOD PRESSURE: 132 MMHG | OXYGEN SATURATION: 98 % | WEIGHT: 190 LBS | TEMPERATURE: 97.2 F | RESPIRATION RATE: 18 BRPM | DIASTOLIC BLOOD PRESSURE: 88 MMHG | HEIGHT: 65 IN | HEART RATE: 115 BPM

## 2023-03-22 DIAGNOSIS — N30.01 ACUTE CYSTITIS WITH HEMATURIA: Primary | ICD-10-CM

## 2023-03-22 LAB
SL AMB  POCT GLUCOSE, UA: NEGATIVE
SL AMB LEUKOCYTE ESTERASE,UA: ABNORMAL
SL AMB POCT BILIRUBIN,UA: NEGATIVE
SL AMB POCT BLOOD,UA: ABNORMAL
SL AMB POCT CLARITY,UA: ABNORMAL
SL AMB POCT COLOR,UA: ABNORMAL
SL AMB POCT KETONES,UA: 5
SL AMB POCT NITRITE,UA: NEGATIVE
SL AMB POCT PH,UA: 6
SL AMB POCT SPECIFIC GRAVITY,UA: 1.03
SL AMB POCT URINE PROTEIN: 30
SL AMB POCT UROBILINOGEN: 0.2

## 2023-03-22 RX ORDER — CEPHALEXIN 500 MG/1
500 CAPSULE ORAL EVERY 8 HOURS SCHEDULED
Qty: 30 CAPSULE | Refills: 0 | Status: SHIPPED | OUTPATIENT
Start: 2023-03-22 | End: 2023-03-27

## 2023-03-22 NOTE — PROGRESS NOTES
[facility]        NAME: Kimberly Kaplan is a 53 y o  female  : 1975    MRN: [id number]  DATE: 2023  TIME: 1:13 PM    /88   Pulse (!) 115   Temp (!) 97 2 °F (36 2 °C)   Resp 18   Ht 5' 5" (1 651 m)   Wt 86 2 kg (190 lb)   LMP  (LMP Unknown) Comment: depo inj  SpO2 98%   BMI 31 62 kg/m²     Assessment and Plan   Acute cystitis with hematuria [N30 01]  1  Acute cystitis with hematuria  POCT urine dip    Urine culture    cephalexin (KEFLEX) 500 mg capsule            Patient Instructions       Follow up with PCP in 3-5 days  Proceed to  ER if symptoms worsen  Chief Complaint     Chief Complaint   Patient presents with   • Possible UTI     Started yest w/ dysuria, oliguria and blood in urine  +low abd pressure         History of Present Illness       Pt with burning with urine starting yesterday, today blood in urine an pelvis pressure       Review of Systems   Review of Systems   Constitutional: Negative  HENT: Negative  Eyes: Negative  Respiratory: Negative  Cardiovascular: Negative  Gastrointestinal: Negative  Endocrine: Negative  Genitourinary: Positive for dysuria  Musculoskeletal: Negative  Skin: Negative  Allergic/Immunologic: Negative  Neurological: Negative  Hematological: Negative  Psychiatric/Behavioral: Negative  All other systems reviewed and are negative          Current Medications       Current Outpatient Medications:   •  ALPRAZolam (XANAX) 1 mg tablet, Take 2mg each afternoon and 0 5mg each evening , Disp: 75 tablet, Rfl: 1  •  Buprenorphine HCl-Naloxone HCl 8 6-2 1 MG SUBL, , Disp: , Rfl:   •  cephalexin (KEFLEX) 500 mg capsule, Take 1 capsule (500 mg total) by mouth every 8 (eight) hours for 10 days, Disp: 30 capsule, Rfl: 0  •  medroxyPROGESTERone (DEPO-PROVERA) 150 mg/mL injection, Inject 1 mL (150 mg total) into a muscle every 3 (three) months, Disp: 1 mL, Rfl: 3  •  medroxyPROGESTERone acetate (DEPO-PROVERA SYRINGE) 150 mg/mL injection, INJECT 1ML INTO THE MUSCLE EVERY 3 MONTHS, Disp: 1 mL, Rfl: 0  •  polyethylene glycol (GLYCOLAX) 17 GM/SCOOP powder, , Disp: , Rfl:   •  medroxyPROGESTERone (DEPO-PROVERA) 150 mg/mL injection, Inject 1 mL (150 mg total) into a muscle every 3 (three) months (Patient not taking: Reported on 3/22/2023), Disp: 1 mL, Rfl: 5    Current Facility-Administered Medications:   •  medroxyPROGESTERone acetate (DEPO-PROVERA SYRINGE) IM injection 150 mg, 150 mg, Intramuscular, Q3 Months, Flavio Salcido MD, 150 mg at 02/07/23 1034    Current Allergies     Allergies as of 03/22/2023 - Reviewed 03/22/2023   Allergen Reaction Noted   • Ciprofloxacin Anaphylaxis 01/07/2019   • Codeine Lightheadedness 08/28/2001   • Morphine Lightheadedness 08/28/2001   • Sulfa antibiotics Anaphylaxis and Rash 09/28/2017   • Tramadol Lightheadedness 09/28/2017            The following portions of the patient's history were reviewed and updated as appropriate: allergies, current medications, past family history, past medical history, past social history, past surgical history and problem list      Past Medical History:   Diagnosis Date   • Addiction to drug [facility])    • Anxiety    • Arthritis    • Depression    • Endometriosis     no current issues   • Infectious viral hepatitis     history of hep c   • Irritable bowel syndrome    • Substance abuse ([facility] [address] )        Past Surgical History:   Procedure Laterality Date   • APPENDECTOMY     • CHOLECYSTECTOMY     • HEMORRHOID SURGERY         Family History   Problem Relation Age of Onset   • Cancer Father    • Drug abuse Father    • Drug abuse Mother    • No Known Problems Sister    • No Known Problems Daughter    • No Known Problems Maternal Grandmother    • Breast cancer Maternal Grandfather [de-identified]   • Prostate cancer Maternal Grandfather    • Melanoma Maternal Grandfather    • Colon cancer Maternal Grandfather 79   • No Known Problems Paternal Grandmother    • No Known Problems Paternal Grandfather    • No Known Problems Daughter    • No Known Problems Maternal Aunt          Medications have been verified  Objective   /88   Pulse (!) 115   Temp (!) 97 2 °F (36 2 °C)   Resp 18   Ht 5' 5" (1 651 m)   Wt 86 2 kg (190 lb)   LMP  (LMP Unknown) Comment: depo inj  SpO2 98%   BMI 31 62 kg/m²        Physical Exam     Physical Exam  Vitals and nursing note reviewed  Constitutional:       Appearance: Normal appearance  She is normal weight  HENT:      Head: Normocephalic and atraumatic  Right Ear: Tympanic membrane, ear canal and external ear normal       Left Ear: Tympanic membrane, ear canal and external ear normal       Nose: Nose normal       Mouth/Throat:      Mouth: Mucous membranes are moist       Pharynx: Oropharynx is clear  Eyes:      Extraocular Movements: Extraocular movements intact  Conjunctiva/sclera: Conjunctivae normal       Pupils: Pupils are equal, round, and reactive to light  Cardiovascular:      Rate and Rhythm: Normal rate and regular rhythm  Pulses: Normal pulses  Heart sounds: Normal heart sounds  Pulmonary:      Effort: Pulmonary effort is normal       Breath sounds: Normal breath sounds  Abdominal:      General: Abdomen is flat  Bowel sounds are normal       Palpations: Abdomen is soft  Comments: Suprapubic pressure    Musculoskeletal:         General: Normal range of motion  Cervical back: Normal range of motion and neck supple  Skin:     General: Skin is warm  Capillary Refill: Capillary refill takes less than 2 seconds  Neurological:      General: No focal deficit present  Mental Status: She is alert and oriented to person, place, and time     Psychiatric:         Mood and Affect: Mood normal          Behavior: Behavior normal

## 2023-03-24 ENCOUNTER — NURSE TRIAGE (OUTPATIENT)
Dept: OTHER | Facility: OTHER | Age: 48
End: 2023-03-24

## 2023-03-24 LAB — BACTERIA UR CULT: ABNORMAL

## 2023-03-24 NOTE — TELEPHONE ENCOUNTER
Regarding: medication request  ----- Message from Yadira Sanders sent at 3/24/2023  6:03 PM EDT -----  " My test results for my urinalysis came in and shows that the antibiotic I was given does not help the bacteria that was found   Can a different antibiotic be called in?"

## 2023-03-24 NOTE — TELEPHONE ENCOUNTER
pt was seen at [facility] on 3/22 for UTI  pt was given Keflex  U cx resulted with >100,000 cfu/ml Enterococcus faecalis  pt would like to know if she should remain of keflex or be started on a different abx  Allergies listed to Sulfa abx and Ciprofloaxin  On call paged  Per on call pt to remain of Keflex at this time and increase water intake  Call office on Monday if symptoms remain  Pt verbalized understanding  Reason for Disposition  • Preventing urinary tract infections (UTIs), questions about    Answer Assessment - Initial Assessment Questions  1  ANTIBIOTIC: "What antibiotic are you taking?" "How many times per day?"      Keflex  2  DURATION: "When was the antibiotic started?"    Started 3/22 - 500mg q8 for 10 days  3   MAIN SYMPTOM: "What is the main symptom you are concerned about?"   UTI    Protocols used: URINARY TRACT INFECTION ON ANTIBIOTIC FOLLOW-UP CALL [facility]

## 2023-03-27 ENCOUNTER — OFFICE VISIT (OUTPATIENT)
Dept: FAMILY MEDICINE CLINIC | Facility: CLINIC | Age: 48
End: 2023-03-27

## 2023-03-27 ENCOUNTER — TELEPHONE (OUTPATIENT)
Dept: FAMILY MEDICINE CLINIC | Facility: CLINIC | Age: 48
End: 2023-03-27

## 2023-03-27 VITALS
TEMPERATURE: 97.8 F | HEART RATE: 96 BPM | WEIGHT: 192 LBS | HEIGHT: 65 IN | DIASTOLIC BLOOD PRESSURE: 78 MMHG | OXYGEN SATURATION: 98 % | BODY MASS INDEX: 31.99 KG/M2 | RESPIRATION RATE: 14 BRPM | SYSTOLIC BLOOD PRESSURE: 124 MMHG

## 2023-03-27 DIAGNOSIS — Z12.31 SCREENING MAMMOGRAM FOR BREAST CANCER: ICD-10-CM

## 2023-03-27 DIAGNOSIS — N39.0 ACUTE UTI: ICD-10-CM

## 2023-03-27 DIAGNOSIS — Z12.11 SCREEN FOR COLON CANCER: Primary | ICD-10-CM

## 2023-03-27 RX ORDER — DOXYCYCLINE HYCLATE 100 MG/1
100 CAPSULE ORAL EVERY 12 HOURS SCHEDULED
Qty: 14 CAPSULE | Refills: 0 | Status: SHIPPED | OUTPATIENT
Start: 2023-03-27 | End: 2023-04-03

## 2023-03-27 RX ORDER — FLUCONAZOLE 150 MG/1
150 TABLET ORAL ONCE
Qty: 1 TABLET | Refills: 0 | Status: SHIPPED | OUTPATIENT
Start: 2023-03-27 | End: 2023-03-27

## 2023-03-27 NOTE — PROGRESS NOTES
Name: Brenda Hawkins      : 1975      MRN: [id number]  Encounter Provider: Brandon Michaud MD  Encounter Date: 3/27/2023   Encounter department: [address] [place] [address]     1  Screen for colon cancer  -     Cologuard    2  Acute UTI  -     doxycycline hyclate (VIBRAMYCIN) 100 mg capsule; Take 1 capsule (100 mg total) by mouth every 12 (twelve) hours for 7 days With food/Meals  -     fluconazole (DIFLUCAN) 150 mg tablet; Take 1 tablet (150 mg total) by mouth once for 1 dose    3  Screening mammogram for breast cancer    Life style mod  RTC in 10-14 days       Subjective      [address] is here for Regular check up, and has Urinary symptoms, she was given course of Keflex, NOT improved,  Review of Systems   Constitutional: Negative for chills, fatigue and fever  HENT: Negative for congestion, facial swelling, sore throat, trouble swallowing and voice change  Eyes: Negative for pain, discharge and visual disturbance  Respiratory: Negative for cough, shortness of breath and wheezing  Cardiovascular: Negative for chest pain, palpitations and leg swelling  Gastrointestinal: Negative for abdominal pain, blood in stool, constipation, diarrhea and nausea  Endocrine: Negative for polydipsia, polyphagia and polyuria  Genitourinary: Positive for dysuria, frequency and pelvic pain  Negative for difficulty urinating, hematuria and urgency  Musculoskeletal: Negative for arthralgias and myalgias  Skin: Negative for rash  Neurological: Negative for dizziness, tremors, weakness and headaches  Hematological: Negative for adenopathy  Does not bruise/bleed easily  Psychiatric/Behavioral: Negative for dysphoric mood, sleep disturbance and suicidal ideas  Current Outpatient Medications on File Prior to Visit   Medication Sig   • ALPRAZolam (XANAX) 1 mg tablet Take 2mg each afternoon and 0 5mg each evening     • Buprenorphine HCl-Naloxone HCl 8 6-2 1 "MG SUBL    • medroxyPROGESTERone (DEPO-PROVERA) 150 mg/mL injection Inject 1 mL (150 mg total) into a muscle every 3 (three) months   • polyethylene glycol (GLYCOLAX) 17 GM/SCOOP powder    • [DISCONTINUED] cephalexin (KEFLEX) 500 mg capsule Take 1 capsule (500 mg total) by mouth every 8 (eight) hours for 10 days   • [DISCONTINUED] medroxyPROGESTERone (DEPO-PROVERA) 150 mg/mL injection Inject 1 mL (150 mg total) into a muscle every 3 (three) months (Patient not taking: Reported on 3/22/2023)   • [DISCONTINUED] medroxyPROGESTERone acetate (DEPO-PROVERA SYRINGE) 150 mg/mL injection INJECT 1ML INTO THE MUSCLE EVERY 3 MONTHS (Patient not taking: Reported on 3/27/2023)       Objective     /78 (BP Location: Left arm, Patient Position: Sitting, Cuff Size: Standard)   Pulse 96   Temp 97 8 °F (36 6 °C)   Resp 14   Ht 5' 5\" (1 651 m)   Wt 87 1 kg (192 lb)   LMP  (LMP Unknown) Comment: depo inj  SpO2 98%   BMI 31 95 kg/m²     Physical Exam  Constitutional:       General: She is not in acute distress  HENT:      Head: Normocephalic  Mouth/Throat:      Pharynx: No oropharyngeal exudate  Eyes:      General: No scleral icterus  Conjunctiva/sclera: Conjunctivae normal       Pupils: Pupils are equal, round, and reactive to light  Neck:      Thyroid: No thyromegaly  Cardiovascular:      Rate and Rhythm: Normal rate and regular rhythm  Heart sounds: Normal heart sounds  No murmur heard  Pulmonary:      Effort: Pulmonary effort is normal  No respiratory distress  Breath sounds: Normal breath sounds  No wheezing or rales  Abdominal:      General: Bowel sounds are normal  There is no distension  Palpations: Abdomen is soft  Tenderness: There is abdominal tenderness  There is no guarding or rebound  Musculoskeletal:         General: No tenderness  Cervical back: Neck supple  Lymphadenopathy:      Cervical: No cervical adenopathy  Skin:     Coloration: Skin is not pale        " Findings: No rash  Neurological:      Mental Status: She is alert and oriented to person, place, and time  Sensory: No sensory deficit  Motor: No weakness         Dal Fothergill, MD

## 2023-03-27 NOTE — TELEPHONE ENCOUNTER
Pt was seen in Urgent care on 3/22/2023, she was prescribed Keflex 500mg  Antibiotic is not working  She is still have symptoms, and would like a different antibiotic called into pharmacy  please advise

## 2023-04-07 DIAGNOSIS — F41.0 GENERALIZED ANXIETY DISORDER WITH PANIC ATTACKS: ICD-10-CM

## 2023-04-07 DIAGNOSIS — F41.1 GENERALIZED ANXIETY DISORDER WITH PANIC ATTACKS: ICD-10-CM

## 2023-04-07 RX ORDER — ALPRAZOLAM 1 MG/1
TABLET ORAL
Qty: 75 TABLET | Refills: 1 | Status: SHIPPED | OUTPATIENT
Start: 2023-04-07

## 2023-04-07 NOTE — TELEPHONE ENCOUNTER
Medication Refill Request     Name of Medication XANAX   Dose/Frequency 1MB/take 2mg afternoon and 0 5mg evening  Quantity 75 tablets  Verified pharmacy   [x]  Verified ordering Provider   [x]  Does patient have enough for the next 3 days? Yes [x] No []  Does patient have a follow-up appointment scheduled?  Yes [x] No []   If so when is appointment: 6/7

## 2023-04-07 NOTE — TELEPHONE ENCOUNTER
PDMP website reviewed  Emily Marley has been appropriately adherent to controlled psychotropic medications without evidence of abuse or misuse  As such, will send 30-day refill to pharmacy of choice and follow up as necessary

## 2023-04-26 ENCOUNTER — CLINICAL SUPPORT (OUTPATIENT)
Dept: OBGYN CLINIC | Facility: CLINIC | Age: 48
End: 2023-04-26

## 2023-04-26 DIAGNOSIS — Z30.09 UNWANTED FERTILITY: ICD-10-CM

## 2023-04-26 RX ADMIN — MEDROXYPROGESTERONE ACETATE 150 MG: 150 INJECTION, SUSPENSION INTRAMUSCULAR at 11:01

## 2023-06-02 DIAGNOSIS — F41.1 GENERALIZED ANXIETY DISORDER WITH PANIC ATTACKS: Primary | ICD-10-CM

## 2023-06-02 DIAGNOSIS — F41.0 GENERALIZED ANXIETY DISORDER WITH PANIC ATTACKS: Primary | ICD-10-CM

## 2023-06-02 RX ORDER — ALPRAZOLAM 1 MG/1
TABLET ORAL
Qty: 75 TABLET | Refills: 0 | Status: SHIPPED | OUTPATIENT
Start: 2023-06-06

## 2023-06-02 NOTE — TELEPHONE ENCOUNTER
Medication Refill Request     Name of Medication Xanax  Dose/Frequency 1mg take 2mg each afternoon and 0 5mg each evening  Quantity 75  Verified pharmacy   [x]  Verified ordering Provider   [x]  Does patient have enough for the next 3 days? Yes [x] No []  Does patient have a follow-up appointment scheduled?  Yes [x] No []   If so when is appointment: 6/7/2023 11am

## 2023-06-07 ENCOUNTER — OFFICE VISIT (OUTPATIENT)
Dept: PSYCHIATRY | Facility: CLINIC | Age: 48
End: 2023-06-07

## 2023-06-07 DIAGNOSIS — F51.04 PSYCHOPHYSIOLOGICAL INSOMNIA: ICD-10-CM

## 2023-06-07 DIAGNOSIS — F41.1 GENERALIZED ANXIETY DISORDER WITH PANIC ATTACKS: Primary | ICD-10-CM

## 2023-06-07 DIAGNOSIS — F41.0 GENERALIZED ANXIETY DISORDER WITH PANIC ATTACKS: Primary | ICD-10-CM

## 2023-06-07 NOTE — BH TREATMENT PLAN
TREATMENT PLAN (Medication Management Only)        [facility]    Name and Date of Birth:  Lester Black 53 y o  1975  Date of Treatment Plan: June 7, 2023  Diagnosis/Diagnoses:    1  Generalized anxiety disorder with panic attacks    2  Psychophysiological insomnia      Strengths/Personal Resources for Self-Care: supportive family, supportive friends, taking medications as prescribed, ability to adapt to life changes, ability to communicate needs, ability to communicate well, ability to listen, ability to reason, general fund of knowledge, good physical health, good understanding of illness, independence, motivation for treatment, ability to negotiate basic needs  Area/Areas of need (in own words): anxiety symptoms  1  Long Term Goal: maintain control of anxiety  Target Date:6 months - 12/7/2023  Person/Persons responsible for completion of goal: Shereen  2  Short Term Objective (s) - How will we reach this goal?:   A  Provider new recommended medication/dosage changes and/or continue medication(s): continue current medications as prescribed  B  Attend medication management appointments regularly  C  Take psychiatric medications responsibly  D  See my pain doctor by next visit  Rayshawn Oconnor more time with grandjillian   Target Date:6 months - 12/7/2023  Person/Persons Responsible for Completion of Goal: Shereen  Progress Towards Goals: stable, continuing treatment  Treatment Modality: medication management every 3 months  Review due 180 days from date of this plan: 6 months - 12/7/2023  Expected length of service: ongoing treatment  My Physician/PA/NP and I have developed this plan together and I agree to work on the goals and objectives  I understand the treatment goals that were developed for my treatment  Treatment Plan completed with assistance and input from patient and verbal consent provided   Treatment plan was not signed at time of office visit secondary to COVID-19 social distancing guidelines

## 2023-06-07 NOTE — PSYCH
MEDICATION MANAGEMENT NOTE        [facility]      Name and Date of Birth:  Ronel Flroes 53 y o  1975 MRN: [id number]    Date of Visit: June 7, 2023    Reason for Visit: Follow-up visit for medication management       SUBJECTIVE:    Ronel Flores is a 53 y o  female with past psychiatric history significant for generalized anxiety disorder with panic attacks, opiate dependence (Rx Zub[facility]v via [facility]), insomnia, and extensive history of polysubstance abuse (cannabis, hallucinogens, cocaine) who was personally seen and evaluated today at the [facility] outpatient clinic for follow-up and medication management  Miryam Mc presents as mildly anxious yet pleasant and cooperative  Her thoughts are organized and linear  She completes assessment without difficulty  Shereen endorses compliance with psychotropic medication regimen consisting of Xanax  She denies adverse medication side effects  PDMP website reviewed, no acute concerns for abuse or misuse  Shereen reports psychiatric stability since last visit  She continues to enjoy spending time with her grandson  She is set for upcoming oral surgery in July and speaks to ways this will improve her self-confidence  As a result of her dental issues, she has been more isolative over the last 12+ months  She speaks to excitement regarding being confident enough to attend her 's events once surgery is completed  Acutely, Shereen reports erratic sleep secondary to chronic pain  She has yet to follow-up with pain specialists and psychological barriers were discussed today  She was encouraged to set a rigid deadline regarding follow up to avoid additional suffering  She has yet to start Gabapentin which was provided by that service  Miryam Mc currently denies SI/HI  Her appetite is stable  No anhedonia or crying spells  She is without hopelessness or feelings of despair   She reports recent anxiety "and worry regarding her Zubsolv script  She met with a different provider this past month who was devaluing  Supportive therapy and joint problem solving utilized  Danny Ohara is not restless today but appears mildly more tense and on-edge regarding this recent appointment  She denies excessive panic symptoms  During today's examination, Cheryle Mellow does not exhibit objective evidence of j carlos/hypomania or vickie psychosis  Cheryle Mellow denies recent ETOH or illicit substance abuse  At the moment, Cheryle Mellow rates her overall state of MH as a \"7-8/10\" (10 being the best she's ever felt, 0 being the worst)  She denies any further concerns  Current Rating Scores:     None completed today      Review Of Systems:      Constitutional fluctuating energy level and as noted in HPI   ENT Poor dentition    Cardiovascular negative   Respiratory negative   Gastrointestinal negative   Genitourinary negative   Musculoskeletal back pain and leg pain   Integumentary negative   Neurological neuropathic pain   Endocrine negative   Other Symptoms none, all other systems are negative       Past Psychiatric History: (unchanged information from previous note copied and italicized) - Information that is bolded has been updated       Inpatient psychiatric admissions: Numerous past psychiatric admissions - last in 2017 at [address] [place] [address] for depression, SI in context of drug use  Prior outpatient psychiatric linkage: Numerous psychiatrists in the past - Last linkage with [facility] via [facility]  Past/current psychotherapy: History of psychotherapy linkage, no current therapists  History of suicidal attempts/gestures: Numerous attempts in the past including overdose and \"cutting wrists\"  History of violence/aggressive behaviors: Denies  Psychotropic medication trials: Lexapro, Prozac, Zoloft, Paxil, Remeron, Trazodone, Wellbutrin, BuSpar, VPA, Lithium, Risperdal, Seroquel, Gabapentin, Propranolol, Clonidine, Valium, Xanax, Klonopin, Adderall, Ativan, Librium, " Provigil, Pristiq (too much GI discomfort)    Substance abuse inpatient/outpatient rehabilitation: 10+ rehab/detox admissions - first at the age of 17 in [facility] - last in 2017 at [facility]     Substance Abuse History: (unchanged information from previous note copied and italicized) - Information that is bolded has been updated      Extensive history of illict substance (cocaine, methamphetamine, cannabis, opiates, hallucinogen), and tobacco abuse  She denies a history of ETOH abuse  No past legal actions yet 1 prior arrest secondary to substance intoxication  The patient denies prior DWIs/DUIs  Extensive history of outpatient/inpatient rehabilitation programs  Shereen does not exhibit objective evidence of substance withdrawal during today's examination nor does Shereen appear under the influence of any psychoactive substance           Social History: (unchanged information from previous note copied and italicized) - Information that is bolded has been updated       Developmental: Denies a history of milestone/developmental delay  Denies a history of in-utero exposure to toxins/illicit substances  There is no documented history of IEP or need for special education    [address]  Marital history:   Living arrangement, social support: , has 2 children (daughter and son)  Occupational History: on permanent disability  Access to firearms: Denies direct access to weapons/firearms  Shereen Paris has no history of arrests or violence with a deadly weapon       Traumatic History: (unchanged information from previous note copied and italicized) - Information that is bolded has been updated       Abuse:none is reported  Other Traumatic Events: Father dying in 2017 was problematic       Past Medical History:    Past Medical History:   Diagnosis Date   • Addiction to drug [facility])    • Anxiety    • Arthritis    • Depression    • Endometriosis     no current issues   • Infectious viral hepatitis     history of hep c   • Irritable bowel syndrome    • Substance abuse (HCC)         Past Surgical History:   Procedure Laterality Date   • APPENDECTOMY     • CHOLECYSTECTOMY     • HEMORRHOID SURGERY       Allergies   Allergen Reactions   • Ciprofloxacin Anaphylaxis     Interacts with Zubsolv   • Codeine Lightheadedness     Reaction Date: 2011;    • Morphine Lightheadedness     Reaction Date: 2011;    • Sulfa Antibiotics Anaphylaxis and Rash   • Tramadol Lightheadedness       Substance Abuse History:    Social History     Substance and Sexual Activity   Alcohol Use Not Currently    Comment: rarely/holidays     Social History     Substance and Sexual Activity   Drug Use Not Currently   • Types: Heroin, Cocaine       Social History:    Social History     Socioeconomic History   • Marital status: /Civil Union     Spouse name: Monse Single   • Number of children: 2   • Years of education: Not on file   • Highest education level: Not on file   Occupational History   • Occupation: homemaker   Tobacco Use   • Smoking status: Former     Types: Cigarettes     Quit date: 3/14/2019     Years since quittin 2   • Smokeless tobacco: Never   Vaping Use   • Vaping Use: Never used   Substance and Sexual Activity   • Alcohol use: Not Currently     Comment: rarely/holidays   • Drug use: Not Currently     Types: Heroin, Cocaine   • Sexual activity: Yes     Partners: Male     Birth control/protection: Injection   Other Topics Concern   • Not on file   Social History Narrative   • Not on file     Social Determinants of Health     Financial Resource Strain: Low Risk  (2022)    Overall Financial Resource Strain (CARDIA)    • Difficulty of Paying Living Expenses: Not hard at all   Food Insecurity: No Food Insecurity (2022)    Hunger Vital Sign    • Worried About Running Out of Food in the Last Year: Never true    • Ran Out of Food in the Last Year: Never true   Transportation Needs: No Transportation Needs (11/22/2022)    PRAPARE - Transportation    • Lack of Transportation (Medical): No    • Lack of Transportation (Non-Medical): No   Physical Activity: Insufficiently Active (9/19/2019)    Exercise Vital Sign    • Days of Exercise per Week: 3 days    • Minutes of Exercise per Session: 30 min   Stress: No Stress Concern Present (3/22/2022)    [address]    • Feeling of Stress :  Only a little   Social Connections: Unknown (9/19/2019)    Social Connection and Isolation Panel [NHANES]    • Frequency of Communication with Friends and Family: Patient refused    • Frequency of Social Gatherings with Friends and Family: Patient refused    • Attends [employer] Services: Patient refused    • Active Member of Clubs or Organizations: Patient refused    • Attends Club or Organization Meetings: Patient refused    • Marital Status: Patient refused   Intimate Partner Violence: Not At Risk (9/19/2019)    Humiliation, Afraid, Rape, and Kick questionnaire    • Fear of Current or Ex-Partner: No    • Emotionally Abused: No    • Physically Abused: No    • Sexually Abused: No   Housing Stability: [address]  (3/22/2022)    Housing Stability Vital Sign    • Unable to Pay for Housing in the Last Year: No    • Number of [place] in the Last Year: 1    • Unstable Housing in the Last Year: No       Family Psychiatric History:     Family History   Problem Relation Age of Onset   • Cancer Father    • Drug abuse Father    • Drug abuse Mother    • No Known Problems Sister    • No Known Problems Daughter    • No Known Problems Maternal Grandmother    • Breast cancer Maternal Grandfather [de-identified]   • Prostate cancer Maternal Grandfather    • Melanoma Maternal Grandfather    • Colon cancer Maternal Grandfather 79   • No Known Problems Paternal Grandmother    • No Known Problems Paternal Grandfather    • No Known Problems Daughter    • No Known Problems Maternal Aunt "      History Review: The following portions of the patient's history were reviewed and updated as appropriate: allergies, current medications, past family history, past medical history, past social history, past surgical history and problem list          OBJECTIVE:     Vital signs in last 24 hours: There were no vitals filed for this visit  Mental Status Evaluation:    Appearance age appropriate, casually dressed, dressed appropriately, looks stated age, piercings, tattooed   Behavior pleasant, cooperative, mildly anxious, good eye contact   Speech normal rate, normal volume, normal pitch   Mood \"pretty good\"   Affect normal range and intensity, appropriate   Thought Processes organized, coherent, goal directed   Associations intact associations   Thought Content no overt delusions   Perceptual Disturbances: no auditory hallucinations, no visual hallucinations   Abnormal Thoughts  Risk Potential Suicidal ideation - None at present  Homicidal ideation - None at present  Potential for aggression - No   Orientation oriented to person, place, time/date and situation   Memory recent and remote memory grossly intact   Consciousness alert and awake   Attention Span Concentration Span attention span and concentration are age appropriate   Intellect appears to be of average intelligence   Insight intact and good   Judgement intact and good   Muscle Strength and  Gait normal gait and normal balance   Motor activity no abnormal movements   Language no difficulty naming common objects   Fund of Knowledge adequate knowledge of current events   Pain mild   Pain Scale Did not ask patient to formally rate       Laboratory Results: I have personally reviewed all pertinent laboratory/tests results    Recent Labs (last 2 months):   No visits with results within 2 Month(s) from this visit     Latest known visit with results is:   Office Visit on 03/22/2023   Component Date Value   • LEUKOCYTE ESTERASE,UA 03/22/2023 moderate    • " NITRITE,UA 03/22/2023 negative    • SL AMB POCT UROBILINOGEN 03/22/2023 0 2    • POCT URINE PROTEIN 03/22/2023 30    •  PH,UA 03/22/2023 6 0    • BLOOD,UA 03/22/2023 large    • SPECIFIC GRAVITY,UA 03/22/2023 1 030    • KETONES,UA 03/22/2023 5    • BILIRUBIN,UA 03/22/2023 negative    • GLUCOSE, UA 03/22/2023 negative    •  COLOR,UA 03/22/2023 jody    • CLARITY,UA 03/22/2023 turbid    • Urine Culture 03/22/2023 >100,000 cfu/ml Enterococcus faecalis (A)        Suicide/Homicide Risk Assessment:    The following interventions are recommended: contracts for safety at present - agrees to go to ED if feeling unsafe      Lethality Statement:    Based on today's assessment and clinical criteria, Michel Sheets contracts for safety and is not an imminent risk of harm to self or others  Outpatient level of care is deemed appropriate at this current time  Cielo Puga understands that if they can no longer contract for safety, they need to call the office or report to their nearest Emergency Room for immediate evaluation  Assessment/Plan:     Shereen Paris is a 48 y  o  female with past psychiatric history significant for generalized anxiety disorder with panic attacks, opiate dependence (Rx Zubsolv via [facility]), insomnia, and extensive history of polysubstance abuse (cannabis, hallucinogens, cocaine) who was personally seen and evaluated today at the [address] [place] [address] E outpatient clinic for follow-up and medication management  Shereen endorses a longstanding history of anxiety and episodic depression that served as the catalyst for her substance abuse  She states that her biological parents abused illicit substances and thus, they were emotionally neglectful and inconsistent  At the age of 16, Cielo Puga began using cannabis which ultimately lead to use of numerous illicit substances  Shereen reports placement in 10+ detox/rehabilitation programs throughout the course of her life   Her first rehabilitation program was "at age 17 in [facility]  She last attended [facility] in 2017, following the death of her father and and subsequent inpatient admission at [facility]  She endorses sobriety from opiates and other illicit substances for the last 3+ years  She was recently psychiatrically managed via CRNP Mrs Kee Hassan through [facility]  However, given their change in policy regarding concurrent use of opiates and benzodiazepines, Fanny Nina was transferred to [facility] for safer, more appropriate tapering process  Fanny Nina has been treated with benzodiazepines since her early 20's  She was previously on Xanax 6mg Daily (2mg TID) but was recently tapered to 4mg Daily (2mg AM, 1mg afternoon, 1mg QHS) over the last 8 months  PDMP nena reviewed, no concerns for abuse or misuse  I spoke at length with Fanny Nina about 's policy regarding concurrent use of opiates and benzodiazepines and risks/alternatives to treatment  She voiced understanding regarding the need to taper off Xanax and was agreeable to do so  I also discussed possibility of faster tapering process via detox program at [address] [place] [address], to which she was resistant given responsibility of caring for her family  Shereen denies most neurovegetative symptomatology suggestive of major depressive disorder  Shereen adamantly denies acute thoughts of suicide or self-harm  Shereen has no plans to harm others  There is a documented history of prior suicidal gestures/suicidal attempts  Shereen admits to overdosing and \"cutting her wrists\" in the past  She has not harmed herself or attempted to end her life in 4+ years  Shereen endorses a historical struggle with anxiety and symptomatology consistent with generalized anxiety disorder  Shereen reports anxiety that is pathologic in nature  Shereen endorses excessive nervousness, irrational worry, and overt anxiousness   Shereen is not pervasively restless or tense but does report feeling episodically keyed-up and on-edge  Shereen experiences disruption " in energy and concentration secondary to anxiety  There is evidence to suggest that Shereen experiences irritability, inability to relax, and disruption in sleep in the past, secondary to pathologic anxiety  Shereen denies new-onset panic symptomatology but does report a history of panic attacks characterized by tremor, SOB, fear of impending doom, and overt anxiousness  She denies maladaptive behaviors  Shereen vehemently denies any acute or chronic history suggestive of an underlying affective (bipolar) organization  Shereen denies historical symptomatology suggestive of an underlying psychotic process  Shereen denies historical symptomatology suggestive of PTSD, OCD, or disordered eating          Today's Plan:     Psychopharmacologically, Shereen reports tolerability and benefit from current use of Xanax  She understands the risks of concurrent use of BZ and opiates  Will continue slowly tapering her off Xanax but will delay this process at the current time given upcoming oral surgery in July as a means of not exacerbating anxiety and withdrawal prior to this procedure  Risks and side effects of chronic benzodiazepine use discussed with Ashlee Menon , including risk for falls, sedation, respiratory depression (especially if taken in higher doses than prescribed or if taken together with another sedating substance such as alcohol or opiate medications), as well as risk of addiction (especially if taken more often or at higher doses than prescribed) and withdrawal (if stops abruptly, especially if taken more often or at higher doses) including seizures and death  Arelia Babinski was instructed to not drive or operate heavy machinery while taking benzodiazepines, as the medication can cause increased drowsiness   Shereen verbalized understanding and would like to continue at current dose      DSM-V Diagnoses:      1 ) Generalized Anxiety Disorder with Panic Attacks   2 ) Opiate dependence, continuous  3 ) Polysubstance abuse by history   4 ) Insomnia        Treatment Recommendations/Precautions:     1 ) Generalized Anxiety Disorder with Panic Attacks   - Continue Xanax 2mg afternoon, 0 5mg QHS (total of 0 5mg reduction) - tapering process in effect (was previously on high doses)              - I inherited Shereen on high-dose Xanax - plan has been to ultimately taper off Xanax given concurrent use of Suboxone, however, Virgie Ponce has been on BZ for 20+ years so tapering process will be arduous      2 ) Opiate dependence, continuous  - [facility] 8 [facility]       3  ) Polysubstance abuse by history   - Currently sober  - Counseled to avoid ETOH, illict substances, and nicotine secondary to the detrimental effects of these substances on mental and physical health     4 ) Insomnia  -No Tx      Medication management every 4 months  Does not want to see a therapist despite recommendation  Aware of need to follow up with family physician for medical issues  Aware of 24 hour and weekend coverage for urgent situations accessed by calling [facility] main practice number    Medications Risks/Benefits      Risks, Benefits And Possible Side Effects Of Medications:    Risks, benefits, and possible side effects of medications explained to Virgie Ponce including risks of cardiovascular side effects including elevated blood pressure, risk of misuse, abuse or dependence and risk of increased anxiety related to treatment with stimulant medications  She verbalizes understanding and agreement for treatment  Controlled Medication Discussion:     Virgie Ponce has been filling controlled prescriptions on time as prescribed according to [facility]  Discussed with Virgie Ponce the risks of sedation, respiratory depression, impairment of ability to drive and potential for abuse and addiction related to treatment with benzodiazepine medications   She understands risk of treatment with benzodiazepine medications, agrees to not drive if feels impaired and agrees to take medications as prescribed  Discussed with [facility] warning on concurrent use of benzodiazepines and opioid medications including sedation, respiratory depression, coma and death  She understands the risk of treatment with benzodiazepines in addition to opioids and wants to continue taking those medications    Psychotherapy Provided:     Individual psychotherapy provided: No     Treatment Plan:    Completed and signed during the session: Yes - Treatment Plan done but not signed at time of office visit due to:  Plan reviewed in person and verbal consent given due to [place] social distancing      Visit Time    Visit Start Time: 10:55 AM   Visit Stop Time: 11:20 AM  Total Visit Duration: 25 minutes     The total visit duration detailed above includes: patient engagement, medication management, psychotherapy/counseling, documentation, discussion regarding treatment goals, and coordination of care  Note Share Disclaimer:      This note was not shared with the patient due to reasonable likelihood of causing patient harm      Kenyon Iqbal MD  Board Certified Diplomate of the American Board of Psychiatry and Neurology  06/07/23

## 2023-06-14 ENCOUNTER — OFFICE VISIT (OUTPATIENT)
Dept: URGENT CARE | Age: 48
End: 2023-06-14
Payer: MEDICARE

## 2023-06-14 VITALS
TEMPERATURE: 96.9 F | RESPIRATION RATE: 18 BRPM | OXYGEN SATURATION: 98 % | SYSTOLIC BLOOD PRESSURE: 137 MMHG | DIASTOLIC BLOOD PRESSURE: 85 MMHG | HEART RATE: 96 BPM

## 2023-06-14 DIAGNOSIS — R30.0 BURNING WITH URINATION: Primary | ICD-10-CM

## 2023-06-14 LAB
SL AMB  POCT GLUCOSE, UA: NEGATIVE
SL AMB LEUKOCYTE ESTERASE,UA: ABNORMAL
SL AMB POCT BILIRUBIN,UA: NEGATIVE
SL AMB POCT BLOOD,UA: ABNORMAL
SL AMB POCT CLARITY,UA: CLEAR
SL AMB POCT COLOR,UA: YELLOW
SL AMB POCT KETONES,UA: NEGATIVE
SL AMB POCT NITRITE,UA: NEGATIVE
SL AMB POCT PH,UA: 6
SL AMB POCT SPECIFIC GRAVITY,UA: 1.03
SL AMB POCT URINE PROTEIN: 100
SL AMB POCT UROBILINOGEN: 0.2

## 2023-06-14 PROCEDURE — 87086 URINE CULTURE/COLONY COUNT: CPT

## 2023-06-14 PROCEDURE — 99213 OFFICE O/P EST LOW 20 MIN: CPT

## 2023-06-14 PROCEDURE — 81002 URINALYSIS NONAUTO W/O SCOPE: CPT

## 2023-06-14 PROCEDURE — 87147 CULTURE TYPE IMMUNOLOGIC: CPT

## 2023-06-14 RX ORDER — PHENAZOPYRIDINE HYDROCHLORIDE 200 MG/1
200 TABLET, FILM COATED ORAL
Qty: 6 TABLET | Refills: 0 | Status: SHIPPED | OUTPATIENT
Start: 2023-06-14 | End: 2023-06-16

## 2023-06-14 RX ORDER — NITROFURANTOIN 25; 75 MG/1; MG/1
100 CAPSULE ORAL 2 TIMES DAILY
Qty: 10 CAPSULE | Refills: 0 | Status: SHIPPED | OUTPATIENT
Start: 2023-06-14 | End: 2023-06-19

## 2023-06-14 NOTE — PROGRESS NOTES
[facility]        NAME: Lester Black is a 53 y o  female  : 1975    MRN: [id number]  DATE: 2023  TIME: 2:53 PM    Assessment and Plan   Burning with urination [R30 0]  1  Burning with urination  POCT urine dip    Urine culture    nitrofurantoin (MACROBID) 100 mg capsule    phenazopyridine (PYRIDIUM) 200 mg tablet            Patient Instructions     UA with mod leuks, lg blood  Start Loletha Fails  F/u on UXC  Pyridium as directed  Follow up with PCP in 2-3 days  Proceed to ER if symptoms worsen  Chief Complaint     Chief Complaint   Patient presents with   • Possible UTI     Burning with urination, pt noted blood in urine  Sx for two days  Nothing OTC  History of Present Illness     53 y o  F  Burning with urination & hematuria x 2 days  Denies back pain, fevers or chills  Denies hx of nephrolithiasis  Hx of enterococcus faecalis & e coli in UXC  Recent GI bug per pt  No OTC meds    Review of Systems   Review of Systems   Constitutional: Negative for chills, fatigue and fever  HENT: Negative for congestion, ear pain, facial swelling, hearing loss, rhinorrhea, sinus pressure, sneezing, sore throat and trouble swallowing  Eyes: Negative for pain, redness and visual disturbance  Respiratory: Negative for cough, chest tightness, shortness of breath and wheezing  Cardiovascular: Negative for chest pain and palpitations  Gastrointestinal: Negative for abdominal pain, diarrhea, nausea and vomiting  Genitourinary: Positive for dysuria and hematuria  Negative for flank pain and pelvic pain  Musculoskeletal: Negative for arthralgias, back pain and myalgias  Skin: Negative for color change and rash  Neurological: Negative for dizziness, seizures, syncope, weakness, light-headedness and headaches  Psychiatric/Behavioral: Negative for confusion, hallucinations and sleep disturbance  The patient is not nervous/anxious      All other systems reviewed and are negative  Current Medications       Current Outpatient Medications:   •  ALPRAZolam (XANAX) 1 mg tablet, Take 2mg each afternoon and 0 5mg each evening   Do not start before June 6, 2023 , Disp: 75 tablet, Rfl: 0  •  Buprenorphine HCl-Naloxone HCl 8 6-2 1 MG SUBL, , Disp: , Rfl:   •  medroxyPROGESTERone (DEPO-PROVERA) 150 mg/mL injection, Inject 1 mL (150 mg total) into a muscle every 3 (three) months, Disp: 1 mL, Rfl: 3  •  nitrofurantoin (MACROBID) 100 mg capsule, Take 1 capsule (100 mg total) by mouth 2 (two) times a day for 5 days, Disp: 10 capsule, Rfl: 0  •  phenazopyridine (PYRIDIUM) 200 mg tablet, Take 1 tablet (200 mg total) by mouth 3 (three) times a day with meals for 2 days, Disp: 6 tablet, Rfl: 0  •  polyethylene glycol (GLYCOLAX) 17 GM/SCOOP powder, , Disp: , Rfl:     Current Facility-Administered Medications:   •  medroxyPROGESTERone acetate (DEPO-PROVERA SYRINGE) IM injection 150 mg, 150 mg, Intramuscular, Q3 Months, Anahi Vail MD, 150 mg at 04/26/23 1101    Current Allergies     Allergies as of 06/14/2023 - Reviewed 06/14/2023   Allergen Reaction Noted   • Ciprofloxacin Anaphylaxis 01/07/2019   • Codeine Lightheadedness 08/28/2001   • Morphine Lightheadedness 08/28/2001   • Sulfa antibiotics Anaphylaxis and Rash 09/28/2017   • Tramadol Lightheadedness 09/28/2017   • Bactrim [sulfamethoxazole-trimethoprim] Hives 06/14/2023            The following portions of the patient's history were reviewed and updated as appropriate: allergies, current medications, past family history, past medical history, past social history, past surgical history and problem list      Past Medical History:   Diagnosis Date   • Addiction to drug [facility])    • Anxiety    • Arthritis    • Depression    • Endometriosis     no current issues   • Infectious viral hepatitis     history of hep c   • Irritable bowel syndrome    • Substance abuse [facility])        Past Surgical History:   Procedure Laterality Date   • APPENDECTOMY     • CHOLECYSTECTOMY     • HEMORRHOID SURGERY         Family History   Problem Relation Age of Onset   • Cancer Father    • Drug abuse Father    • Drug abuse Mother    • No Known Problems Sister    • No Known Problems Daughter    • No Known Problems Maternal Grandmother    • Breast cancer Maternal Grandfather [de-identified]   • Prostate cancer Maternal Grandfather    • Melanoma Maternal Grandfather    • Colon cancer Maternal Grandfather 79   • No Known Problems Paternal Grandmother    • No Known Problems Paternal Grandfather    • No Known Problems Daughter    • No Known Problems Maternal Aunt          Medications have been verified  Objective   /85   Pulse 96   Temp (!) 96 9 °F (36 1 °C) (Tympanic)   Resp 18   SpO2 98%   No LMP recorded  (Menstrual status: Birth Control)  Physical Exam     Physical Exam  Vitals reviewed  Constitutional:       General: She is not in acute distress  Appearance: Normal appearance  She is not toxic-appearing  HENT:      Head: Normocephalic  Right Ear: Tympanic membrane normal  Tympanic membrane is not erythematous or bulging  Left Ear: Tympanic membrane normal  Tympanic membrane is not erythematous or bulging  Nose: No congestion or rhinorrhea  Right Sinus: No maxillary sinus tenderness or frontal sinus tenderness  Left Sinus: No maxillary sinus tenderness or frontal sinus tenderness  Mouth/Throat:      Pharynx: Uvula midline  No oropharyngeal exudate, posterior oropharyngeal erythema or uvula swelling  Tonsils: No tonsillar exudate or tonsillar abscesses  Eyes:      Extraocular Movements: Extraocular movements intact  Conjunctiva/sclera: Conjunctivae normal       Pupils: Pupils are equal, round, and reactive to light  Cardiovascular:      Rate and Rhythm: Normal rate and regular rhythm  Pulses: Normal pulses  Heart sounds: Normal heart sounds     Pulmonary:      Effort: Pulmonary effort is normal  No tachypnea or respiratory distress  Breath sounds: Normal breath sounds and air entry  No decreased breath sounds, wheezing, rhonchi or rales  Abdominal:      General: Bowel sounds are normal       Palpations: Abdomen is soft  Tenderness: There is no abdominal tenderness  There is no right CVA tenderness, left CVA tenderness or guarding  Musculoskeletal:         General: Normal range of motion  Cervical back: Normal range of motion  Lymphadenopathy:      Cervical: No cervical adenopathy  Skin:     General: Skin is warm and dry  Neurological:      General: No focal deficit present  Mental Status: She is alert  Sensory: Sensation is intact  Motor: Motor function is intact  Coordination: Coordination is intact  Gait: Gait is intact  Deep Tendon Reflexes: Reflexes are normal and symmetric

## 2023-06-15 ENCOUNTER — TELEPHONE (OUTPATIENT)
Dept: URGENT CARE | Age: 48
End: 2023-06-15

## 2023-06-15 DIAGNOSIS — N39.0 URINARY TRACT INFECTION WITHOUT HEMATURIA, SITE UNSPECIFIED: Primary | ICD-10-CM

## 2023-06-15 LAB
BACTERIA UR CULT: ABNORMAL
BACTERIA UR CULT: ABNORMAL

## 2023-06-15 RX ORDER — CEPHALEXIN 500 MG/1
500 CAPSULE ORAL EVERY 12 HOURS SCHEDULED
Qty: 14 CAPSULE | Refills: 0 | Status: SHIPPED | OUTPATIENT
Start: 2023-06-15 | End: 2023-06-22

## 2023-06-15 NOTE — TELEPHONE ENCOUNTER
Patient called back, discussed with patient her findings of the urine culture, to stop the nitrofuratoin medications and start keflex  Pt verbalized understanding  Medication changed     Demaris Pro

## 2023-06-15 NOTE — TELEPHONE ENCOUNTER
Called to notify patient of urine culture results and findings, Antibiotic needs to be changed to keflex 500mg BID for the next 7 days and told to stop taking nitrofurantoin as RX prior  A/w for call back to see where to send medication     Benny Aragon

## 2023-06-29 ENCOUNTER — TELEPHONE (OUTPATIENT)
Dept: PSYCHIATRY | Facility: CLINIC | Age: 48
End: 2023-06-29

## 2023-06-29 NOTE — TELEPHONE ENCOUNTER
Dr Isabel Frost called and left ms for Dr Bárbara Dudley concerning mutual patient, Dr Isabel Frost would like to speak with provider regarding patient and can be reached at [phone]

## 2023-06-29 NOTE — TELEPHONE ENCOUNTER
Patient called stating she did do the XRAYS, she has a slit disk, she tried calling orthopedics today to make an appt because they have not reached out to her yet, but she was told Alexandrea Dhillon MD is out until October. She has not started PT. Patient still would like something stronger for her pain, she feels she is suffering and does not want to feel like this until October. Spoke to Dr Mounika Bal directly  We discussed Shereen's treatment course and progress  No acute concerns regarding use of Zubsov at the moment

## 2023-07-05 DIAGNOSIS — F41.0 GENERALIZED ANXIETY DISORDER WITH PANIC ATTACKS: ICD-10-CM

## 2023-07-05 DIAGNOSIS — F41.1 GENERALIZED ANXIETY DISORDER WITH PANIC ATTACKS: ICD-10-CM

## 2023-07-05 RX ORDER — ALPRAZOLAM 1 MG/1
TABLET ORAL
Qty: 75 TABLET | Refills: 0 | Status: SHIPPED | OUTPATIENT
Start: 2023-07-05

## 2023-07-05 NOTE — TELEPHONE ENCOUNTER
Medication Refill Request     Name of Medication Xanax  Dose/Frequency 1mg take 2mg each afternoon and 0.5mg each evening  Quantity 75  Verified pharmacy   [x]  Verified ordering Provider   [x]  Does patient have enough for the next 3 days? Yes [] No [x]  Does patient have a follow-up appointment scheduled?  Yes [x] No []   If so when is appointment: 10/16/2023 3:30pm

## 2023-07-20 ENCOUNTER — CLINICAL SUPPORT (OUTPATIENT)
Dept: OBGYN CLINIC | Facility: CLINIC | Age: 48
End: 2023-07-20

## 2023-07-20 VITALS
DIASTOLIC BLOOD PRESSURE: 82 MMHG | HEIGHT: 65 IN | SYSTOLIC BLOOD PRESSURE: 129 MMHG | HEART RATE: 92 BPM | WEIGHT: 190.2 LBS | BODY MASS INDEX: 31.69 KG/M2

## 2023-07-20 DIAGNOSIS — Z30.42 ENCOUNTER FOR SURVEILLANCE OF INJECTABLE CONTRACEPTIVE: ICD-10-CM

## 2023-07-20 PROCEDURE — 96372 THER/PROPH/DIAG INJ SC/IM: CPT

## 2023-07-20 RX ADMIN — MEDROXYPROGESTERONE ACETATE 150 MG: 150 INJECTION, SUSPENSION INTRAMUSCULAR at 10:53

## 2023-07-20 NOTE — PROGRESS NOTES
Pt here for depo injection. Given in right buttocks.     [address] - [phone]  LOT - [id number]  EXP - 12/31/2026

## 2023-08-02 DIAGNOSIS — F41.1 GENERALIZED ANXIETY DISORDER WITH PANIC ATTACKS: ICD-10-CM

## 2023-08-02 DIAGNOSIS — F41.0 GENERALIZED ANXIETY DISORDER WITH PANIC ATTACKS: ICD-10-CM

## 2023-08-02 RX ORDER — ALPRAZOLAM 1 MG/1
TABLET ORAL
Qty: 75 TABLET | Refills: 0 | Status: SHIPPED | OUTPATIENT
Start: 2023-08-02

## 2023-08-02 NOTE — TELEPHONE ENCOUNTER
Medication Refill Request     Name of Medication Xanax  Dose/Frequency 1mg 2mg each afternoon and 0.5mg each evening  Quantity 75 tablets  Verified pharmacy   [x]  Verified ordering Provider   [x]  Does patient have enough for the next 3 days? Yes [] No [x]  Does patient have a follow-up appointment scheduled?  Yes [x] No []   If so when is appointment: 10/16r

## 2023-08-02 NOTE — TELEPHONE ENCOUNTER
PDMP website reviewed. Dwan Leyden has been appropriately adherent to controlled psychotropic medications without evidence of abuse or misuse. As such, will send 30-day refill to pharmacy of choice and follow up as necessary.

## 2023-08-30 DIAGNOSIS — F41.0 GENERALIZED ANXIETY DISORDER WITH PANIC ATTACKS: ICD-10-CM

## 2023-08-30 DIAGNOSIS — F41.1 GENERALIZED ANXIETY DISORDER WITH PANIC ATTACKS: ICD-10-CM

## 2023-08-30 RX ORDER — ALPRAZOLAM 1 MG/1
TABLET ORAL
Qty: 75 TABLET | Refills: 0 | Status: SHIPPED | OUTPATIENT
Start: 2023-08-30

## 2023-08-30 NOTE — TELEPHONE ENCOUNTER
Medication Refill Request     Name of Medication Alprazolam  Dose/Frequency 1  Mg/ Take 2 mg each afternoon and 0.5 mg every evening. Quantity 75  Verified pharmacy   [x]  Verified ordering Provider   [x]  Does patient have enough for the next 3 days? Yes [] No [x]  Does patient have a follow-up appointment scheduled?  Yes [x] No []   If so when is appointment: 10/16/2023

## 2023-09-27 DIAGNOSIS — F41.0 GENERALIZED ANXIETY DISORDER WITH PANIC ATTACKS: ICD-10-CM

## 2023-09-27 DIAGNOSIS — F41.1 GENERALIZED ANXIETY DISORDER WITH PANIC ATTACKS: ICD-10-CM

## 2023-09-27 RX ORDER — ALPRAZOLAM 1 MG/1
TABLET ORAL
Qty: 75 TABLET | Refills: 0 | Status: SHIPPED | OUTPATIENT
Start: 2023-09-27

## 2023-09-27 NOTE — TELEPHONE ENCOUNTER
PDMP website reviewed. Siomara Keller has been appropriately adherent to controlled psychotropic medications without evidence of abuse or misuse. As such, will send 30-day refill to pharmacy of choice and follow up as necessary.

## 2023-09-27 NOTE — TELEPHONE ENCOUNTER
Medication Refill Request     Name of Medication Xanax  Dose/Frequency 1mg, 2mg every afternoon, 0.5mg every evening  Quantity 75 tablets  Verified pharmacy   [x]  Verified ordering Provider   []  Does patient have enough for the next 3 days? Yes [] No [x]  Does patient have a follow-up appointment scheduled?  Yes [x] No []   If so when is appointment: 10/16 @3:30

## 2023-09-28 ENCOUNTER — ANESTHESIA EVENT (OUTPATIENT)
Dept: PERIOP | Facility: HOSPITAL | Age: 48
End: 2023-09-28
Payer: MEDICARE

## 2023-09-28 ENCOUNTER — TELEPHONE (OUTPATIENT)
Dept: PSYCHIATRY | Facility: CLINIC | Age: 48
End: 2023-09-28

## 2023-09-28 RX ORDER — DIPHENHYDRAMINE HCL 25 MG
25 CAPSULE ORAL EVERY 6 HOURS PRN
COMMUNITY

## 2023-09-28 NOTE — TELEPHONE ENCOUNTER
Received notification on [facility] PA was approved.          Nursing to call pharmacy and patient

## 2023-09-28 NOTE — TELEPHONE ENCOUNTER
Patient was calling about prior auth for her medication, writer transferred caller to nursing line for assistance

## 2023-09-28 NOTE — PRE-PROCEDURE INSTRUCTIONS
Pre-Surgery Instructions:   Medication Instructions   • ALPRAZolam (XANAX) 1 mg tablet Take as directed   • Buprenorphine HCl-Naloxone HCl 8.6-2.1 MG SUBL Take as directed   • diphenhydrAMINE (BENADRYL) 25 mg capsule Take night before surgery      Medication instructions for day surgery reviewed. Please use only a sip of water to take your instructed medications. Avoid all over the counter vitamins, supplements and NSAIDS for one week prior to surgery per anesthesia guidelines. Tylenol is ok to take as needed. You will receive a call one business day prior to surgery with an arrival time and hospital directions. If your surgery is scheduled on a Monday, the hospital will be calling you on the Friday prior to your surgery. If you have not heard from anyone by 8pm, please call the hospital supervisor through the hospital  at [phone]. Justo Canales [phone]). Do not eat or drink anything after midnight the night before your surgery, including candy, mints, lifesavers, or chewing gum. Do not drink alcohol 24hrs before your surgery. Try not to smoke at least 24hrs before your surgery. Follow the pre surgery showering instructions as listed in the [facility] Surgical Experience Booklet” or otherwise provided by your surgeon's office. Do not shave the surgical area 24 hours before surgery. Do not apply any lotions, creams, including makeup, cologne, deodorant, or perfumes after showering on the day of your surgery. No contact lenses, eye make-up, or artificial eyelashes. Remove nail polish, including gel polish, and any artificial, gel, or acrylic nails if possible. Remove all jewelry including rings and body piercing jewelry. Wear causal clothing that is easy to take on and off. Consider your type of surgery. Keep any valuables, jewelry, piercings at home. Please bring any specially ordered equipment (sling, braces) if indicated.     Arrange for a responsible person to drive you to and from the hospital on the day of your surgery. Visitor Guidelines discussed. Call the surgeon's office with any new illnesses, exposures, or additional questions prior to surgery. Please reference your [facility] Surgical Experience Booklet” for additional information to prepare for your upcoming surgery.

## 2023-09-29 NOTE — TELEPHONE ENCOUNTER
Spoke with Tawanda Dunbar at [facility].  Advised of Alprazolam PA approval.       Spoke with Adriana Walker and made aware

## 2023-10-02 ENCOUNTER — APPOINTMENT (OUTPATIENT)
Dept: LAB | Age: 48
End: 2023-10-02
Payer: MEDICARE

## 2023-10-02 ENCOUNTER — OFFICE VISIT (OUTPATIENT)
Dept: FAMILY MEDICINE CLINIC | Facility: CLINIC | Age: 48
End: 2023-10-02
Payer: MEDICARE

## 2023-10-02 ENCOUNTER — APPOINTMENT (OUTPATIENT)
Dept: RADIOLOGY | Age: 48
End: 2023-10-02
Payer: MEDICARE

## 2023-10-02 VITALS
OXYGEN SATURATION: 97 % | HEART RATE: 91 BPM | RESPIRATION RATE: 14 BRPM | BODY MASS INDEX: 31.82 KG/M2 | DIASTOLIC BLOOD PRESSURE: 82 MMHG | SYSTOLIC BLOOD PRESSURE: 126 MMHG | TEMPERATURE: 97.8 F | WEIGHT: 191 LBS | HEIGHT: 65 IN

## 2023-10-02 DIAGNOSIS — Z01.818 PRE-OP EXAM: ICD-10-CM

## 2023-10-02 DIAGNOSIS — Z00.01 ENCOUNTER FOR GENERAL ADULT MEDICAL EXAMINATION WITH ABNORMAL FINDINGS: Primary | ICD-10-CM

## 2023-10-02 DIAGNOSIS — Z12.31 ENCOUNTER FOR SCREENING MAMMOGRAM FOR BREAST CANCER: ICD-10-CM

## 2023-10-02 DIAGNOSIS — Z12.4 SCREENING FOR CERVICAL CANCER: ICD-10-CM

## 2023-10-02 DIAGNOSIS — Z00.01 ENCOUNTER FOR GENERAL ADULT MEDICAL EXAMINATION WITH ABNORMAL FINDINGS: ICD-10-CM

## 2023-10-02 DIAGNOSIS — Z12.11 SCREEN FOR COLON CANCER: ICD-10-CM

## 2023-10-02 PROBLEM — F11.20 OPIATE DEPENDENCE, CONTINUOUS (HCC): Status: RESOLVED | Noted: 2021-06-30 | Resolved: 2023-10-02

## 2023-10-02 LAB
25(OH)D3 SERPL-MCNC: 37.5 NG/ML (ref 30–100)
ALBUMIN SERPL BCP-MCNC: 4.3 G/DL (ref 3.5–5)
ALP SERPL-CCNC: 53 U/L (ref 34–104)
ALT SERPL W P-5'-P-CCNC: 19 U/L (ref 7–52)
ANA SER QL IA: NEGATIVE
ANION GAP SERPL CALCULATED.3IONS-SCNC: 6 MMOL/L
APTT PPP: 27 SECONDS (ref 23–37)
AST SERPL W P-5'-P-CCNC: 21 U/L (ref 13–39)
BACTERIA UR QL AUTO: ABNORMAL /HPF
BASOPHILS # BLD AUTO: 0.03 THOUSANDS/ÂΜL (ref 0–0.1)
BASOPHILS NFR BLD AUTO: 1 % (ref 0–1)
BILIRUB SERPL-MCNC: 0.3 MG/DL (ref 0.2–1)
BILIRUB UR QL STRIP: NEGATIVE
BUN SERPL-MCNC: 14 MG/DL (ref 5–25)
CALCIUM SERPL-MCNC: 9 MG/DL (ref 8.4–10.2)
CAOX CRY URNS QL MICRO: ABNORMAL /HPF
CHLORIDE SERPL-SCNC: 105 MMOL/L (ref 96–108)
CHOLEST SERPL-MCNC: 190 MG/DL
CLARITY UR: ABNORMAL
CO2 SERPL-SCNC: 29 MMOL/L (ref 21–32)
COLOR UR: YELLOW
CREAT SERPL-MCNC: 0.73 MG/DL (ref 0.6–1.3)
CRP SERPL QL: 1.4 MG/L
EOSINOPHIL # BLD AUTO: 0.14 THOUSAND/ÂΜL (ref 0–0.61)
EOSINOPHIL NFR BLD AUTO: 3 % (ref 0–6)
ERYTHROCYTE [DISTWIDTH] IN BLOOD BY AUTOMATED COUNT: 12.1 % (ref 11.6–15.1)
ERYTHROCYTE [SEDIMENTATION RATE] IN BLOOD: 34 MM/HOUR (ref 0–19)
FERRITIN SERPL-MCNC: 37 NG/ML (ref 11–307)
GFR SERPL CREATININE-BSD FRML MDRD: 98 ML/MIN/1.73SQ M
GLUCOSE P FAST SERPL-MCNC: 81 MG/DL (ref 65–99)
GLUCOSE UR STRIP-MCNC: NEGATIVE MG/DL
HCT VFR BLD AUTO: 39.7 % (ref 34.8–46.1)
HDLC SERPL-MCNC: 61 MG/DL
HGB BLD-MCNC: 12.7 G/DL (ref 11.5–15.4)
HGB UR QL STRIP.AUTO: NEGATIVE
IMM GRANULOCYTES # BLD AUTO: 0.01 THOUSAND/UL (ref 0–0.2)
IMM GRANULOCYTES NFR BLD AUTO: 0 % (ref 0–2)
INR PPP: 0.96 (ref 0.84–1.19)
KETONES UR STRIP-MCNC: NEGATIVE MG/DL
LDLC SERPL CALC-MCNC: 118 MG/DL (ref 0–100)
LEUKOCYTE ESTERASE UR QL STRIP: ABNORMAL
LYMPHOCYTES # BLD AUTO: 1.92 THOUSANDS/ÂΜL (ref 0.6–4.47)
LYMPHOCYTES NFR BLD AUTO: 37 % (ref 14–44)
MAGNESIUM SERPL-MCNC: 2.1 MG/DL (ref 1.9–2.7)
MCH RBC QN AUTO: 27.8 PG (ref 26.8–34.3)
MCHC RBC AUTO-ENTMCNC: 32 G/DL (ref 31.4–37.4)
MCV RBC AUTO: 87 FL (ref 82–98)
MONOCYTES # BLD AUTO: 0.29 THOUSAND/ÂΜL (ref 0.17–1.22)
MONOCYTES NFR BLD AUTO: 6 % (ref 4–12)
MUCOUS THREADS UR QL AUTO: ABNORMAL
NEUTROPHILS # BLD AUTO: 2.79 THOUSANDS/ÂΜL (ref 1.85–7.62)
NEUTS SEG NFR BLD AUTO: 53 % (ref 43–75)
NITRITE UR QL STRIP: NEGATIVE
NON-SQ EPI CELLS URNS QL MICRO: ABNORMAL /HPF
NONHDLC SERPL-MCNC: 129 MG/DL
NRBC BLD AUTO-RTO: 0 /100 WBCS
PH UR STRIP.AUTO: 6 [PH]
PLATELET # BLD AUTO: 247 THOUSANDS/UL (ref 149–390)
PMV BLD AUTO: 10.9 FL (ref 8.9–12.7)
POTASSIUM SERPL-SCNC: 3.7 MMOL/L (ref 3.5–5.3)
PROT SERPL-MCNC: 6.8 G/DL (ref 6.4–8.4)
PROT UR STRIP-MCNC: ABNORMAL MG/DL
PROTHROMBIN TIME: 12.7 SECONDS (ref 11.6–14.5)
RBC # BLD AUTO: 4.57 MILLION/UL (ref 3.81–5.12)
RBC #/AREA URNS AUTO: ABNORMAL /HPF
SODIUM SERPL-SCNC: 140 MMOL/L (ref 135–147)
SP GR UR STRIP.AUTO: 1.02 (ref 1–1.03)
T4 FREE SERPL-MCNC: 0.8 NG/DL (ref 0.61–1.12)
TRIGL SERPL-MCNC: 54 MG/DL
TSH SERPL DL<=0.05 MIU/L-ACNC: 2.19 UIU/ML (ref 0.45–4.5)
UROBILINOGEN UR STRIP-ACNC: <2 MG/DL
VIT B12 SERPL-MCNC: 409 PG/ML (ref 180–914)
WBC # BLD AUTO: 5.18 THOUSAND/UL (ref 4.31–10.16)
WBC #/AREA URNS AUTO: ABNORMAL /HPF

## 2023-10-02 PROCEDURE — 83735 ASSAY OF MAGNESIUM: CPT

## 2023-10-02 PROCEDURE — 86235 NUCLEAR ANTIGEN ANTIBODY: CPT

## 2023-10-02 PROCEDURE — 82728 ASSAY OF FERRITIN: CPT

## 2023-10-02 PROCEDURE — 86038 ANTINUCLEAR ANTIBODIES: CPT

## 2023-10-02 PROCEDURE — 85610 PROTHROMBIN TIME: CPT

## 2023-10-02 PROCEDURE — 82306 VITAMIN D 25 HYDROXY: CPT

## 2023-10-02 PROCEDURE — 84439 ASSAY OF FREE THYROXINE: CPT

## 2023-10-02 PROCEDURE — 99214 OFFICE O/P EST MOD 30 MIN: CPT | Performed by: INTERNAL MEDICINE

## 2023-10-02 PROCEDURE — 36415 COLL VENOUS BLD VENIPUNCTURE: CPT

## 2023-10-02 PROCEDURE — 86225 DNA ANTIBODY NATIVE: CPT

## 2023-10-02 PROCEDURE — 86430 RHEUMATOID FACTOR TEST QUAL: CPT

## 2023-10-02 PROCEDURE — 99396 PREV VISIT EST AGE 40-64: CPT | Performed by: INTERNAL MEDICINE

## 2023-10-02 PROCEDURE — 85025 COMPLETE CBC W/AUTO DIFF WBC: CPT

## 2023-10-02 PROCEDURE — 86140 C-REACTIVE PROTEIN: CPT

## 2023-10-02 PROCEDURE — 85730 THROMBOPLASTIN TIME PARTIAL: CPT

## 2023-10-02 PROCEDURE — 85652 RBC SED RATE AUTOMATED: CPT

## 2023-10-02 PROCEDURE — 84443 ASSAY THYROID STIM HORMONE: CPT

## 2023-10-02 PROCEDURE — 80061 LIPID PANEL: CPT

## 2023-10-02 PROCEDURE — 81001 URINALYSIS AUTO W/SCOPE: CPT

## 2023-10-02 PROCEDURE — 82607 VITAMIN B-12: CPT

## 2023-10-02 PROCEDURE — 71046 X-RAY EXAM CHEST 2 VIEWS: CPT

## 2023-10-02 PROCEDURE — 80053 COMPREHEN METABOLIC PANEL: CPT

## 2023-10-02 NOTE — H&P (VIEW-ONLY)
----- Message from Kyung Billings MD sent at 1/13/2017  7:25 PM CST -----  Please have come for a weight check week of jan 30th - ok to come in evening if needed since mom just back at work - just let me know what day    Mom aware you will call    BMI Counseling: Body mass index is 31.78 kg/m². The BMI is above normal. Nutrition recommendations include reducing portion sizes.

## 2023-10-02 NOTE — PROGRESS NOTES
Name: Colt Looney      : 1975      MRN: [id number]  Encounter Provider: Leticia Lamar MD  Encounter Date: 10/2/2023   Encounter department: [address] [place] [address]     1. Encounter for general adult medical examination with abnormal findings  Comments:  done in Office, Life style Mod,. RTC in 3 mos w  Bloodw ork  Orders:  -     RF Screen w/ Reflex to Titer; Future  -     BLANKA Screen w/ Reflex to Titer/Pattern; Future  -     Sedimentation rate, automated; Future  -     Ferritin; Future  -     C-reactive protein; Future  -     Anti-DNA antibody, double-stranded; Future  -     Sjogren's Antibodies; Future  -     Comprehensive metabolic panel; Future  -     CBC and differential; Future  -     Magnesium; Future  -     Lipid panel; Future  -     TSH, 3rd generation; Future; Expected date: 10/02/2023  -     T4, free; Future; Expected date: 10/02/2023  -     Vitamin B12; Future  -     Vitamin D 25 hydroxy; Future; Expected date: 10/02/2023    2. Screening for cervical cancer  -     Ambulatory referral to Obstetrics / Gynecology; Future    3. Encounter for screening mammogram for breast cancer  -     Mammo screening bilateral w 3d & cad; Future; Expected date: 10/02/2023    4. Pre-op exam  Comments:  Pt for Oral surgery is going under general anesth. she needs; CXR,ECG,and Blood work, for Pre Op Clearance. .. Orders:  -     UA (URINE) with reflex to Scope; Future; Expected date: 10/09/2023  -     RF Screen w/ Reflex to Titer; Future  -     BLANKA Screen w/ Reflex to Titer/Pattern; Future  -     Sedimentation rate, automated; Future  -     Ferritin; Future  -     C-reactive protein; Future  -     Anti-DNA antibody, double-stranded; Future  -     Sjogren's Antibodies; Future  -     Comprehensive metabolic panel; Future  -     CBC and differential; Future  -     Magnesium; Future  -     Lipid panel; Future  -     TSH, 3rd generation;  Future; Expected date: 10/02/2023  - T4, free; Future; Expected date: 10/02/2023  -     Vitamin B12; Future  -     Vitamin D 25 hydroxy; Future; Expected date: 10/02/2023  -     Protime-INR; Future  -     APTT; Future  -     XR chest pa & lateral; Future; Expected date: 10/02/2023    5. Screen for colon cancer  -     Peri    Clinically Pt is stable at this time for Oral surgery, But she needs ECG,CXR,and Blood work  RTC in 3 mos w Blood work       Subjective      [address] is here for Annual Physical exam and regular check up, and Pre Op Clearance for Oral surgery, she feels OK, No recent blood work, med list reviewed w  Pt. .. Review of Systems   Constitutional: Negative for chills, fatigue and fever. HENT: Positive for dental problem. Negative for congestion, facial swelling, sore throat, trouble swallowing and voice change. Eyes: Negative for pain, discharge and visual disturbance. Respiratory: Negative for cough, shortness of breath and wheezing. Cardiovascular: Negative for chest pain, palpitations and leg swelling. Gastrointestinal: Negative for abdominal pain, blood in stool, constipation, diarrhea and nausea. Endocrine: Negative for polydipsia, polyphagia and polyuria. Genitourinary: Negative for difficulty urinating, hematuria and urgency. Musculoskeletal: Negative for arthralgias and myalgias. Skin: Negative for rash. Neurological: Negative for dizziness, tremors, weakness and headaches. Hematological: Negative for adenopathy. Does not bruise/bleed easily. Psychiatric/Behavioral: Negative for dysphoric mood, sleep disturbance and suicidal ideas. Current Outpatient Medications on File Prior to Visit   Medication Sig   • ALPRAZolam (XANAX) 1 mg tablet Take 2mg each afternoon and 0.5mg each evening.    • Buprenorphine HCl-Naloxone HCl 8.6-2.1 MG SUBL    • diphenhydrAMINE (BENADRYL) 25 mg capsule Take 25 mg by mouth every 6 (six) hours as needed for itching   • medroxyPROGESTERone (DEPO-PROVERA) 150 mg/mL injection Inject 1 mL (150 mg total) into a muscle every 3 (three) months   • [DISCONTINUED] polyethylene glycol (GLYCOLAX) 17 GM/SCOOP powder  (Patient not taking: Reported on 6/14/2023)       Objective     /82 (BP Location: Left arm, Patient Position: Sitting, Cuff Size: Standard)   Pulse 91   Temp 97.8 °F (36.6 °C) (Tympanic)   Resp 14   Ht 5' 5" (1.651 m)   Wt 86.6 kg (191 lb)   SpO2 97%   BMI 31.78 kg/m²     Physical Exam  Constitutional:       General: She is not in acute distress. HENT:      Head: Normocephalic. Mouth/Throat:      Pharynx: No oropharyngeal exudate. Eyes:      General: No scleral icterus. Conjunctiva/sclera: Conjunctivae normal.      Pupils: Pupils are equal, round, and reactive to light. Neck:      Thyroid: No thyromegaly. Cardiovascular:      Rate and Rhythm: Normal rate and regular rhythm. Heart sounds: Normal heart sounds. No murmur heard. Pulmonary:      Effort: Pulmonary effort is normal. No respiratory distress. Breath sounds: Normal breath sounds. No wheezing or rales. Abdominal:      General: Bowel sounds are normal. There is no distension. Palpations: Abdomen is soft. Tenderness: There is no abdominal tenderness. There is no guarding or rebound. Musculoskeletal:         General: No tenderness. Cervical back: Neck supple. Lymphadenopathy:      Cervical: No cervical adenopathy. Skin:     Coloration: Skin is not pale. Findings: No rash. Neurological:      Mental Status: She is alert and oriented to person, place, and time. Sensory: No sensory deficit. Motor: No weakness.        MD Margaret

## 2023-10-02 NOTE — PROGRESS NOTES
BMI Counseling: Body mass index is 31.78 kg/m². The BMI is above normal. Nutrition recommendations include reducing portion sizes.

## 2023-10-03 ENCOUNTER — TELEPHONE (OUTPATIENT)
Dept: FAMILY MEDICINE CLINIC | Facility: CLINIC | Age: 48
End: 2023-10-03

## 2023-10-03 LAB
DSDNA AB SER-ACNC: <1 IU/ML (ref 0–9)
ENA SS-A AB SER-ACNC: <0.2 AI (ref 0–0.9)
ENA SS-B AB SER-ACNC: <0.2 AI (ref 0–0.9)
RHEUMATOID FACT SER QL LA: NEGATIVE

## 2023-10-03 NOTE — TELEPHONE ENCOUNTER
L/m on [facility] oral surgery [phone]. We are waiting for results of CXR for surgery.      We will call when we get the results and if patient is cleared

## 2023-10-08 NOTE — ANESTHESIA PREPROCEDURE EVALUATION
Procedure:  BILATERAL LOWER LINGUAL SACHIN REDUCTION, ALVEOPLASTY ALL FOUR QUADRANTS, UPPER LEFT/ UPPER RIGHT, LOWER LEFT/ LOWER RIGHT (Mouth)    Relevant Problems   CARDIO   (+) Common migraine without aura      MUSCULOSKELETAL   (+) Fibromyalgia      NEURO/PSYCH   (+) Common migraine without aura   (+) Depression   (+) Fibromyalgia   (+) Generalized anxiety disorder with panic attacks        Physical Exam    Airway    Mallampati score: II  TM Distance: >3 FB  Neck ROM: full     Dental   No notable dental hx     Cardiovascular  Cardiovascular exam normal    Pulmonary  Pulmonary exam normal     Other Findings        Anesthesia Plan  ASA Score- 2     Anesthesia Type- general with ASA Monitors. Additional Monitors:   Airway Plan: ETT. Comment: No narcotics-on suboxone    Ativan 1mg preop. Plan Factors-Exercise tolerance (METS): >4 METS. Chart reviewed. EKG reviewed. Imaging results reviewed. Existing labs reviewed. Patient summary reviewed. Patient is not a current smoker. Patient not instructed to abstain from smoking on day of procedure. Patient did not smoke on day of surgery. Obstructive sleep apnea risk education given perioperatively. Induction-     Postoperative Plan-     Informed Consent- Anesthetic plan and risks discussed with patient. I personally reviewed this patient with the CRNA. Discussed and agreed on the Anesthesia Plan with the CRNA. Mei Broussard

## 2023-10-09 ENCOUNTER — HOSPITAL ENCOUNTER (OUTPATIENT)
Facility: HOSPITAL | Age: 48
Setting detail: OUTPATIENT SURGERY
Discharge: HOME | End: 2023-10-09
Attending: DENTIST | Admitting: DENTIST
Payer: MEDICARE

## 2023-10-09 ENCOUNTER — ANESTHESIA (OUTPATIENT)
Dept: PERIOP | Facility: HOSPITAL | Age: 48
End: 2023-10-09
Payer: MEDICARE

## 2023-10-09 VITALS
HEIGHT: 65 IN | WEIGHT: 190 LBS | DIASTOLIC BLOOD PRESSURE: 76 MMHG | OXYGEN SATURATION: 98 % | TEMPERATURE: 97.6 F | SYSTOLIC BLOOD PRESSURE: 140 MMHG | BODY MASS INDEX: 31.65 KG/M2 | RESPIRATION RATE: 17 BRPM | HEART RATE: 98 BPM

## 2023-10-09 DIAGNOSIS — K02.9 DENTAL CARIES: Primary | ICD-10-CM

## 2023-10-09 LAB
EXT PREGNANCY TEST URINE: NEGATIVE
EXT. CONTROL: NORMAL

## 2023-10-09 PROCEDURE — 81025 URINE PREGNANCY TEST: CPT | Performed by: DENTIST

## 2023-10-09 RX ORDER — SODIUM CHLORIDE, SODIUM LACTATE, POTASSIUM CHLORIDE, CALCIUM CHLORIDE 600; 310; 30; 20 MG/100ML; MG/100ML; MG/100ML; MG/100ML
125 INJECTION, SOLUTION INTRAVENOUS CONTINUOUS
Status: DISCONTINUED | OUTPATIENT
Start: 2023-10-09 | End: 2023-10-09 | Stop reason: HOSPADM

## 2023-10-09 RX ORDER — ONDANSETRON 2 MG/ML
INJECTION INTRAMUSCULAR; INTRAVENOUS AS NEEDED
Status: DISCONTINUED | OUTPATIENT
Start: 2023-10-09 | End: 2023-10-09

## 2023-10-09 RX ORDER — DEXAMETHASONE SODIUM PHOSPHATE 10 MG/ML
INJECTION, SOLUTION INTRAMUSCULAR; INTRAVENOUS AS NEEDED
Status: DISCONTINUED | OUTPATIENT
Start: 2023-10-09 | End: 2023-10-09

## 2023-10-09 RX ORDER — OXYMETAZOLINE HYDROCHLORIDE 0.05 G/100ML
SPRAY NASAL AS NEEDED
Status: DISCONTINUED | OUTPATIENT
Start: 2023-10-09 | End: 2023-10-09

## 2023-10-09 RX ORDER — KETAMINE HCL IN NACL, ISO-OSM 100MG/10ML
SYRINGE (ML) INJECTION AS NEEDED
Status: DISCONTINUED | OUTPATIENT
Start: 2023-10-09 | End: 2023-10-09

## 2023-10-09 RX ORDER — MAGNESIUM HYDROXIDE 1200 MG/15ML
LIQUID ORAL AS NEEDED
Status: DISCONTINUED | OUTPATIENT
Start: 2023-10-09 | End: 2023-10-09 | Stop reason: HOSPADM

## 2023-10-09 RX ORDER — IBUPROFEN 600 MG/1
600 TABLET ORAL EVERY 6 HOURS PRN
Qty: 30 TABLET | Refills: 0 | Status: SHIPPED | OUTPATIENT
Start: 2023-10-09 | End: 2023-10-19

## 2023-10-09 RX ORDER — CEFAZOLIN SODIUM 2 G/50ML
SOLUTION INTRAVENOUS AS NEEDED
Status: DISCONTINUED | OUTPATIENT
Start: 2023-10-09 | End: 2023-10-09

## 2023-10-09 RX ORDER — MIDAZOLAM HYDROCHLORIDE 2 MG/2ML
INJECTION, SOLUTION INTRAMUSCULAR; INTRAVENOUS AS NEEDED
Status: DISCONTINUED | OUTPATIENT
Start: 2023-10-09 | End: 2023-10-09

## 2023-10-09 RX ORDER — FENTANYL CITRATE 50 UG/ML
INJECTION, SOLUTION INTRAMUSCULAR; INTRAVENOUS AS NEEDED
Status: DISCONTINUED | OUTPATIENT
Start: 2023-10-09 | End: 2023-10-09

## 2023-10-09 RX ORDER — ROCURONIUM BROMIDE 10 MG/ML
INJECTION, SOLUTION INTRAVENOUS AS NEEDED
Status: DISCONTINUED | OUTPATIENT
Start: 2023-10-09 | End: 2023-10-09

## 2023-10-09 RX ORDER — PROPOFOL 10 MG/ML
INJECTION, EMULSION INTRAVENOUS AS NEEDED
Status: DISCONTINUED | OUTPATIENT
Start: 2023-10-09 | End: 2023-10-09

## 2023-10-09 RX ORDER — AMOXICILLIN AND CLAVULANATE POTASSIUM 875; 125 MG/1; MG/1
1 TABLET, FILM COATED ORAL EVERY 12 HOURS SCHEDULED
Qty: 20 TABLET | Refills: 0 | Status: SHIPPED | OUTPATIENT
Start: 2023-10-09 | End: 2023-10-19

## 2023-10-09 RX ORDER — LIDOCAINE HYDROCHLORIDE 10 MG/ML
INJECTION, SOLUTION EPIDURAL; INFILTRATION; INTRACAUDAL; PERINEURAL AS NEEDED
Status: DISCONTINUED | OUTPATIENT
Start: 2023-10-09 | End: 2023-10-09

## 2023-10-09 RX ORDER — LIDOCAINE HYDROCHLORIDE AND EPINEPHRINE 10; 10 MG/ML; UG/ML
INJECTION, SOLUTION INFILTRATION; PERINEURAL AS NEEDED
Status: DISCONTINUED | OUTPATIENT
Start: 2023-10-09 | End: 2023-10-09 | Stop reason: HOSPADM

## 2023-10-09 RX ORDER — ONDANSETRON 2 MG/ML
4 INJECTION INTRAMUSCULAR; INTRAVENOUS ONCE AS NEEDED
Status: DISCONTINUED | OUTPATIENT
Start: 2023-10-09 | End: 2023-10-09 | Stop reason: HOSPADM

## 2023-10-09 RX ORDER — LORAZEPAM 2 MG/ML
1 INJECTION INTRAMUSCULAR ONCE
Status: COMPLETED | OUTPATIENT
Start: 2023-10-09 | End: 2023-10-09

## 2023-10-09 RX ORDER — BUPIVACAINE HYDROCHLORIDE 2.5 MG/ML
INJECTION, SOLUTION EPIDURAL; INFILTRATION; INTRACAUDAL AS NEEDED
Status: DISCONTINUED | OUTPATIENT
Start: 2023-10-09 | End: 2023-10-09 | Stop reason: HOSPADM

## 2023-10-09 RX ORDER — CHLORHEXIDINE GLUCONATE ORAL RINSE 1.2 MG/ML
15 SOLUTION DENTAL 2 TIMES DAILY
Qty: 120 ML | Refills: 0 | Status: SHIPPED | OUTPATIENT
Start: 2023-10-09

## 2023-10-09 RX ORDER — KETOROLAC TROMETHAMINE 30 MG/ML
30 INJECTION, SOLUTION INTRAMUSCULAR; INTRAVENOUS ONCE
Status: COMPLETED | OUTPATIENT
Start: 2023-10-09 | End: 2023-10-09

## 2023-10-09 RX ADMIN — CEFAZOLIN SODIUM 2000 MG: 2 SOLUTION INTRAVENOUS at 11:37

## 2023-10-09 RX ADMIN — SODIUM CHLORIDE, SODIUM LACTATE, POTASSIUM CHLORIDE, AND CALCIUM CHLORIDE 125 ML/HR: .6; .31; .03; .02 INJECTION, SOLUTION INTRAVENOUS at 08:56

## 2023-10-09 RX ADMIN — MIDAZOLAM 2 MG: 1 INJECTION INTRAMUSCULAR; INTRAVENOUS at 11:19

## 2023-10-09 RX ADMIN — SODIUM CHLORIDE 16 MCG: 900 INJECTION INTRAVENOUS at 11:23

## 2023-10-09 RX ADMIN — SODIUM CHLORIDE 12 MCG: 900 INJECTION INTRAVENOUS at 11:19

## 2023-10-09 RX ADMIN — KETOROLAC TROMETHAMINE 30 MG: 30 INJECTION, SOLUTION INTRAMUSCULAR; INTRAVENOUS at 13:30

## 2023-10-09 RX ADMIN — NASAL DECONGESTANT 2 SPRAY: 0.05 SPRAY NASAL at 11:19

## 2023-10-09 RX ADMIN — DEXAMETHASONE SODIUM PHOSPHATE 10 MG: 10 INJECTION, SOLUTION INTRAMUSCULAR; INTRAVENOUS at 11:27

## 2023-10-09 RX ADMIN — SUGAMMADEX 200 MG: 100 INJECTION, SOLUTION INTRAVENOUS at 12:59

## 2023-10-09 RX ADMIN — FENTANYL CITRATE 50 MCG: 50 INJECTION, SOLUTION INTRAMUSCULAR; INTRAVENOUS at 11:25

## 2023-10-09 RX ADMIN — PROPOFOL 150 MG: 10 INJECTION, EMULSION INTRAVENOUS at 11:25

## 2023-10-09 RX ADMIN — SODIUM CHLORIDE 12 MCG: 900 INJECTION INTRAVENOUS at 11:27

## 2023-10-09 RX ADMIN — LIDOCAINE HYDROCHLORIDE 100 MG: 10 INJECTION, SOLUTION EPIDURAL; INFILTRATION; INTRACAUDAL; PERINEURAL at 11:25

## 2023-10-09 RX ADMIN — LORAZEPAM 1 MG: 2 INJECTION INTRAMUSCULAR; INTRAVENOUS at 08:56

## 2023-10-09 RX ADMIN — SUGAMMADEX 200 MG: 100 INJECTION, SOLUTION INTRAVENOUS at 13:02

## 2023-10-09 RX ADMIN — Medication 50 MG: at 11:27

## 2023-10-09 RX ADMIN — ROCURONIUM BROMIDE 50 MG: 10 INJECTION, SOLUTION INTRAVENOUS at 11:26

## 2023-10-09 RX ADMIN — NASAL DECONGESTANT 2 SPRAY: 0.05 SPRAY NASAL at 11:27

## 2023-10-09 RX ADMIN — PROPOFOL 50 MG: 10 INJECTION, EMULSION INTRAVENOUS at 11:26

## 2023-10-09 RX ADMIN — ONDANSETRON 4 MG: 2 INJECTION INTRAMUSCULAR; INTRAVENOUS at 11:18

## 2023-10-09 NOTE — ANESTHESIA POSTPROCEDURE EVALUATION
Post-Op Assessment Note    CV Status:  Stable  Pain Score: 0    Pain management: adequate     Mental Status:  Alert and awake   Hydration Status:  Euvolemic   PONV Controlled:  None   Airway Patency:  Patent      Post Op Vitals Reviewed: Yes      Staff: CRNA         There were no known notable events for this encounter.     BP  124/64    Temp 99.3   Pulse 78   Resp  12   SpO2 100

## 2023-10-09 NOTE — DISCHARGE INSTR - AVS FIRST PAGE
Do not spit or drink through a straw for 72 hours  Cold compress on the face on both sides for 72 hours  Cold liquid and soft diet for 72 hours

## 2023-10-09 NOTE — INTERVAL H&P NOTE
H&P reviewed. After examining the patient I find no changes in the patients condition since the H&P had been written.     Vitals:    10/09/23 0745   BP: 138/73   Pulse: (!) 110   Resp: 18   Temp: 98.7 °F (37.1 °C)   SpO2: 97%

## 2023-10-09 NOTE — OP NOTE
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 ALVEOPLASTY ALL FOUR QUADRANTS. UPPER LEFT/ UPPER RIGHT. 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 She was unsuccessfully induced and nasotracheally intubated by anesthesia team.  She was then scrubbed and draped in a sterile fashion by the surgical team.  A timeout was then performed. Attention was given to the oral cavity where 10 cc of lidocaine with epinephrine was used to infiltrate the maxilla and mandible bilaterally. Attention was then given to the maxilla initially. A crestal incision with bilateral posterior releases was made using Bovie cautery. A subperiosteal dissection was then performed to expose the alveolar portion of the maxilla. Using a watery with a football bur excess mandibular bone was removed. Using a bone file any sharp pledgets were smoothed with. The area was then irrigated with a copious normal saline. The incision was then closed using a running 3-0 Chromic Gut suture. Attention was then given to the mandible where a Bovie cautery was used to make a crestal incision with bilateral posterior release.   A subperiosteal dissection was performed to allow visualization and isolation of the bilateral mandibular lingual sachin and the alveolus bilaterally. Using a combination of the football bur and round bur excess bone was removed. A bone file was then used to smooth out any sharp bony ledges. Copious irrigation with normal saline was then performed. The incision was then closed with a running 3-0 Chromic Gut suture. 10 cc of Marcaine was then given in the maxilla and the mandible bilaterally to allow for postop pain control. I was present for the entire procedure.     Patient Disposition:  PACU         SIGNATURE: Sean Aponte  DATE: October 9, 2023  TIME: 1:02 PM

## 2023-10-13 ENCOUNTER — TELEPHONE (OUTPATIENT)
Dept: PSYCHIATRY | Facility: CLINIC | Age: 48
End: 2023-10-13

## 2023-10-13 NOTE — TELEPHONE ENCOUNTER
Pt called the office to have her appt on 10/16/2023  with Dr Jeannie Xavier switched to virtual. Pt stated she just had mouth surgery. Writer was able to switch this with the link going to the cell number on file.

## 2023-10-16 ENCOUNTER — TELEMEDICINE (OUTPATIENT)
Dept: PSYCHIATRY | Facility: CLINIC | Age: 48
End: 2023-10-16
Payer: COMMERCIAL

## 2023-10-16 ENCOUNTER — CLINICAL SUPPORT (OUTPATIENT)
Dept: OBGYN CLINIC | Facility: CLINIC | Age: 48
End: 2023-10-16

## 2023-10-16 VITALS
SYSTOLIC BLOOD PRESSURE: 121 MMHG | DIASTOLIC BLOOD PRESSURE: 75 MMHG | BODY MASS INDEX: 32.32 KG/M2 | HEART RATE: 85 BPM | WEIGHT: 194 LBS | HEIGHT: 65 IN

## 2023-10-16 DIAGNOSIS — F51.04 PSYCHOPHYSIOLOGICAL INSOMNIA: ICD-10-CM

## 2023-10-16 DIAGNOSIS — F41.0 GENERALIZED ANXIETY DISORDER WITH PANIC ATTACKS: Primary | ICD-10-CM

## 2023-10-16 DIAGNOSIS — F41.1 GENERALIZED ANXIETY DISORDER WITH PANIC ATTACKS: Primary | ICD-10-CM

## 2023-10-16 DIAGNOSIS — Z30.09 UNWANTED FERTILITY: Primary | ICD-10-CM

## 2023-10-16 PROCEDURE — 99213 OFFICE O/P EST LOW 20 MIN: CPT | Performed by: STUDENT IN AN ORGANIZED HEALTH CARE EDUCATION/TRAINING PROGRAM

## 2023-10-16 PROCEDURE — 96372 THER/PROPH/DIAG INJ SC/IM: CPT

## 2023-10-16 RX ORDER — ALPRAZOLAM 1 MG/1
TABLET ORAL
Qty: 75 TABLET | Refills: 2 | Status: SHIPPED | OUTPATIENT
Start: 2023-10-16

## 2023-10-16 RX ADMIN — MEDROXYPROGESTERONE ACETATE 150 MG: 150 INJECTION, SUSPENSION INTRAMUSCULAR at 10:45

## 2023-10-16 NOTE — BH TREATMENT PLAN
TREATMENT PLAN (Medication Management Only)        [address] [facility]    Name and Date of Birth:  Fidencio De Leon 53 y.o. 1975  Date of Treatment Plan: October 16, 2023  Diagnosis/Diagnoses:    1. Generalized anxiety disorder with panic attacks    2. Psychophysiological insomnia      Strengths/Personal Resources for Self-Care: supportive family, taking medications as prescribed, ability to communicate needs, ability to communicate well, average or above intelligence. Area/Areas of need (in own words): anxiety symptoms  1. Long Term Goal: maintain control of anxiety. Target Date:6 months - 4/16/2024  Person/Persons responsible for completion of goal: Shereen  2. Short Term Objective (s) - How will we reach this goal?:   A. Provider new recommended medication/dosage changes and/or continue medication(s): continue current medications as prescribed. B. Attend medication management appointments regularly. C. Take psychiatric medications responsibly. Target Date:6 months - 4/16/2024  Person/Persons Responsible for Completion of Goal: Shereen  Progress Towards Goals: stable  Treatment Modality: medication management every 3 months  Review due 180 days from date of this plan: 6 months - 4/16/2024  Expected length of service: ongoing treatment  My Physician/PA/NP and I have developed this plan together and I agree to work on the goals and objectives. I understand the treatment goals that were developed for my treatment. Treatment Plan was completed with Shereen's assistance and input throughout today's session. Shereen consented to the information provided and documented above. Unfortunately, treatment plan was not signed at the time of this office visit secondary to issues related to signature keypad.

## 2023-10-16 NOTE — PSYCH
MEDICATION MANAGEMENT NOTE         [facility]      Name and Date of Birth:  Marvin Pollock 53 y.o. 1975 MRN: [id number]    Date of Visit: October 16, 2023    Reason for Visit: Follow-up visit for medication management     Virtual Visit Disclaimer:       TeleMed provider: Kathrin Lorenzana MD.   Location: [place]     Verification of patient location:     Patient is currently located in the state [facility]  Patient is currently located in a state in which I am licensed     After connecting through [employer], the patient was identified by name and date of birth. Marvin Pollock was informed that this is a telemedicine visit that is being conducted through [address] [place] [address] Now, and the patient was informed that this is a secure, HIPAA-compliant platform. My office door was closed. No one else was in the room. Marvin Pollock acknowledged consent and understanding of privacy and security of the video platform. Annette Larson understands that the online visit is based solely on information provided by the patient, and that, in the absence of a face-to-face physical evaluation by the physician, the diagnosis Annette Snehanasrin  receives is both limited and provisional in terms of accuracy and completeness. Marvin Pollock understands that they can discontinue the visit at any time. I informed Annette Larson that I have reviewed their record in EPIC and presented the opportunity for them to ask any questions regarding the visit today. Marvin Pollock voiced understanding and consented to these terms. Annette Larson is aware this is a billable service. Annette Larson is present at home.       SUBJECTIVE:    Marvin Pollock is a 53 y.o. female with past psychiatric history significant for generalized anxiety disorder with panic attacks, opiate dependence (Rx Zubsolv via [facility]), insomnia, and extensive history of polysubstance abuse (cannabis, hallucinogens, cocaine)  who was personally seen and evaluated today at the [facility] outpatient clinic for follow-up and medication management. Barrera Terrell presents as pleasant and cooperative. Her thoughts are organized and linear. Shereen endorses compliance with psychotropic medication regimen consisting of Xanax. She denies adverse medication side effects. PDMP website reviewed, no acute concerns for abuse or misuse. Shereen had oral surgery 1 week ago. She is slowly recovering from this. She reports immense pain and discomfort. Supportive therapy provided. At the moment, Barrera Terrell endorses psychiatric stability. Her sleep has improved since she moved from her bed and is now "sleeping upright". Since making this transition, her body-wide pain has almost resolved. Her appetite is erratic secondary to oral pain. She denies SI/HI. Her energy and motivation are stable. She is without anhedonia or crying spells. Barrera Terrell reports some anxiety related to the recovery process but is not tense, on-edge, or restless. I spoke with her Nadia Leavitt following last visit regarding her Xanax script. He was receptive. Barrera Terrell speaks to future plans of attending her 's shows now that she will be more confident with her oral procedure. She currently rates her overall state of MH today as a "8/10" (10 being best, 0 being worst). She continues to enjoy time with her grandson who brings her meaning. During today's examination, Barrera Terrell does not exhibit objective evidence of j carlos/hypomania or psychosis. Barrera Terrell is not currently irritable, grandiose, labile, or pathologically euphoric. Barrera Terrell is without perceptual disturbances, such as A/V hallucinations, paranoia, ideas of reference, or delusional beliefs. Barrera Terrell denies recent ETOH or illicit substance abuse. She recently celebrated her 6 year sobriety date in September. She was applauded for this. She offers no further concerns. Current Rating Scores:     None completed today.     Review Of Systems:      Constitutional negative   ENT negative   Cardiovascular negative   Respiratory negative   Gastrointestinal negative   Genitourinary negative   Musculoskeletal negative   Integumentary negative   Neurological negative   Endocrine negative   Other Symptoms none, all other systems are negative       Past Psychiatric History: (unchanged information from previous note copied and italicized) - Information that is bolded has been updated. Inpatient psychiatric admissions: Numerous past psychiatric admissions - last in 2017 at [facility] for depression, SI in context of drug use  Prior outpatient psychiatric linkage: Numerous psychiatrists in the past - Last linkage with [facility] via [facility]  Past/current psychotherapy: History of psychotherapy linkage, no current therapists  History of suicidal attempts/gestures: Numerous attempts in the past including overdose and "cutting wrists"  History of violence/aggressive behaviors: Denies  Psychotropic medication trials: Lexapro, Prozac, Zoloft, Paxil, Remeron, Trazodone, Wellbutrin, BuSpar, VPA, Lithium, Risperdal, Seroquel, Gabapentin, Propranolol, Clonidine, Valium, Xanax, Klonopin, Adderall, Ativan, Librium, Provigil, Pristiq (too much GI discomfort)    Substance abuse inpatient/outpatient rehabilitation: 10+ rehab/detox admissions - first at the age of 17 in [place] - last in 2017 at [facility]     Substance Abuse History: (unchanged information from previous note copied and italicized) - Information that is bolded has been updated. Extensive history of illict substance (cocaine, methamphetamine, cannabis, opiates, hallucinogen), and tobacco abuse. She denies a history of ETOH abuse. No past legal actions yet 1 prior arrest secondary to substance intoxication. The patient denies prior DWIs/DUIs. Extensive history of outpatient/inpatient rehabilitation programs.  Cathy Morrow does not exhibit objective evidence of substance withdrawal during today's examination nor does Shereen appear under the influence of any psychoactive substance. Social History: (unchanged information from previous note copied and italicized) - Information that is bolded has been updated. Developmental: Denies a history of milestone/developmental delay. Denies a history of in-utero exposure to toxins/illicit substances. There is no documented history of IEP or need for special education. Education: college graduate - [address]  Marital history:   Living arrangement, social support: , has 2 children (daughter and son)  Occupational History: on permanent disability  Access to firearms: Denies direct access to weapons/firearms. Neno Orellana has no history of arrests or violence with a deadly weapon. Traumatic History: (unchanged information from previous note copied and italicized) - Information that is bolded has been updated. Abuse:none is reported  Other Traumatic Events: Father dying in 2017 was problematic       Past Medical History:    Past Medical History:   Diagnosis Date    Addiction to drug [facility])     Anxiety     Arthritis     Depression     Endometriosis     no current issues    Infectious viral hepatitis     history of hep c    Irritable bowel syndrome     Substance abuse ([facility])         Past Surgical History:   Procedure Laterality Date    ALVEOPLASTY N/A 10/9/2023    Procedure: BILATERAL LOWER LINGUAL SACHIN REDUCTION, ALVEOPLASTY ALL FOUR QUADRANTS, UPPER LEFT/ UPPER RIGHT, LOWER LEFT/ LOWER RIGHT;  Surgeon: Raphael Zhang;   Location: [facility];  Service: Maxillofacial    APPENDECTOMY      CHOLECYSTECTOMY      HEMORRHOID SURGERY       Allergies   Allergen Reactions    Ciprofloxacin Anaphylaxis     Interacts with Zubsolv    Codeine Lightheadedness     Reaction Date: 07Jun2011;     Morphine Lightheadedness     Reaction Date: 07Jun2011;     Sulfa Antibiotics Anaphylaxis and Rash    Tramadol Lightheadedness    Bactrim [Sulfamethoxazole-Trimethoprim] Hives       Substance Abuse History:    Social History     Substance and Sexual Activity   Alcohol Use Not Currently    Comment: rarely/holidays     Social History     Substance and Sexual Activity   Drug Use Not Currently    Types: Heroin, Cocaine       Social History:    Social History     Socioeconomic History    Marital status: /Civil Union     Spouse name: Henry Felix    Number of children: 2    Years of education: Not on file    Highest education level: Not on file   Occupational History    Occupation: homemaker   Tobacco Use    Smoking status: Former     Types: Cigarettes     Quit date: 3/14/2019     Years since quittin.5    Smokeless tobacco: Never   Vaping Use    Vaping Use: Never used   Substance and Sexual Activity    Alcohol use: Not Currently     Comment: rarely/holidays    Drug use: Not Currently     Types: Heroin, Cocaine    Sexual activity: Yes     Partners: Male     Birth control/protection: Injection   Other Topics Concern    Not on file   Social History Narrative    Not on file     Social Determinants of Health     Financial Resource Strain: Low Risk  (2023)    Overall Financial Resource Strain (CARDIA)     Difficulty of Paying Living Expenses: Not hard at all   Food Insecurity: No Food Insecurity (2023)    Hunger Vital Sign     Worried About Running Out of Food in the Last Year: Never true     [address] in the Last Year: Never true   Transportation Needs: No Transportation Needs (2023)    PRAPARE - Transportation     Lack of Transportation (Medical): No     Lack of Transportation (Non-Medical): No   Physical Activity: Insufficiently Active (2019)    Exercise Vital Sign     Days of Exercise per Week: 3 days     Minutes of Exercise per Session: 30 min   Stress: No Stress Concern Present (3/22/2022)    [address] [place] [address]     Feeling of Stress :  Only a little   Social Connections: Unknown (2019)    Social Connection and Isolation Panel [NHANES]     Frequency of Communication with Friends and Family: Patient refused     Frequency of Social Gatherings with Friends and Family: Patient refused     Attends Synagogue Services: Patient refused     Active Member of Clubs or Organizations: Patient refused     Attends Club or Organization Meetings: Patient refused     Marital Status: Patient refused   Intimate Partner Violence: Not At Risk (9/19/2019)    Humiliation, Afraid, Rape, and Kick questionnaire     Fear of Current or Ex-Partner: No     Emotionally Abused: No     Physically Abused: No     Sexually Abused: No   Housing Stability: Low Risk  (3/22/2022)    Housing Stability Vital Sign     Unable to Pay for Housing in the Last Year: No     Number of [address] in the Last Year: 1     Unstable Housing in the Last Year: No       Family Psychiatric History:     Family History   Problem Relation Age of Onset    Cancer Father     Drug abuse Father     Drug abuse Mother     No Known Problems Sister     No Known Problems Daughter     No Known Problems Maternal Grandmother     Breast cancer Maternal Grandfather 80    Prostate cancer Maternal Grandfather     Melanoma Maternal Grandfather     Colon cancer Maternal Grandfather 79    No Known Problems Paternal Grandmother     No Known Problems Paternal Grandfather     No Known Problems Daughter     No Known Problems Maternal Aunt        History Review: The following portions of the patient's history were reviewed and updated as appropriate: allergies, current medications, past family history, past medical history, past social history, past surgical history, and problem list.         OBJECTIVE:     Vital signs in last 24 hours: There were no vitals filed for this visit.     Mental Status Evaluation:    Appearance age appropriate, casually dressed, dressed appropriately, looks stated age, tattooed   Behavior pleasant, cooperative, calm   Speech normal rate, normal volume, normal pitch   Mood euthymic Affect normal range and intensity, appropriate   Thought Processes organized, logical, goal directed   Associations intact associations   Thought Content no overt delusions   Perceptual Disturbances: no auditory hallucinations, no visual hallucinations   Abnormal Thoughts  Risk Potential Suicidal ideation - None at present  Homicidal ideation - None at present  Potential for aggression - No   Orientation oriented to person, place, time/date, and situation   Memory recent and remote memory grossly intact   Consciousness alert and awake   Attention Span Concentration Span attention span and concentration are age appropriate   Intellect appears to be of average intelligence   Insight intact and good   Judgement intact and good   Muscle Strength and  Gait unable to assess today due to virtual visit   Motor activity no abnormal movements   Language no difficulty naming common objects   Fund of Knowledge adequate knowledge of current events   Pain moderate   Pain Scale Did not ask patient to formally rate       Laboratory Results: I have personally reviewed all pertinent laboratory/tests results    Recent Labs (last 2 months):    Admission on 10/09/2023, Discharged on 10/09/2023   Component Date Value    EXT Preg Test, Ur 10/09/2023 Negative     Control 10/09/2023 Valid    Appointment on 10/02/2023   Component Date Value    Color, UA 10/02/2023 Yellow     Clarity, UA 10/02/2023 Turbid     Specific Gravity, UA 10/02/2023 1.021     pH, UA 10/02/2023 6.0     Leukocytes, UA 10/02/2023 Large (A)     Nitrite, UA 10/02/2023 Negative     Protein, UA 10/02/2023 Trace (A)     Glucose, UA 10/02/2023 Negative     Ketones, UA 10/02/2023 Negative     Urobilinogen, UA 10/02/2023 <2.0     Bilirubin, UA 10/02/2023 Negative     Occult Blood, UA 10/02/2023 Negative     Rheumatoid Factor 10/02/2023 Negative     BLANKA 10/02/2023 Negative     Sed Rate 10/02/2023 34 (H)     Ferritin 10/02/2023 37     CRP 10/02/2023 1.4     ds DNA Ab 10/02/2023 <1     SS-A (RO) Ab 10/02/2023 <0.2     SS-B (LA) Ab 10/02/2023 <0.2     Sodium 10/02/2023 140     Potassium 10/02/2023 3.7     Chloride 10/02/2023 105     CO2 10/02/2023 29     ANION GAP 10/02/2023 6     BUN 10/02/2023 14     Creatinine 10/02/2023 0.73     Glucose, Fasting 10/02/2023 81     Calcium 10/02/2023 9.0     AST 10/02/2023 21     ALT 10/02/2023 19     Alkaline Phosphatase 10/02/2023 53     Total Protein 10/02/2023 6.8     Albumin 10/02/2023 4.3     Total Bilirubin 10/02/2023 0.30     eGFR 10/02/2023 98     WBC 10/02/2023 5.18     RBC 10/02/2023 4.57     Hemoglobin 10/02/2023 12.7     Hematocrit 10/02/2023 39.7     MCV 10/02/2023 87     MCH 10/02/2023 27.8     MCHC 10/02/2023 32.0     RDW 10/02/2023 12.1     MPV 10/02/2023 10.9     Platelets 65/67/4127 247     nRBC 10/02/2023 0     Neutrophils Relative 10/02/2023 53     Immat GRANS % 10/02/2023 0     Lymphocytes Relative 10/02/2023 37     Monocytes Relative 10/02/2023 6     Eosinophils Relative 10/02/2023 3     Basophils Relative 10/02/2023 1     Neutrophils Absolute 10/02/2023 2.79     Immature Grans Absolute 10/02/2023 0.01     Lymphocytes Absolute 10/02/2023 1.92     Monocytes Absolute 10/02/2023 0.29     Eosinophils Absolute 10/02/2023 0.14     Basophils Absolute 10/02/2023 0.03     Magnesium 10/02/2023 2.1     Cholesterol 10/02/2023 190     Triglycerides 10/02/2023 54     HDL, Direct 10/02/2023 61     LDL Calculated 10/02/2023 118 (H)     Non-HDL-Chol (CHOL-HDL) 10/02/2023 129     TSH 3RD GENERATON 10/02/2023 2.186     Free T4 10/02/2023 0.80     Vitamin B-12 10/02/2023 409     Vit D, 25-Hydroxy 10/02/2023 37.5     Protime 10/02/2023 12.7     INR 10/02/2023 0.96     PTT 10/02/2023 27     RBC, UA 10/02/2023 1-2     WBC, UA 10/02/2023 20-30 (A)     Epithelial Cells 10/02/2023 Moderate (A)     Bacteria, UA 10/02/2023 Innumerable (A)     MUCUS THREADS 10/02/2023 Moderate (A)     Ca Oxalate Glo, UA 10/02/2023 Moderate (A)        Suicide/Homicide Risk Assessment:    The following interventions are recommended: contracts for safety at present - agrees to go to ED if feeling unsafe      Lethality Statement:    Based on today's assessment and clinical criteria, Ana Mendez contracts for safety and is not an imminent risk of harm to self or others. Outpatient level of care is deemed appropriate at this current time. Samantha Hyman understands that if they can no longer contract for safety, they need to call the office or report to their nearest Emergency Room for immediate evaluation. Assessment/Plan:     Ana Mendez is a 53 y.o. female with past psychiatric history significant for generalized anxiety disorder with panic attacks, opiate dependence (Rx Zubsolv via [facility]), insomnia, and extensive history of polysubstance abuse (cannabis, hallucinogens, cocaine) who was personally seen and evaluated today at the [address] [place] [address] outpatient clinic for follow-up and medication management. Samantha Hyman endorses a longstanding history of anxiety and episodic depression that served as the catalyst for her substance abuse. She states that her biological parents abused illicit substances and thus, they were emotionally neglectful and inconsistent. At the age of 16, Samantha Hyman began using cannabis which ultimately lead to use of numerous illicit substances. Shereen reports placement in 10+ detox/rehabilitation programs throughout the course of her life. Her first rehabilitation program was at age 17 in [place]. She last attended rehab via [facility] in 2017, following the death of her father and and subsequent inpatient admission at [facility]. She endorses sobriety from opiates and other illicit substances for the last 3+ years. She was recently psychiatrically managed via HILARIO Waddell Sa through [facility].  However, given their change in policy regarding concurrent use of opiates and benzodiazepines, Samantha Hyman was transferred to [facility] for safer, more appropriate tapering process. Ernestina Lacy has been treated with benzodiazepines since her early 20's. She was previously on Xanax 6mg Daily (2mg TID) but was recently tapered to 4mg Daily (2mg AM, 1mg afternoon, 1mg QHS) over the last 8 months. PDMP webiste reviewed, no concerns for abuse or misuse. I spoke at length with Ernestina Lacy about 's policy regarding concurrent use of opiates and benzodiazepines and risks/alternatives to treatment. She voiced understanding regarding the need to taper off Xanax and was agreeable to do so. I also discussed possibility of faster tapering process via detox program at [facility], to which she was resistant given responsibility of caring for her family. Ernestina Lacy denies most neurovegetative symptomatology suggestive of major depressive disorder. Ernestina Lacy adamantly denies acute thoughts of suicide or self-harm. Ernestina Lacy has no plans to harm others. There is a documented history of prior suicidal gestures/suicidal attempts. Ernestina Lacy admits to overdosing and "cutting her wrists" in the past. She has not harmed herself or attempted to end her life in 4+ years. Ernestina Lacy endorses a historical struggle with anxiety and symptomatology consistent with generalized anxiety disorder. Ernestina Lacy reports anxiety that is pathologic in nature. Shereen endorses excessive nervousness, irrational worry, and overt anxiousness. Ernestina Lacy is not pervasively restless or tense but does report feeling episodically keyed-up and on-edge. Shereen experiences disruption in energy and concentration secondary to anxiety. There is evidence to suggest that Shereen experiences irritability, inability to relax, and disruption in sleep in the past, secondary to pathologic anxiety. Ernestina Lacy denies new-onset panic symptomatology but does report a history of panic attacks characterized by tremor, SOB, fear of impending doom, and overt anxiousness. She denies maladaptive behaviors.  Ernestina Lacy vehemently denies any acute or chronic history suggestive of an underlying affective (bipolar) organization. Karina Jacobs denies historical symptomatology suggestive of an underlying psychotic process. Karina Jacobs denies historical symptomatology suggestive of PTSD, OCD, or disordered eating. Today's Plan:     Psychopharmacologically, Karina Jacobs reports benefit and tolerability with Xanax. Will postpone further tapering until oral procedure recovery is complete. No lethality concerns. No suspicion of substance abuse or misuse. DSM-V Diagnoses:      1.) Generalized Anxiety Disorder with Panic Attacks   2.) Opiate dependence, continuous  3.) Polysubstance abuse by history   4.) Insomnia        Treatment Recommendations/Precautions:     1.) Generalized Anxiety Disorder with Panic Attacks   - Continue Xanax 2mg afternoon, 0.5mg QHS (total of 0.5mg reduction) - tapering process in effect (was previously on high doses)              - I inherited Shereen on high-dose Xanax - plan has been to ultimately taper off Xanax given concurrent use of Suboxone, however, Karina Jacobs has been on BZ for 20+ years so tapering process will be arduous      2.) Opiate dependence, continuous  - [facility]       3.) Polysubstance abuse by history   - Currently sober  - Counseled to avoid ETOH, illict substances, and nicotine secondary to the detrimental effects of these substances on mental and physical health     4.) Insomnia  -No Tx    Medication management every 4 months  Aware of need to follow up with family physician for medical issues  Aware of 24 hour and weekend coverage for urgent situations accessed by calling [facility] main practice number    Medications Risks/Benefits      Risks, Benefits And Possible Side Effects Of Medications:    Risks, benefits, and possible side effects of medications explained to Karina Jacobs including risks of misuse, abuse or dependence, sedation and respiratory depression related to treatment with benzodiazepine medications.  She verbalizes understanding and agreement for treatment. Controlled Medication Discussion:     Stephen Galeas has been filling controlled prescriptions on time as prescribed according to [address] [facility]  Discussed with Stephen Galeas the risks of sedation, respiratory depression, impairment of ability to drive and potential for abuse and addiction related to treatment with benzodiazepine medications. She understands risk of treatment with benzodiazepine medications, agrees to not drive if feels impaired and agrees to take medications as prescribed  Discussed with [facility] warning on concurrent use of benzodiazepines and opioid medications including sedation, respiratory depression, coma and death. She understands the risk of treatment with benzodiazepines in addition to opioids and wants to continue taking those medications    Psychotherapy Provided:     Individual psychotherapy provided: No     Treatment Plan:    Completed and signed during the session: Yes - Treatment Plan done but not signed at time of office visit due to:  Plan reviewed by video and verbal consent given due to virtual visit. Visit Time    Visit Start Time: 3:30 PM  Visit Stop Time: 3:51 PM  Total Visit Duration:  21 minutes     The total visit duration detailed above includes: patient engagement, medication management, psychotherapy/counseling, discussion regarding treatment goals, documentation, review of past medical records, and coordination of care. Note Share Disclaimer:      This note was not shared with the patient due to reasonable likelihood of causing patient harm      Armani Wu MD  Board Certified Diplomate of the [address] [place] [address] of Psychiatry and Neurology  10/16/23

## 2023-10-17 ENCOUNTER — TELEPHONE (OUTPATIENT)
Dept: PSYCHIATRY | Facility: CLINIC | Age: 48
End: 2023-10-17

## 2023-10-17 NOTE — TELEPHONE ENCOUNTER
Called and left message for Patient to return call to [phone] and schedule 4 month follow up with provider (2/16/2024). Please Schedule.

## 2023-10-19 ENCOUNTER — TELEPHONE (OUTPATIENT)
Dept: PSYCHIATRY | Facility: CLINIC | Age: 48
End: 2023-10-19

## 2023-10-19 NOTE — TELEPHONE ENCOUNTER
Patient contacted the office to schedule a follow up visit with provider. Patient is now scheduled for 2/26/2024  at 4 pm in office.

## 2023-12-06 ENCOUNTER — OFFICE VISIT (OUTPATIENT)
Dept: OBGYN CLINIC | Facility: CLINIC | Age: 48
End: 2023-12-06

## 2023-12-06 VITALS
WEIGHT: 194.4 LBS | BODY MASS INDEX: 32.35 KG/M2 | SYSTOLIC BLOOD PRESSURE: 111 MMHG | DIASTOLIC BLOOD PRESSURE: 78 MMHG | HEART RATE: 102 BPM

## 2023-12-06 DIAGNOSIS — N81.4 UTERINE PROLAPSE: ICD-10-CM

## 2023-12-06 DIAGNOSIS — Z01.419 ENCOUNTER FOR ANNUAL ROUTINE GYNECOLOGICAL EXAMINATION: ICD-10-CM

## 2023-12-06 DIAGNOSIS — Z30.42 ENCOUNTER FOR SURVEILLANCE OF INJECTABLE CONTRACEPTIVE: ICD-10-CM

## 2023-12-06 DIAGNOSIS — Z12.4 SCREENING FOR CERVICAL CANCER: ICD-10-CM

## 2023-12-06 DIAGNOSIS — Z12.31 ENCOUNTER FOR SCREENING MAMMOGRAM FOR MALIGNANT NEOPLASM OF BREAST: Primary | ICD-10-CM

## 2023-12-06 PROCEDURE — 99396 PREV VISIT EST AGE 40-64: CPT | Performed by: NURSE PRACTITIONER

## 2023-12-06 RX ORDER — MEDROXYPROGESTERONE ACETATE 150 MG/ML
150 INJECTION, SUSPENSION INTRAMUSCULAR
Qty: 1 ML | Refills: 5 | Status: SHIPPED | OUTPATIENT
Start: 2023-12-06

## 2023-12-06 NOTE — PATIENT INSTRUCTIONS
Call with needs or concerns  Schedule appontment with [facility] to discuss uterine prolapse  Keep mammogram appointment  Return in 1 year

## 2023-12-06 NOTE — PROGRESS NOTES
Annual Exam    Assessment   1. Encounter for screening mammogram for malignant neoplasm of breast        2. Screening for cervical cancer  Ambulatory referral to Obstetrics / Gynecology      3. Encounter for annual routine gynecological examination        4. Encounter for surveillance of injectable contraceptive  medroxyPROGESTERone (DEPO-PROVERA) 150 mg/mL injection        well woman       Plan       All questions answered. Breast self exam technique reviewed and patient encouraged to perform self-exam monthly. Contraception: Depo-Provera injections. Discussed healthy lifestyle modifications. Follow up in 1 year. Mammogram.     Patient Instructions   Call with needs or concerns  Schedule appontment with [facility] to discuss uterine prolapse  Keep mammogram appointment  Return in 1 year  Pt verbalized understanding of all discussed. Arianne Luevano is a 53 y.o. [id number] female who presents for annual well woman exam. Periods are not occurring with Depo, lasting 0 days. No vaginal discharge. 9/8/2020 WNL PAP and negative HPV  Pt states she feels like something is "falling" in her vagina she notices it when trying to have a BM and needs to push it up in order to complete the BM, pt also says her vagina feels swollen during intercourse    Explained uterine prolapse is noted with valsalva and with cough, explained she would see a doctor to discuss options    Depression Screening Follow-up Plan: Patient's depression screening was negative with a PHQ-2 score of 0. Their PHQ-9 score was 2. Clinically patient does not have depression. No treatment is required.       Current contraception: Depo-Provera injections  History of abnormal Pap smear: no  Family history of uterine or ovarian cancer: no  Regular self breast exam: yes  History of abnormal mammogram: no  Family history of breast cancer: yes - Grandfather, HOPE line provided  History of abnormal lipids: unknown  Menstrual History:  OB History    4    Para   2    Term   2            AB   2    Living   2         SAB        IAB   2    Ectopic        Multiple        Live Births   2                Menarche age: 6  No LMP recorded. Patient has had an injection. The following portions of the patient's history were reviewed and updated as appropriate: allergies, current medications, past family history, past medical history, past social history, past surgical history and problem list.    Review of Systems  Pertinent items are noted in HPI.       Objective      /78   Pulse 102   Wt 88.2 kg (194 lb 6.4 oz)   BMI 32.35 kg/m²     General: alert and oriented, in no acute distress, alert, appears stated age, and cooperative   Heart: NSR   Lungs: clear to auscultation bilaterally, WNL respiratory effort, negative cough or SOB   Thyroid: Negative masses palpable   Abdomen: soft, non-tender, without masses or organomegaly palpable   Vulva: normal   Vagina: normal mucosa   Cervix: no cervical motion tenderness and no lesions   Uterus: normal size, non-tender, normal shape and consistency, prolapse noted with valsalva or cough   Adnexa: normal adnexa   Urethra: normal   Breasts: NT,negative masses palpable, negative discharge, or dimpling

## 2023-12-22 DIAGNOSIS — F41.0 GENERALIZED ANXIETY DISORDER WITH PANIC ATTACKS: ICD-10-CM

## 2023-12-22 DIAGNOSIS — F41.1 GENERALIZED ANXIETY DISORDER WITH PANIC ATTACKS: ICD-10-CM

## 2023-12-22 RX ORDER — ALPRAZOLAM 1 MG/1
TABLET ORAL
Qty: 75 TABLET | Refills: 2 | Status: CANCELLED | OUTPATIENT
Start: 2023-12-22

## 2023-12-22 NOTE — TELEPHONE ENCOUNTER
Medication Refill Request     Name of Medication xanax  Dose/Frequency 1mg take 2mg each afternoon and 0.5mg each evening  Quantity 75  Verified pharmacy   [x]  Verified ordering Provider   [x]  Does patient have enough for the next 3 days? Yes [] No [x]  Does patient have a follow-up appointment scheduled? Yes [x] No []   If so when is appointment: 2/26/2024 4pm

## 2023-12-22 NOTE — TELEPHONE ENCOUNTER
Called [facility].. 1 refill left on Xanax. Will prepare for       Spoke with Shereen and made aware

## 2023-12-27 ENCOUNTER — OFFICE VISIT (OUTPATIENT)
Dept: OBGYN CLINIC | Facility: CLINIC | Age: 48
End: 2023-12-27

## 2023-12-27 VITALS
SYSTOLIC BLOOD PRESSURE: 138 MMHG | WEIGHT: 196.2 LBS | DIASTOLIC BLOOD PRESSURE: 75 MMHG | HEART RATE: 104 BPM | BODY MASS INDEX: 32.65 KG/M2

## 2023-12-27 DIAGNOSIS — N81.10 PROLAPSE OF ANTERIOR VAGINAL WALL: Primary | ICD-10-CM

## 2023-12-27 DIAGNOSIS — N81.6 PROLAPSE OF POSTERIOR VAGINAL WALL: ICD-10-CM

## 2023-12-27 PROCEDURE — 99213 OFFICE O/P EST LOW 20 MIN: CPT | Performed by: OBSTETRICS & GYNECOLOGY

## 2023-12-27 NOTE — PROGRESS NOTES
Subjective:     Shereen Paris is a 48 y.o.  female who presents for evaluation of prolapse. She strains with emptying her urine due to shyness. She denies urinary or fecal incontinence. She feels pressure with bowel movements and does some stenting to get bowel movements out. She feels all of her stenting is due to shyness and anxiety. She does feel occasional pelvic pressure but nothing consistent.    Objective:    Vitals: Blood pressure 138/75, pulse 104, weight 89 kg (196 lb 3.2 oz).Body mass index is 32.65 kg/m².    Physical Exam  Constitutional:       Appearance: She is well-developed.   Genitourinary:      No vaginal discharge or bleeding.      No vaginal atrophy present.       Right Adnexa: not tender and no mass present.     Left Adnexa: not tender and no mass present.     No cervical motion tenderness, discharge or lesion.   POP-Q measurements were:      Aa: -1, Ba: -3, C: -8     gH: 2, pB: 3, TVL: 9     Ap: -1, Bp: -3, D:  Cardiovascular:      Rate and Rhythm: Normal rate and regular rhythm.      Heart sounds: Normal heart sounds. No murmur heard.     No friction rub. No gallop.   Pulmonary:      Effort: Pulmonary effort is normal. No respiratory distress.      Breath sounds: No wheezing.   Abdominal:      Palpations: Abdomen is soft.      Tenderness: There is no abdominal tenderness.   Musculoskeletal:         General: No tenderness.   Neurological:      Mental Status: She is alert and oriented to person, place, and time.   Vitals reviewed.          Assessment/Plan:  Mild anterior wall and posterior wall prolapse  -Discussed kegel exercises to strengthen the muscles        Nikki Haynes MD  2023  11:58 AM

## 2024-01-08 ENCOUNTER — CLINICAL SUPPORT (OUTPATIENT)
Dept: OBGYN CLINIC | Facility: CLINIC | Age: 49
End: 2024-01-08

## 2024-01-08 VITALS
HEART RATE: 91 BPM | WEIGHT: 196.6 LBS | SYSTOLIC BLOOD PRESSURE: 118 MMHG | DIASTOLIC BLOOD PRESSURE: 81 MMHG | HEIGHT: 65 IN | BODY MASS INDEX: 32.76 KG/M2

## 2024-01-08 DIAGNOSIS — Z30.09 UNWANTED FERTILITY: Primary | ICD-10-CM

## 2024-01-08 PROCEDURE — 96372 THER/PROPH/DIAG INJ SC/IM: CPT

## 2024-01-08 RX ADMIN — MEDROXYPROGESTERONE ACETATE 150 MG: 150 INJECTION, SUSPENSION INTRAMUSCULAR at 15:03

## 2024-01-19 DIAGNOSIS — F41.1 GENERALIZED ANXIETY DISORDER WITH PANIC ATTACKS: ICD-10-CM

## 2024-01-19 DIAGNOSIS — F41.0 GENERALIZED ANXIETY DISORDER WITH PANIC ATTACKS: ICD-10-CM

## 2024-01-19 RX ORDER — ALPRAZOLAM 1 MG/1
TABLET ORAL
Qty: 75 TABLET | Refills: 2 | Status: SHIPPED | OUTPATIENT
Start: 2024-01-19

## 2024-01-19 NOTE — TELEPHONE ENCOUNTER
Medication Refill Request     Name of Medication Xanax 1 mg tablet  Dose/Frequency 1 mg tablet/take 2 mg afternoon and 0.5 mg each evening  Quantity 1 mg tablet  Verified pharmacy   [x]  Verified ordering Provider   [x]  Does patient have enough for the next 3 days? Yes [x] No []  Does patient have a follow-up appointment scheduled? Yes [x] No []   If so when is appointment: 2/26

## 2024-01-19 NOTE — TELEPHONE ENCOUNTER
PDMP website reviewed. Shereen has been appropriately adherent to controlled psychotropic medications without evidence of abuse or misuse. As such, will send 30-day refill to pharmacy of choice and follow up as necessary.

## 2024-02-21 PROBLEM — N30.01 ACUTE CYSTITIS WITH HEMATURIA: Status: RESOLVED | Noted: 2018-10-15 | Resolved: 2024-02-21

## 2024-02-21 PROBLEM — Z01.419 ENCOUNTER FOR ANNUAL ROUTINE GYNECOLOGICAL EXAMINATION: Status: RESOLVED | Noted: 2018-02-20 | Resolved: 2024-02-21

## 2024-02-26 ENCOUNTER — TELEMEDICINE (OUTPATIENT)
Dept: PSYCHIATRY | Facility: CLINIC | Age: 49
End: 2024-02-26
Payer: COMMERCIAL

## 2024-02-26 DIAGNOSIS — F41.0 GENERALIZED ANXIETY DISORDER WITH PANIC ATTACKS: Primary | ICD-10-CM

## 2024-02-26 DIAGNOSIS — F51.04 PSYCHOPHYSIOLOGICAL INSOMNIA: ICD-10-CM

## 2024-02-26 DIAGNOSIS — F41.1 GENERALIZED ANXIETY DISORDER WITH PANIC ATTACKS: Primary | ICD-10-CM

## 2024-02-26 PROCEDURE — 99214 OFFICE O/P EST MOD 30 MIN: CPT | Performed by: STUDENT IN AN ORGANIZED HEALTH CARE EDUCATION/TRAINING PROGRAM

## 2024-02-26 RX ORDER — HYDROXYZINE PAMOATE 50 MG/1
50 CAPSULE ORAL
Qty: 30 CAPSULE | Refills: 1 | Status: SHIPPED | OUTPATIENT
Start: 2024-02-26

## 2024-02-26 NOTE — BH TREATMENT PLAN
TREATMENT PLAN (Medication Management Only)        [facility]      Name and Date of Birth:  Shereen Paris 49 y.o. 1975 MRN: [id number]    Date of Visit: February 26, 2024    Diagnosis/Diagnoses:    1. Generalized anxiety disorder with panic attacks        2. Psychophysiological insomnia  hydrOXYzine pamoate (VISTARIL) 50 mg capsule          Strengths/Personal Resources for Self-Care: supportive family, supportive friends, taking medications as prescribed, ability to adapt to life changes, ability to communicate needs, ability to communicate well, ability to listen, ability to reason, ability to understand psychiatric illness, ability to negotiate basic needs, being resoureceful, self-reliance, sense of humor, willingness to work on problems.    Area/Areas of need (in own words): sleep problems  1. Long Term Goal: improve control ofsleep.  Target Date:6 months - 8/26/2024  Person/Persons responsible for completion of goal: Shereen  2.  Short Term Objective (s) - How will we reach this goal?:   A. Provider new recommended medication/dosage changes and/or continue medication(s): continue current medications as prescribed.  B. Keep regularly scheduled psychiatric appointments  C. Maintain adherence to psychotropic medication regimen   D. Maintain sobriety   E. Maintain appropriate dietary intake  F. Practice adequate sleep hygiene    G. Continue to spend quality time with grandchild and daughter    Target Date:6 months - 8/26/2024  Person/Persons Responsible for Completion of Goal: Shereen    Progress Towards Goals: continuing treatment    Treatment Modality: medication management every 3 months    Review due 180 days from date of this plan: 6 months - 8/26/2024  Expected length of service: ongoing treatment    My Physician/PA/NP and I have developed this plan together and I agree to work on the goals and objectives. I understand the treatment goals that were  developed for my treatment. Treatment Plan was completed with Shereen's assistance and input throughout today's session. Shereen consented to the information provided and documented above. Unfortunately, treatment plan was not signed at the time of this office visit secondary to issues related to signature keypad.

## 2024-02-26 NOTE — PSYCH
MEDICATION MANAGEMENT NOTE        [facility]      Name and Date of Birth:  Shereen Paris 49 y.o. 1975 MRN: [id number]    Date of Visit: February 26, 2024    Reason for Visit: Follow-up visit for medication management     Virtual Visit Disclaimer:       TeleMed provider: Tu Hardy MD.   Location: [place]     Verification of patient location:     Patient is currently located in the [facility]  Patient is currently located in a state in which I am licensed     After connecting through [employer], the patient was identified by name and date of birth.  Shereen Paris was informed that this is a telemedicine visit that is being conducted through [employer], and the patient was informed that this is a secure, HIPAA-compliant platform. My office door was closed. No one else was in the room. Shereen Paris acknowledged consent and understanding of privacy and security of the video platform. Shereen understands that the online visit is based solely on information provided by the patient, and that, in the absence of a face-to-face physical evaluation by the physician, the diagnosis Shereen  receives is both limited and provisional in terms of accuracy and completeness. Shereen Paris understands that they can discontinue the visit at any time. I informed Shereen that I have reviewed their record in EPIC and presented the opportunity for them to ask any questions regarding the visit today. Shereen Paris voiced understanding and consented to these terms. Shereen is aware this is a billable service. Shereen is present at home.      SUBJECTIVE:    Shereen Paris is a 49 y.o. female with past psychiatric history significant for generalized anxiety disorder with panic attacks, opiate dependence (Rx Zubsolv via [facility]), insomnia, and extensive history of polysubstance abuse (cannabis, hallucinogens, cocaine) who was personally seen and evaluated today at the [facility]  [facility] outpatient clinic for follow-up and medication management. Shereen presents as calm, pleasant, and cooperative. Her thoughts are linear and organized. She completes assessment without difficulty.     Shereen endorses compliance with psychotropic medication regimen consisting of Xanax. She denies adverse medication side effects. PDMP website reviewed, no acute concerns for abuse or misuse. Shereen reports psychiatric stability since last visit. She shares today that she is actively battling COVID-19 and thus, her energy and motivation are low. She reports headache and sinus pressure. No problematic SOB or any other physical concerns. Supportive therapy provided. Acutely, Shereen's chief complaint at the moment is insomnia. She was previously using Benadryl but reports fear regarding \"getting dementia\" with Benadryl and thus, has stopped. She was encouraged to utilize OTC Melatonin. Additionally, I spoke with Shereen about potential benefits and use of Hydroxyzine, as this agent as less cholinergic burden. She was receptive and will start using Hydroxyzine on PRN basis if Melatonin is not effective. Acutely, her appetite is stable. She is set to meet with her dentist this week regarding denture placement. She speaks to ways this will improve her confidence. Shereen denies SI/HI. She continues to find pleasure in spending time with her daughter and grandaughter. We processed ways her sobriety has resulted in an improved relationship with daughter. She denies anhedonia, crying spells, or feelings of hopelessness. She has a greater sense of purpose and meaning in her life now. Shereen reports periods of heightened anxiety which is subjectively \"normal\". She is not tense or on-edge today. She currently rates her state of MH as \"7/10\" (10 being best she ever felt, 0 being worst). Shereen is pleased to share that she is attempting to eat healthier and has \"cut out sugar\". She is also spending more time caring " "for cats. During today's examination, Shereen does not exhibit objective evidence of j carlos/hypomania or psychosis. Shereen is not currently irritable, grandiose, labile, or pathologically euphoric. Shereen is without perceptual disturbances, such as A/V hallucinations, paranoia, ideas of reference, or delusional beliefs. Shereen denies recent ETOH or illicit substance abuse. Shereen is in the process of transition from oral Zubzolv to the injection, which will allow for greater life flexibility. Shereen offers no further concerns.       Current Rating Scores:     None completed today.    Review Of Systems:      Constitutional fever, feeling poorly, feeling tired, low energy, and as noted in HPI   ENT nasal congestion   Cardiovascular negative   Respiratory negative   Gastrointestinal negative   Genitourinary negative   Musculoskeletal negative   Integumentary negative   Neurological headache   Endocrine negative   Other Symptoms none, all other systems are negative       Past Psychiatric History: (unchanged information from previous note copied and italicized) - Information that is bolded has been updated.      Inpatient psychiatric admissions: Numerous past psychiatric admissions - last in 2017 at [facility] for depression, SI in context of drug use  Prior outpatient psychiatric linkage: Numerous psychiatrists in the past - Last linkage with CRNP via [facility]  Past/current psychotherapy: History of psychotherapy linkage, no current therapists  History of suicidal attempts/gestures: Numerous attempts in the past including overdose and \"cutting wrists\"  History of violence/aggressive behaviors: Denies  Psychotropic medication trials: Lexapro, Prozac, Zoloft, Paxil, Remeron, Trazodone, Wellbutrin, BuSpar, VPA, Lithium, Risperdal, Seroquel, Gabapentin, Propranolol, Clonidine, Valium, Xanax, Klonopin, Adderall, Ativan, Librium, Doxepin, Ambien, Provigil, Pristiq (too much GI discomfort)    Substance abuse " inpatient/outpatient rehabilitation: 10+ rehab/detox admissions - first at the age of 18 in [place] - last in 2017 at [facility]     Substance Abuse History: (unchanged information from previous note copied and italicized) - Information that is bolded has been updated.     Extensive history of illict substance (cocaine, methamphetamine, cannabis, opiates, hallucinogen), and tobacco abuse. She denies a history of ETOH abuse. No past legal actions yet 1 prior arrest secondary to substance intoxication. The patient denies prior DWIs/DUIs. Extensive history of outpatient/inpatient rehabilitation programs. Shereen does not exhibit objective evidence of substance withdrawal during today's examination nor does Shereen appear under the influence of any psychoactive substance.          Social History: (unchanged information from previous note copied and italicized) - Information that is bolded has been updated.      Developmental: Denies a history of milestone/developmental delay. Denies a history of in-utero exposure to toxins/illicit substances. There is no documented history of IEP or need for special education.  Education: college graduate - [employer]  Marital history:   Living arrangement, social support: , has 2 children (daughter and son), daughter has child (so Shereen is grandmother now)  Occupational History: on permanent disability  Access to firearms: Denies direct access to weapons/firearms. Shereen Paris has no history of arrests or violence with a deadly weapon.      Traumatic History: (unchanged information from previous note copied and italicized) - Information that is bolded has been updated.      Abuse:none is reported  Other Traumatic Events: Father dying in 2017 was problematic     Past Medical History:    Past Medical History:   Diagnosis Date    Addiction to drug (HCC)     Anxiety     Arthritis     Depression     Endometriosis     no current issues    Infectious viral  hepatitis     history of hep c    Irritable bowel syndrome     Substance abuse (HCC)         Past Surgical History:   Procedure Laterality Date    ALVEOPLASTY N/A 10/9/2023    Procedure: BILATERAL LOWER LINGUAL SACHIN REDUCTION, ALVEOPLASTY ALL FOUR QUADRANTS, UPPER LEFT/ UPPER RIGHT, LOWER LEFT/ LOWER RIGHT;  Surgeon: Michael Awadallah;  Location: [facility];  Service: Maxillofacial    APPENDECTOMY      CHOLECYSTECTOMY      HEMORRHOID SURGERY       Allergies   Allergen Reactions    Ciprofloxacin Anaphylaxis     Interacts with Zubsolv    Codeine Lightheadedness     Reaction Date: 2011;     Morphine Lightheadedness     Reaction Date: 2011;     Sulfa Antibiotics Anaphylaxis and Rash    Tramadol Lightheadedness    Bactrim [Sulfamethoxazole-Trimethoprim] Hives       Substance Abuse History:    Social History     Substance and Sexual Activity   Alcohol Use Not Currently    Comment: rarely/holidays     Social History     Substance and Sexual Activity   Drug Use Not Currently    Types: Heroin, Cocaine       Social History:    Social History     Socioeconomic History    Marital status: /Civil Union     Spouse name: Lucius    Number of children: 2    Years of education: Not on file    Highest education level: Not on file   Occupational History    Occupation: homemaker   Tobacco Use    Smoking status: Former     Current packs/day: 0.00     Types: Cigarettes     Quit date: 3/14/2019     Years since quittin.9     Passive exposure: Past    Smokeless tobacco: Never   Vaping Use    Vaping status: Never Used   Substance and Sexual Activity    Alcohol use: Not Currently     Comment: rarely/holidays    Drug use: Not Currently     Types: Heroin, Cocaine    Sexual activity: Yes     Partners: Male     Birth control/protection: Injection   Other Topics Concern    Not on file   Social History Narrative    Not on file     Social Determinants of Health     Financial Resource Strain: Low Risk  (2024)    Overall Financial  Resource Strain (CARDIA)     Difficulty of Paying Living Expenses: Not hard at all   Food Insecurity: No Food Insecurity (1/8/2024)    Hunger Vital Sign     Worried About Running Out of Food in the Last Year: Never true     Ran Out of Food in the Last Year: Never true   Transportation Needs: No Transportation Needs (1/8/2024)    PRAPARE - Transportation     Lack of Transportation (Medical): No     Lack of Transportation (Non-Medical): No   Physical Activity: Insufficiently Active (9/19/2019)    Exercise Vital Sign     Days of Exercise per Week: 3 days     Minutes of Exercise per Session: 30 min   Stress: No Stress Concern Present (3/22/2022)    [employer] - Occupational Stress Questionnaire     Feeling of Stress : Only a little   Social Connections: Unknown (9/19/2019)    Social Connection and Isolation Panel [NHANES]     Frequency of Communication with Friends and Family: Patient declined     Frequency of Social Gatherings with Friends and Family: Patient declined     Attends Synagogue Services: Patient declined     Active Member of Clubs or Organizations: Patient declined     Attends Club or Organization Meetings: Patient declined     Marital Status: Patient declined   Intimate Partner Violence: Not At Risk (9/19/2019)    Humiliation, Afraid, Rape, and Kick questionnaire     Fear of Current or Ex-Partner: No     Emotionally Abused: No     Physically Abused: No     Sexually Abused: No   Housing Stability: Low Risk  (3/22/2022)    Housing Stability Vital Sign     Unable to Pay for Housing in the Last Year: No     Number of Places Lived in the Last Year: 1     Unstable Housing in the Last Year: No       Family Psychiatric History:     Family History   Problem Relation Age of Onset    Cancer Father     Drug abuse Father     Drug abuse Mother     No Known Problems Sister     No Known Problems Daughter     No Known Problems Maternal Grandmother     Breast cancer Maternal Grandfather 80     Prostate cancer Maternal Grandfather     Melanoma Maternal Grandfather     Colon cancer Maternal Grandfather 70    No Known Problems Paternal Grandmother     No Known Problems Paternal Grandfather     No Known Problems Daughter     No Known Problems Maternal Aunt        History Review: The following portions of the patient's history were reviewed and updated as appropriate: allergies, current medications, past family history, past medical history, past social history, past surgical history, and problem list.         OBJECTIVE:     Vital signs in last 24 hours:    There were no vitals filed for this visit.    Mental Status Evaluation:    Appearance age appropriate, casually dressed, dressed appropriately, looks stated age, piercings, tattooed   Behavior cooperative, mildly anxious, good eye contact   Speech normal rate, normal volume, normal pitch   Mood anxious   Affect normal range and intensity, appropriate   Thought Processes organized, logical, goal directed   Associations intact associations   Thought Content no overt delusions   Perceptual Disturbances: no auditory hallucinations, no visual hallucinations   Abnormal Thoughts  Risk Potential Suicidal ideation - None at present  Homicidal ideation - None at present  Potential for aggression - No   Orientation oriented to person, place, time/date, and situation   Memory recent and remote memory grossly intact   Consciousness alert and awake   Attention Span Concentration Span attention span and concentration are age appropriate   Intellect appears to be of average intelligence   Insight intact and good   Judgement intact and good   Muscle Strength and  Gait unable to assess today due to virtual visit   Motor activity no abnormal movements   Language no difficulty naming common objects   Fund of Knowledge adequate knowledge of current events   Pain mild   Pain Scale Did not ask patient to formally rate       Laboratory Results: I have personally reviewed all  pertinent laboratory/tests results    Recent Labs (last 2 months):   No visits with results within 2 Month(s) from this visit.   Latest known visit with results is:   Admission on 10/09/2023, Discharged on 10/09/2023   Component Date Value    EXT Preg Test, Ur 10/09/2023 Negative     Control 10/09/2023 Valid        Suicide/Homicide Risk Assessment:    The following interventions are recommended: no intervention changes needed      Lethality Statement:    Based on today's assessment and clinical criteria, Shereen Paris contracts for safety and is not an imminent risk of harm to self or others. Outpatient level of care is deemed appropriate at this current time. Shereen understands that if they can no longer contract for safety, they need to call the office or report to their nearest Emergency Room for immediate evaluation.      Assessment/Plan:     Shereen Paris is a 49 y.o. female with past psychiatric history significant for generalized anxiety disorder with panic attacks, opiate dependence (Rx Zubsolv via [facility]), insomnia, and extensive history of polysubstance abuse (cannabis, hallucinogens, cocaine) who was personally seen and evaluated today at the [facility] outpatient clinic for follow-up and medication management. Shereen endorses a longstanding history of anxiety and episodic depression that served as the catalyst for her substance abuse. She states that her biological parents abused illicit substances and thus, they were emotionally neglectful and inconsistent. At the age of 14, Shereen began using cannabis which ultimately lead to use of numerous illicit substances. Shereen reports placement in 10+ detox/rehabilitation programs throughout the course of her life. Her first rehabilitation program was at age 18 in [place]. She last attended rehab via [facility] in 2017, following the death of her father and and subsequent inpatient admission at [facility]. She endorses  "sobriety from opiates and other illicit substances for the last 3+ years. She was recently psychiatrically managed via CRNP Caprice Kenny through [facility]. However, given their change in policy regarding concurrent use of opiates and benzodiazepines, Shereen was transferred to [facility] for safer, more appropriate tapering process. Shereen has been treated with benzodiazepines since her early 20's. She was previously on Xanax 6mg Daily (2mg TID) but was recently tapered to 4mg Daily (2mg AM, 1mg afternoon, 1mg QHS) over the last 8 months. PDMP webiste reviewed, no concerns for abuse or misuse. I spoke at length with Shereen about 's policy regarding concurrent use of opiates and benzodiazepines and risks/alternatives to treatment. She voiced understanding regarding the need to taper off Xanax and was agreeable to do so. I also discussed possibility of faster tapering process via detox program at [facility], to which she was resistant given responsibility of caring for her family. Shereen denies most neurovegetative symptomatology suggestive of major depressive disorder. Shereen adamantly denies acute thoughts of suicide or self-harm. Shereen has no plans to harm others. There is a documented history of prior suicidal gestures/suicidal attempts. Shereen admits to overdosing and \"cutting her wrists\" in the past. She has not harmed herself or attempted to end her life in 4+ years. Shereen endorses a historical struggle with anxiety and symptomatology consistent with generalized anxiety disorder. Shereen reports anxiety that is pathologic in nature. Shereen endorses excessive nervousness, irrational worry, and overt anxiousness. Shereen is not pervasively restless or tense but does report feeling episodically keyed-up and on-edge. Shereen experiences disruption in energy and concentration secondary to anxiety. There is evidence to suggest that Shereen experiences irritability, inability to relax, and disruption in sleep in the past, " "secondary to pathologic anxiety. Shereen denies new-onset panic symptomatology but does report a history of panic attacks characterized by tremor, SOB, fear of impending doom, and overt anxiousness. She denies maladaptive behaviors. Shereen vehemently denies any acute or chronic history suggestive of an underlying affective (bipolar) organization. Shereen denies historical symptomatology suggestive of an underlying psychotic process. Shereen denies historical symptomatology suggestive of PTSD, OCD, or disordered eating.         Today's Plan:     Psychopharmacologically, Shereen reports benefit and tolerability associated with Xanax. She denies need for medication change or optimization regarding this agent. Acutely, Shereen's chief complaint at the moment is insomnia. She was previously using Benadryl but reports fear regarding \"getting dementia\" with Benadryl and thus, has stopped. She was encouraged to utilize OTC Melatonin. Additionally, I spoke with Shereen about potential benefits and use of Hydroxyzine, as this agent as less cholinergic burden. She was receptive and will start using Hydroxyzine on PRN basis if Melatonin is not effective. Risks/benefits/alternativies to treatment discussed, including a myriad of potential adverse medication side effects, to which Shereen voiced understanding and consented fully to treatment.        DSM-V Diagnoses:      1.) Generalized Anxiety Disorder with Panic Attacks   2.) Opiate dependence, continuous  3.) Polysubstance abuse by history   4.) Insomnia        Treatment Recommendations/Precautions:     1.) Generalized Anxiety Disorder with Panic Attacks   - Continue Xanax 2mg afternoon, 0.5mg QHS (total of 0.5mg reduction) - tapering process in effect (was previously on high doses)              - I inherited Shereen on high-dose Xanax - plan has been to ultimately taper off Xanax given concurrent use of Suboxone, however, Shereen has been on BZ for 20+ years so tapering process will be arduous "      2.) Opiate dependence, continuous  - Rx [facility]solv 8.[facility]mg [facility]       3.) Polysubstance abuse by history   - Currently sober - 6.5 years (anniversary is September)  - Counseled to avoid ETOH, illict substances, and nicotine secondary to the detrimental effects of these substances on mental and physical health     4.) Insomnia  - Start PRN Hydroxyzine 50mg QHS      Medication management every 4 months  Aware of need to follow up with family physician for medical issues  Aware of 24 hour and weekend coverage for urgent situations accessed by calling [facility] main practice number    Medications Risks/Benefits      Risks, Benefits And Possible Side Effects Of Medications:    Risks, benefits, and possible side effects of medications explained to Shereen including risks of misuse, abuse or dependence, sedation and respiratory depression related to treatment with benzodiazepine medications. She verbalizes understanding and agreement for treatment.    Controlled Medication Discussion:     Shereen has been filling controlled prescriptions on time as prescribed according to [place] Prescription Drug Monitoring Program  Discussed with Shereen the risks of sedation, respiratory depression, impairment of ability to drive and potential for abuse and addiction related to treatment with benzodiazepine medications. She understands risk of treatment with benzodiazepine medications, agrees to not drive if feels impaired and agrees to take medications as prescribed  Discussed with Shereen Black Box warning on concurrent use of benzodiazepines and opioid medications including sedation, respiratory depression, coma and death. She understands the risk of treatment with benzodiazepines in addition to opioids and wants to continue taking those medications    Psychotherapy Provided:     Individual psychotherapy provided: Yes  Counseling was provided during the session today for 16 minutes.  Medications,  treatment progress and treatment plan reviewed with Shereen.  Medication changes discussed with Shereen.  Medication education provided to Shereen.  Coping strategies reviewed with Shereen.   Educated on importance of medication and treatment compliance.  Supportive therapy provided.      Treatment Plan:    Completed and signed during the session: Yes - Treatment Plan done but not signed at time of office visit due to:  Plan reviewed by video and verbal consent given due to virtual visit.      Visit Time    Visit Start Time: 4:00 PM  Visit Stop Time: 4:24 PM  Total Visit Duration:  24 minutes     The total visit duration detailed above includes: patient engagement, medication management, psychotherapy/counseling, discussion regarding treatment goals, documentation, review of past medical records, and coordination of care.      Note Share Disclaimer:     This note was not shared with the patient due to patient requested      Tu Hardy MD  Board Certified Diplomate of the American Board of Psychiatry and Neurology  02/26/24

## 2024-02-29 ENCOUNTER — TELEPHONE (OUTPATIENT)
Dept: PSYCHIATRY | Facility: CLINIC | Age: 49
End: 2024-02-29

## 2024-02-29 NOTE — TELEPHONE ENCOUNTER
Left voicemail informing patient of the Psych Encounter form needing to be signed as a requirement from the insurance company for billing purposes. Patient can access form via [employer] and sign electronically.     Please make patient aware this form must be signed for each visit as a requirement to continue future visits with provider.

## 2024-04-03 ENCOUNTER — CLINICAL SUPPORT (OUTPATIENT)
Dept: OBGYN CLINIC | Facility: CLINIC | Age: 49
End: 2024-04-03

## 2024-04-03 VITALS
WEIGHT: 186.6 LBS | DIASTOLIC BLOOD PRESSURE: 78 MMHG | SYSTOLIC BLOOD PRESSURE: 127 MMHG | HEART RATE: 98 BPM | BODY MASS INDEX: 31.09 KG/M2 | HEIGHT: 65 IN

## 2024-04-03 DIAGNOSIS — Z30.09 UNWANTED FERTILITY: Primary | ICD-10-CM

## 2024-04-03 PROCEDURE — 96372 THER/PROPH/DIAG INJ SC/IM: CPT

## 2024-04-03 RX ADMIN — MEDROXYPROGESTERONE ACETATE 150 MG: 150 INJECTION, SUSPENSION INTRAMUSCULAR at 10:45

## 2024-04-12 ENCOUNTER — TELEPHONE (OUTPATIENT)
Dept: PSYCHIATRY | Facility: CLINIC | Age: 49
End: 2024-04-12

## 2024-04-12 DIAGNOSIS — F41.1 GENERALIZED ANXIETY DISORDER WITH PANIC ATTACKS: ICD-10-CM

## 2024-04-12 DIAGNOSIS — F41.0 GENERALIZED ANXIETY DISORDER WITH PANIC ATTACKS: ICD-10-CM

## 2024-04-12 RX ORDER — ALPRAZOLAM 1 MG/1
TABLET ORAL
Qty: 75 TABLET | Refills: 2 | Status: SHIPPED | OUTPATIENT
Start: 2024-04-12

## 2024-04-12 NOTE — TELEPHONE ENCOUNTER
Medication Refill Request     Name of Medication xanax  Dose/Frequency 1mg take 2mg each afternoon and 0.5mg each evening  Quantity 75  Verified pharmacy   [x]  Verified ordering Provider   [x]  Does patient have enough for the next 3 days? Yes [] No [x]  Does patient have a follow-up appointment scheduled? Yes [x] No []   If so when is appointment: 7/22/2024 12:30pm

## 2024-04-12 NOTE — TELEPHONE ENCOUNTER
PDMP website reviewed. Shereen has been appropriately adherent to controlled psychotropic medications without evidence of abuse or misuse. As such, will send 30-day refill to pharmacy of choice and follow up as necessary.PDMP website reviewed. Shereen has been appropriately adherent to controlled psychotropic medications without evidence of abuse or misuse. As such, will send 30-day refill to pharmacy of choice and follow up as necessary.

## 2024-04-12 NOTE — TELEPHONE ENCOUNTER
Patient contacted the office to schedule a follow up visit with provider. Patient is now scheduled for 7/22/2024   at 12:30pm in office.

## 2024-05-02 ENCOUNTER — OFFICE VISIT (OUTPATIENT)
Dept: FAMILY MEDICINE CLINIC | Facility: CLINIC | Age: 49
End: 2024-05-02
Payer: MEDICARE

## 2024-05-02 VITALS
RESPIRATION RATE: 14 BRPM | SYSTOLIC BLOOD PRESSURE: 132 MMHG | HEART RATE: 95 BPM | DIASTOLIC BLOOD PRESSURE: 84 MMHG | WEIGHT: 185.6 LBS | BODY MASS INDEX: 30.92 KG/M2 | HEIGHT: 65 IN | TEMPERATURE: 98.3 F | OXYGEN SATURATION: 98 %

## 2024-05-02 DIAGNOSIS — Z12.31 ENCOUNTER FOR SCREENING MAMMOGRAM FOR BREAST CANCER: ICD-10-CM

## 2024-05-02 DIAGNOSIS — M54.16 LUMBAR RADICULOPATHY: Primary | ICD-10-CM

## 2024-05-02 DIAGNOSIS — R94.131 ABNORMAL EMG: ICD-10-CM

## 2024-05-02 DIAGNOSIS — Z12.4 SCREENING FOR CERVICAL CANCER: ICD-10-CM

## 2024-05-02 PROCEDURE — 99214 OFFICE O/P EST MOD 30 MIN: CPT | Performed by: INTERNAL MEDICINE

## 2024-05-02 RX ORDER — GABAPENTIN 100 MG/1
100 CAPSULE ORAL 2 TIMES DAILY
Qty: 60 CAPSULE | Refills: 3 | Status: SHIPPED | OUTPATIENT
Start: 2024-05-02

## 2024-05-02 NOTE — PROGRESS NOTES
Name: Shereen Paris      : 1975      MRN: [id number]  Encounter Provider: Maxwell Calloway MD  Encounter Date: 2024   Encounter department: [facility] CARE [facility]    Assessment & Plan     1. Lumbar radiculopathy  -     Ferritin; Future  -     Ambulatory referral to Spine & Pain Management; Future  -     gabapentin (Neurontin) 100 mg capsule; Take 1 capsule (100 mg total) by mouth 2 (two) times a day    2. Screening for cervical cancer  -     Ambulatory referral to Obstetrics / Gynecology; Future    3. Encounter for screening mammogram for breast cancer  -     Mammo screening bilateral w 3d & cad; Future    4. Abnormal EMG  -     Ferritin; Future  -     Ambulatory referral to Spine & Pain Management; Future  -     gabapentin (Neurontin) 100 mg capsule; Take 1 capsule (100 mg total) by mouth 2 (two) times a day    5. BMI 30.0-30.9,adult  Comments:  life style Mod  RTC in 3 mos w Blood work  Orders:  -     Ferritin; Future  -     UA (URINE) with reflex to Scope; Future  -     TSH, 3rd generation; Future; Expected date: 2024  -     T4, free; Future; Expected date: 2024  -     Thyroid Antibodies Panel; Future  -     Magnesium; Future  -     Lipid panel; Future; Expected date: 2024  -     CBC and differential; Future; Expected date: 2024  -     Comprehensive metabolic panel; Future; Expected date: 2024    RTC in 3 mos w Blood work       Subjective      49 Y O lady is here for Regular check Up, and Increasing Lumbar radiculopathy, radiating to left lower ext., med list reviewed,...      Review of Systems   Constitutional:  Negative for chills, fatigue and fever.   HENT:  Positive for postnasal drip. Negative for congestion, facial swelling, sore throat, trouble swallowing and voice change.    Eyes:  Negative for pain, discharge and visual disturbance.   Respiratory:  Negative for cough, shortness of breath and wheezing.    Cardiovascular:  Negative for  "chest pain, palpitations and leg swelling.   Gastrointestinal:  Negative for abdominal pain, blood in stool, constipation, diarrhea and nausea.   Endocrine: Negative for polydipsia, polyphagia and polyuria.   Genitourinary:  Negative for difficulty urinating, hematuria and urgency.   Musculoskeletal:  Positive for back pain. Negative for arthralgias and myalgias.   Skin:  Negative for rash.   Neurological:  Positive for numbness. Negative for dizziness, tremors, weakness and headaches.   Hematological:  Negative for adenopathy. Does not bruise/bleed easily.   Psychiatric/Behavioral:  Negative for dysphoric mood, sleep disturbance and suicidal ideas.        Current Outpatient Medications on File Prior to Visit   Medication Sig    ALPRAZolam (XANAX) 1 mg tablet Take 2mg each afternoon and 0.5mg each evening.    Buprenorphine HCl-Naloxone HCl 8.6-2.1 MG SUBL     diphenhydrAMINE (BENADRYL) 25 mg capsule Take 25 mg by mouth every 6 (six) hours as needed for itching    hydrOXYzine pamoate (VISTARIL) 50 mg capsule Take 1 capsule (50 mg total) by mouth daily at bedtime as needed (Sleep)    medroxyPROGESTERone (DEPO-PROVERA) 150 mg/mL injection Inject 1 mL (150 mg total) into a muscle every 3 (three) months    [DISCONTINUED] chlorhexidine (PERIDEX) 0.12 % solution Apply 15 mL to the mouth or throat 2 (two) times a day (Patient not taking: Reported on 12/6/2023)    [DISCONTINUED] ibuprofen (MOTRIN) 600 mg tablet Take 1 tablet (600 mg total) by mouth every 6 (six) hours as needed for mild pain for up to 10 days       Objective     /84 (BP Location: Left arm, Patient Position: Sitting, Cuff Size: Standard)   Pulse 95   Temp 98.3 °F (36.8 °C) (Tympanic)   Resp 14   Ht 5' 5\" (1.651 m)   Wt 84.2 kg (185 lb 9.6 oz)   LMP  (LMP Unknown)   SpO2 98%   BMI 30.89 kg/m²     Physical Exam  Constitutional:       General: She is not in acute distress.  HENT:      Head: Normocephalic.      Mouth/Throat:      Pharynx: No " oropharyngeal exudate.   Eyes:      General: No scleral icterus.     Conjunctiva/sclera: Conjunctivae normal.      Pupils: Pupils are equal, round, and reactive to light.   Neck:      Thyroid: No thyromegaly.   Cardiovascular:      Rate and Rhythm: Normal rate and regular rhythm.      Heart sounds: Normal heart sounds. No murmur heard.  Pulmonary:      Effort: Pulmonary effort is normal. No respiratory distress.      Breath sounds: Normal breath sounds. No wheezing or rales.   Abdominal:      General: Bowel sounds are normal. There is no distension.      Palpations: Abdomen is soft.      Tenderness: There is no abdominal tenderness. There is no guarding or rebound.   Musculoskeletal:         General: Tenderness present.      Cervical back: Neck supple.   Lymphadenopathy:      Cervical: No cervical adenopathy.   Skin:     Coloration: Skin is not pale.      Findings: No rash.   Neurological:      Mental Status: She is alert and oriented to person, place, and time.      Sensory: Sensory deficit present.      Motor: No weakness.       Maxwell Calloway MD

## 2024-05-20 ENCOUNTER — CONSULT (OUTPATIENT)
Dept: PAIN MEDICINE | Facility: CLINIC | Age: 49
End: 2024-05-20
Payer: MEDICARE

## 2024-05-20 VITALS
DIASTOLIC BLOOD PRESSURE: 80 MMHG | HEIGHT: 65 IN | SYSTOLIC BLOOD PRESSURE: 132 MMHG | BODY MASS INDEX: 30.82 KG/M2 | WEIGHT: 185 LBS

## 2024-05-20 DIAGNOSIS — M47.817 LUMBOSACRAL SPONDYLOSIS WITHOUT MYELOPATHY: ICD-10-CM

## 2024-05-20 DIAGNOSIS — M54.16 LUMBAR RADICULOPATHY: Primary | ICD-10-CM

## 2024-05-20 PROCEDURE — 99204 OFFICE O/P NEW MOD 45 MIN: CPT | Performed by: ANESTHESIOLOGY

## 2024-05-20 NOTE — PROGRESS NOTES
Assessment  1. Lumbar radiculopathy    2. Lumbosacral spondylosis without myelopathy      Patient presenting with chronic left radiating leg pain for greater than 1 year, worsening over the past several months.  Pain is consistent with lumbar radicular pain accompanied by pain >7/10 on the pain scale with inability to participate in IADLs for >6 weeks.  Numerous medication classes as listed below patient has tried with limited benefit. Denies any bowel or bladder incontinence, saddle anesthesia.    In regards to the patient's pathology, we discussed the various treatment options including physical therapy, chiropractic treatment, medication management, activity modifications, interventional spine procedures.      Independently reviewed and interpreted lumbar MRI from 2022-this showed facet hypertrophy at L3-4, L4-5 and L5-S1 with moderate right and mild left foraminal stenosis at L3-4 and mild left foraminal narrowing at L4-5.    Patient also obtain an EMG previously which was suggestive of subacute to chronic left L4-S1 polyradiculopathy with denervation in the paraspinals.    Plan    - Discussed and offered left L4 and L5 TFESI for chronic lumbar radicular pain symptoms ongoing for greater than 1 year with pain consistently at 7 out of 10 levels.  Risks, benefits, and alternatives to epidural steroid injections thoroughly discussed with patient.   Complete risks and benefits including bleeding, infection, tissue reaction, nerve injury and allergic reaction were discussed. The approach was demonstrated using models and literature was provided. Verbal consent was obtained.    - Referral placed for physical therapy    RTC about 4 weeks after TFESIs    Reviewed external notes from from neurosurgery (1/17/2023), family medicine (5/2/2024) in regards to recent and prior relevant medical histories, treatment recommendations, medication and/or interventional treatment responses.    Reviewed hemoglobin A1c, renal function,  CBC and/or PT/INR prior to discussing/offering interventional modalities.    [place] Prescription Drug Monitoring Program report was reviewed and was appropriate     My impressions and treatment recommendations were discussed in detail with the patient who verbalized understanding and had no further questions.  Discharge instructions were provided. I personally saw and examined the patient and I agree with the above discussed plan of care.    Orders Placed This Encounter   Procedures    FL spine and pain procedure     Standing Status:   Future     Standing Expiration Date:   5/20/2028     Order Specific Question:   Reason for Exam:     Answer:   left L4 and L5 TF MONCHO     Order Specific Question:   Is the patient pregnant?     Answer:   No     Order Specific Question:   Anticoagulant hold needed?     Answer:   no    Ambulatory referral to Physical Therapy     Standing Status:   Future     Standing Expiration Date:   5/20/2025     Referral Priority:   Routine     Referral Type:   Physical Therapy     Referral Reason:   Specialty Services Required     Requested Specialty:   Physical Therapy     Number of Visits Requested:   1     Expiration Date:   5/20/2025     No orders of the defined types were placed in this encounter.      History of Present Illness    Shereen Paris is a 49 y.o. female presenting for consultation (referred by Dr. Calloway) at [facility] for exam and evaluation of chronic left radiating leg pain for greater than 1 year, worsening over the past several months. Pain started without any precipitating injury or trauma. Over the past month, the intensity of pain has been Moderate to severe. Pain is currently 7/10. Pain does interfere with age appropriate activities of daily living. Pain is constant, with no typical pattern throughout the day. Pain is described as burning, cramping, shooting, sharp, pressure-like, throbbing, aching, cutting with numbness and  pins-and-needles. Patient endorses weakness in the lower extremities. Assistance device used: None.    Pain is increased with standing, bending, sitting, walking, exercise.   Pain is decreased with resting.    Treatments tried:   PT: no  Chiropractic therapy: no  Injections: no   Previous spine surgery: No    Anticoagulation: no    Medications tried:   Tylenol,  NSAIDs, Flexeril, baclofen, metaxalone, gabapentin, Topamax  Opioids in the past-currently on Suboxone    I have personally reviewed and/or updated the patient's past medical history, past surgical history, family history, social history, current medications, allergies, and vital signs today.     Review of Systems   Constitutional:  Negative for chills and fever.   HENT:  Negative for ear pain and sore throat.    Eyes:  Negative for pain and visual disturbance.   Respiratory:  Negative for cough and shortness of breath.    Cardiovascular:  Negative for chest pain and palpitations.   Gastrointestinal:  Negative for abdominal pain and vomiting.   Genitourinary:  Negative for dysuria and hematuria.   Musculoskeletal:  Positive for back pain, gait problem and myalgias. Negative for arthralgias.   Skin:  Negative for color change and rash.   Neurological:  Positive for weakness and numbness. Negative for seizures and syncope.   Psychiatric/Behavioral:  Positive for agitation.    All other systems reviewed and are negative.      Patient Active Problem List   Diagnosis    Bacterial vaginosis    Breast lump    Candidal vulvovaginitis    Esophagitis, reflux    Encounter for surveillance of injectable contraceptive    Encounter for screening mammogram for malignant neoplasm of breast    Generalized anxiety disorder with panic attacks    Psychophysiological insomnia    Lumbar radiculopathy    Abscess    Breast thickening    Common migraine without aura    Dental caries    Difficult or painful urination    Fatigue    Fibromyalgia    Functional diarrhea    Tinea corporis        Past Medical History:   Diagnosis Date    Addiction to drug (HCC)     Anxiety     Arthritis     Depression     Endometriosis     no current issues    Infectious viral hepatitis     history of hep c    Irritable bowel syndrome     Substance abuse (HCC)        Past Surgical History:   Procedure Laterality Date    ALVEOPLASTY N/A 10/9/2023    Procedure: BILATERAL LOWER LINGUAL SACHIN REDUCTION, ALVEOPLASTY ALL FOUR QUADRANTS, UPPER LEFT/ UPPER RIGHT, LOWER LEFT/ LOWER RIGHT;  Surgeon: Michael Awadallah;  Location: [facility];  Service: Maxillofacial    APPENDECTOMY      CHOLECYSTECTOMY      HEMORRHOID SURGERY         Family History   Problem Relation Age of Onset    Cancer Father     Drug abuse Father     Drug abuse Mother     No Known Problems Sister     No Known Problems Daughter     No Known Problems Maternal Grandmother     Breast cancer Maternal Grandfather 80    Prostate cancer Maternal Grandfather     Melanoma Maternal Grandfather     Colon cancer Maternal Grandfather 70    No Known Problems Paternal Grandmother     No Known Problems Paternal Grandfather     No Known Problems Daughter     No Known Problems Maternal Aunt        Social History     Occupational History    Occupation: homemaker   Tobacco Use    Smoking status: Former     Current packs/day: 0.00     Types: Cigarettes     Quit date: 3/14/2019     Years since quittin.1     Passive exposure: Past    Smokeless tobacco: Never   Vaping Use    Vaping status: Never Used   Substance and Sexual Activity    Alcohol use: Not Currently     Comment: rarely/holidays    Drug use: Not Currently     Types: Heroin, Cocaine    Sexual activity: Yes     Partners: Male     Birth control/protection: Injection       Current Outpatient Medications on File Prior to Visit   Medication Sig    ALPRAZolam (XANAX) 1 mg tablet Take 2mg each afternoon and 0.5mg each evening.    Buprenorphine HCl-Naloxone HCl 8.6-2.1 MG SUBL     gabapentin (Neurontin) 100 mg capsule Take 1  "capsule (100 mg total) by mouth 2 (two) times a day    hydrOXYzine pamoate (VISTARIL) 50 mg capsule Take 1 capsule (50 mg total) by mouth daily at bedtime as needed (Sleep)    medroxyPROGESTERone (DEPO-PROVERA) 150 mg/mL injection Inject 1 mL (150 mg total) into a muscle every 3 (three) months    diphenhydrAMINE (BENADRYL) 25 mg capsule Take 25 mg by mouth every 6 (six) hours as needed for itching     Current Facility-Administered Medications on File Prior to Visit   Medication    medroxyPROGESTERone acetate (DEPO-PROVERA SYRINGE) IM injection 150 mg       Allergies   Allergen Reactions    Ciprofloxacin Anaphylaxis     Interacts with Zubsolv    Codeine Lightheadedness     Reaction Date: 07Jun2011;     Morphine Lightheadedness     Reaction Date: 07Jun2011;     Sulfa Antibiotics Anaphylaxis and Rash    Tramadol Lightheadedness    Bactrim [Sulfamethoxazole-Trimethoprim] Hives       Physical Exam    /80   Ht 5' 5\" (1.651 m)   Wt 83.9 kg (185 lb)   BMI 30.79 kg/m²     Constitutional: normal, well developed, well nourished, alert, in no distress and non-toxic and no overt pain behavior.  Eyes: anicteric  HEENT: grossly intact  Neck: supple, symmetric, trachea midline and no masses   Pulmonary:even and unlabored  Cardiovascular:No edema or pitting edema present  Skin:Normal without rashes or lesions and well hydrated  Psychiatric:Mood and affect appropriate  Neurologic: Motor function is grossly intact with no focal neurologic deficits   Musculoskeletal: Gait is nonantalgic    Imaging  MRI lumbar spine 9/21/2022  FINDINGS:     VERTEBRAL BODIES:  There is a transitional lumbosacral junction.  There is a hypoplastic complete disc space at S1-S2.  Normal alignment of the lumbar spine.  No spondylolysis or spondylolisthesis. No scoliosis.  No compression fracture.    Normal marrow   signal is identified within the visualized bony structures.  No discrete marrow lesion.     SACRUM:  Normal signal within the sacrum. No " evidence of insufficiency or stress fracture.     DISTAL CORD AND CONUS:  Normal size and signal within the distal cord and conus.     PARASPINAL SOFT TISSUES:  Paraspinal soft tissues are unremarkable.     LOWER THORACIC DISC SPACES:  Normal disc height and signal.  No disc herniation, canal stenosis or foraminal narrowing.     LUMBAR DISC SPACES:     L1-L2:  Normal.     L2-L3:  Normal.     L3-L4:  Moderate facet hypertrophy.  Small marginal osteophyte with facet hypertrophy bilaterally result in moderate right and mild left foraminal narrowing.     L4-L5:  Moderate to severe facet hypertrophy.  Marginal osteophytes and facet hypertrophy combined result in mild left foraminal narrowing.     L5-S1:  Moderate facet hypertrophy.  No central or foraminal narrowing.     IMPRESSION:     Facet hypertrophy and small marginal osteophytes result in foraminal narrowing bilaterally at L3-4 and L4-5 most prominently on the right at L3-4.

## 2024-05-26 ENCOUNTER — OFFICE VISIT (OUTPATIENT)
Dept: URGENT CARE | Age: 49
End: 2024-05-26
Payer: MEDICARE

## 2024-05-26 VITALS
WEIGHT: 184 LBS | BODY MASS INDEX: 30.66 KG/M2 | OXYGEN SATURATION: 100 % | HEIGHT: 65 IN | TEMPERATURE: 98.6 F | DIASTOLIC BLOOD PRESSURE: 68 MMHG | SYSTOLIC BLOOD PRESSURE: 126 MMHG | RESPIRATION RATE: 18 BRPM | HEART RATE: 74 BPM

## 2024-05-26 DIAGNOSIS — N76.0 ACUTE VAGINITIS: ICD-10-CM

## 2024-05-26 DIAGNOSIS — B37.2 CANDIDIASIS, INTERTRIGO: Primary | ICD-10-CM

## 2024-05-26 PROCEDURE — 99213 OFFICE O/P EST LOW 20 MIN: CPT | Performed by: EMERGENCY MEDICINE

## 2024-05-26 RX ORDER — NYSTATIN 100000 U/G
CREAM TOPICAL 2 TIMES DAILY
Qty: 30 G | Refills: 0 | Status: SHIPPED | OUTPATIENT
Start: 2024-05-26

## 2024-05-26 RX ORDER — FLUCONAZOLE 150 MG/1
150 TABLET ORAL ONCE
Qty: 1 TABLET | Refills: 0 | Status: SHIPPED | OUTPATIENT
Start: 2024-05-26 | End: 2024-05-26

## 2024-05-26 NOTE — PROGRESS NOTES
"  [facility]        NAME: Shereen Paris is a 49 y.o. female  : 1975    MRN: [id number]  DATE: May 26, 2024  TIME: 11:58 AM    Assessment and Plan   Candidiasis, intertrigo [B37.2]  1. Candidiasis, intertrigo  nystatin (MYCOSTATIN) cream      2. Acute vaginitis  fluconazole (DIFLUCAN) 150 mg tablet            Patient Instructions     Start diflucan as prescribed  Start nystatin cream as prescribed  Avoid shaving hair at affected area  Change razor blades frequently  Keep area clean and dry  Watch for signs of worsening sx as discussed    If tests are performed, our office will contact you with results only if changes need to made to the care plan discussed with you at the visit. You can review your full results on [facility] My[facility].    Chief Complaint     Chief Complaint   Patient presents with    Rash     Patient complains of left armpit rash that started x3 days ago. Patient has not attempted to put anything on it.  Patient complains of burning and itching.  Patient also mentions she started with yeast infection last night. No other complaints.           History of Present Illness       Patient is a 48 yo female with no significant PMH presenting in the clinic today for rash x 3 days. Admits erythematous burning and pruritic rash located along the left axilla.  Patient also notes recent increase in vaginal discharge which she describes as \"white clumps \".  Patient notes history of recurrent vaginitis and candidal infections. Denies fever, chills, chest pain, SOB, purulent discharge, swelling, bruising, and warmth. Denies the use of OTX tx for symptom management.  Denies household members with a similar rash.  Denies recent changes in medications, diet, soaps, detergents, clothing, etc. Denies h/o DM.         Review of Systems   Review of Systems   Constitutional:  Negative for chills and fever.   Respiratory:  Negative for shortness of breath.    Cardiovascular:  Negative for chest pain. "   Genitourinary:  Positive for vaginal discharge. Negative for vaginal bleeding and vaginal pain.   Skin:  Positive for rash. Negative for wound.   Neurological:  Negative for numbness.         Current Medications       Current Outpatient Medications:     ALPRAZolam (XANAX) 1 mg tablet, Take 2mg each afternoon and 0.5mg each evening., Disp: 75 tablet, Rfl: 2    Buprenorphine HCl-Naloxone HCl 8.6-2.1 MG SUBL, , Disp: , Rfl:     fluconazole (DIFLUCAN) 150 mg tablet, Take 1 tablet (150 mg total) by mouth once for 1 dose, Disp: 1 tablet, Rfl: 0    gabapentin (Neurontin) 100 mg capsule, Take 1 capsule (100 mg total) by mouth 2 (two) times a day, Disp: 60 capsule, Rfl: 3    hydrOXYzine pamoate (VISTARIL) 50 mg capsule, Take 1 capsule (50 mg total) by mouth daily at bedtime as needed (Sleep), Disp: 30 capsule, Rfl: 1    medroxyPROGESTERone (DEPO-PROVERA) 150 mg/mL injection, Inject 1 mL (150 mg total) into a muscle every 3 (three) months, Disp: 1 mL, Rfl: 5    nystatin (MYCOSTATIN) cream, Apply topically 2 (two) times a day, Disp: 30 g, Rfl: 0    diphenhydrAMINE (BENADRYL) 25 mg capsule, Take 25 mg by mouth every 6 (six) hours as needed for itching, Disp: , Rfl:     Current Facility-Administered Medications:     medroxyPROGESTERone acetate (DEPO-PROVERA SYRINGE) IM injection 150 mg, 150 mg, Intramuscular, Q3 Months, Rebecca Farrell MD, 150 mg at 04/03/24 1045    Current Allergies     Allergies as of 05/26/2024 - Reviewed 05/26/2024   Allergen Reaction Noted    Ciprofloxacin Anaphylaxis 01/07/2019    Codeine Lightheadedness 08/28/2001    Morphine Lightheadedness 08/28/2001    Sulfa antibiotics Anaphylaxis and Rash 09/28/2017    Tramadol Lightheadedness 09/28/2017    Bactrim [sulfamethoxazole-trimethoprim] Hives 06/14/2023            The following portions of the patient's history were reviewed and updated as appropriate: allergies, current medications, past family history, past medical history, past social history, past  "surgical history and problem list.     Past Medical History:   Diagnosis Date    Addiction to drug (HCC)     Anxiety     Arthritis     Depression     Endometriosis     no current issues    Infectious viral hepatitis     history of hep c    Irritable bowel syndrome     Substance abuse (HCC)        Past Surgical History:   Procedure Laterality Date    ALVEOPLASTY N/A 10/9/2023    Procedure: BILATERAL LOWER LINGUAL SACHIN REDUCTION, ALVEOPLASTY ALL FOUR QUADRANTS, UPPER LEFT/ UPPER RIGHT, LOWER LEFT/ LOWER RIGHT;  Surgeon: Michael Awadallah;  Location: [facility];  Service: Maxillofacial    APPENDECTOMY      CHOLECYSTECTOMY      HEMORRHOID SURGERY         Family History   Problem Relation Age of Onset    Cancer Father     Drug abuse Father     Drug abuse Mother     No Known Problems Sister     No Known Problems Daughter     No Known Problems Maternal Grandmother     Breast cancer Maternal Grandfather 80    Prostate cancer Maternal Grandfather     Melanoma Maternal Grandfather     Colon cancer Maternal Grandfather 70    No Known Problems Paternal Grandmother     No Known Problems Paternal Grandfather     No Known Problems Daughter     No Known Problems Maternal Aunt          Medications have been verified.        Objective   /68 (BP Location: Right arm, Patient Position: Sitting)   Pulse 74   Temp 98.6 °F (37 °C) (Tympanic)   Resp 18   Ht 5' 5\" (1.651 m)   Wt 83.5 kg (184 lb)   SpO2 100%   BMI 30.62 kg/m²        Physical Exam     Physical Exam  Vitals reviewed.   Constitutional:       General: She is not in acute distress.     Appearance: Normal appearance. She is normal weight. She is not ill-appearing.   HENT:      Head: Normocephalic.      Nose: Nose normal.      Mouth/Throat:      Mouth: Mucous membranes are moist.   Eyes:      Conjunctiva/sclera: Conjunctivae normal.   Cardiovascular:      Rate and Rhythm: Normal rate and regular rhythm.      Pulses: Normal pulses.      Heart sounds: Normal heart sounds. " No murmur heard.     No friction rub. No gallop.   Pulmonary:      Effort: Pulmonary effort is normal.      Breath sounds: Normal breath sounds. No wheezing, rhonchi or rales.   Musculoskeletal:      Cervical back: Normal range of motion and neck supple.   Skin:     General: Skin is warm.      Findings: Rash present.      Comments: Circular erythematous rash with satellite lesions noted along left axilla.  Consistent with candidal rash.   Neurological:      Mental Status: She is alert.   Psychiatric:         Mood and Affect: Mood normal.         Behavior: Behavior normal.

## 2024-05-26 NOTE — PATIENT INSTRUCTIONS
Start diflucan as prescribed  Start nystatin cream as prescribed  Avoid shaving hair at affected area  Change razor blades frequently  Keep area clean and dry  Watch for signs of worsening sx as discussed  Follow up with PCP in 3-5 days.  Proceed to  ER if symptoms worsen.

## 2024-06-07 DIAGNOSIS — F51.04 PSYCHOPHYSIOLOGICAL INSOMNIA: ICD-10-CM

## 2024-06-07 RX ORDER — HYDROXYZINE PAMOATE 50 MG/1
50 CAPSULE ORAL
Qty: 30 CAPSULE | Refills: 2 | Status: SHIPPED | OUTPATIENT
Start: 2024-06-07

## 2024-06-10 ENCOUNTER — TELEPHONE (OUTPATIENT)
Age: 49
End: 2024-06-10

## 2024-06-10 NOTE — TELEPHONE ENCOUNTER
Caller: Patient     Doctor:     Reason for call: Patient scheduled for procedure today but does have a rash / yeast infection under armpit . Has been using a cream but wanting to make sure she will still be ok to come in for procedure today .       Please advise     Call back#: [phone]

## 2024-06-10 NOTE — TELEPHONE ENCOUNTER
RN s/w Dr Jackson who stated that we should reschedule when pt's yeast infection cleared.  Pt aware of same and appreciative

## 2024-06-10 NOTE — TELEPHONE ENCOUNTER
Spoke with the patient and made her aware to give me a all when her infection clears up. I gave her my direct line.

## 2024-06-15 DIAGNOSIS — Z00.6 ENCOUNTER FOR EXAMINATION FOR NORMAL COMPARISON OR CONTROL IN CLINICAL RESEARCH PROGRAM: ICD-10-CM

## 2024-07-02 ENCOUNTER — CLINICAL SUPPORT (OUTPATIENT)
Dept: OBGYN CLINIC | Facility: CLINIC | Age: 49
End: 2024-07-02

## 2024-07-02 VITALS
HEART RATE: 93 BPM | SYSTOLIC BLOOD PRESSURE: 114 MMHG | DIASTOLIC BLOOD PRESSURE: 78 MMHG | BODY MASS INDEX: 30.82 KG/M2 | WEIGHT: 185.2 LBS

## 2024-07-02 DIAGNOSIS — Z30.09 UNWANTED FERTILITY: Primary | ICD-10-CM

## 2024-07-02 PROCEDURE — 96372 THER/PROPH/DIAG INJ SC/IM: CPT

## 2024-07-02 RX ADMIN — MEDROXYPROGESTERONE ACETATE 150 MG: 150 INJECTION, SUSPENSION INTRAMUSCULAR at 13:46

## 2024-07-05 DIAGNOSIS — F41.1 GENERALIZED ANXIETY DISORDER WITH PANIC ATTACKS: ICD-10-CM

## 2024-07-05 DIAGNOSIS — F41.0 GENERALIZED ANXIETY DISORDER WITH PANIC ATTACKS: ICD-10-CM

## 2024-07-05 RX ORDER — ALPRAZOLAM 1 MG/1
TABLET ORAL
Qty: 75 TABLET | Refills: 2 | Status: SHIPPED | OUTPATIENT
Start: 2024-07-05

## 2024-07-05 NOTE — TELEPHONE ENCOUNTER
Medication Refill Request     Name of Medication Alprazolam  Dose/Frequency 1 mg/ Take 2 mg each afternoon and 0.5 mg each evening.   Quantity 75  Verified pharmacy   [x]  Verified ordering Provider   [x]  Does patient have enough for the next 3 days? Yes [] No [x]  Does patient have a follow-up appointment scheduled? Yes [x] No []   If so when is appointment: 7/22/2024

## 2024-07-22 ENCOUNTER — OFFICE VISIT (OUTPATIENT)
Dept: PSYCHIATRY | Facility: CLINIC | Age: 49
End: 2024-07-22
Payer: COMMERCIAL

## 2024-07-22 DIAGNOSIS — F41.1 GENERALIZED ANXIETY DISORDER WITH PANIC ATTACKS: Primary | ICD-10-CM

## 2024-07-22 DIAGNOSIS — F51.04 PSYCHOPHYSIOLOGICAL INSOMNIA: ICD-10-CM

## 2024-07-22 DIAGNOSIS — F41.0 GENERALIZED ANXIETY DISORDER WITH PANIC ATTACKS: Primary | ICD-10-CM

## 2024-07-22 PROCEDURE — 90833 PSYTX W PT W E/M 30 MIN: CPT | Performed by: STUDENT IN AN ORGANIZED HEALTH CARE EDUCATION/TRAINING PROGRAM

## 2024-07-22 PROCEDURE — 99214 OFFICE O/P EST MOD 30 MIN: CPT | Performed by: STUDENT IN AN ORGANIZED HEALTH CARE EDUCATION/TRAINING PROGRAM

## 2024-07-22 RX ORDER — HYDROXYZINE PAMOATE 50 MG/1
50 CAPSULE ORAL
Qty: 30 CAPSULE | Refills: 3 | Status: SHIPPED | OUTPATIENT
Start: 2024-07-22

## 2024-07-22 NOTE — BH TREATMENT PLAN
TREATMENT PLAN (Medication Management Only)        [facility]      Name and Date of Birth:  Shereen Paris 49 y.o. 1975 MRN: [id number]    Date of Visit: July 22, 2024    Diagnosis/Diagnoses:    1. Generalized anxiety disorder with panic attacks        2. Psychophysiological insomnia  hydrOXYzine pamoate (VISTARIL) 50 mg capsule          Strengths/Personal Resources for Self-Care: supportive family, taking medications as prescribed, ability to adapt to life changes, ability to communicate needs, ability to communicate well, ability to listen, ability to reason, ability to understand psychiatric illness, average or above intelligence, motivation for treatment, ability to negotiate basic needs, being resoureceful, self-reliance.    Area/Areas of need (in own words): anxiety symptoms  1. Long Term Goal: improve control of anxiety.  Target Date:6 months - 1/22/2025  Person/Persons responsible for completion of goal: Shereen  2.  Short Term Objective (s) - How will we reach this goal?:   A. Provider new recommended medication/dosage changes and/or continue medication(s): continue current medications as prescribed.  B. Keep regularly scheduled psychiatric appointments  C. Maintain adherence to psychotropic medication regimen   D. Maintain sobriety (September is anniversary month)  E. Maintain appropriate dietary intake  F. Practice adequate sleep hygiene    G. Continue to advocate for her needs regarding dental concerns      Target Date:6 months - 1/22/2025  Person/Persons Responsible for Completion of Goal: Shereen    Progress Towards Goals: continuing treatment    Treatment Modality: medication management every 3 months    Review due 180 days from date of this plan: 6 months - 1/22/2025  Expected length of service: ongoing treatment    My Physician/PA/NP and I have developed this plan together and I agree to work on the goals and objectives. I understand the treatment  goals that were developed for my treatment. Treatment Plan was completed with Shereen's assistance and input throughout today's session. Shereen consented to the information provided and documented above. Unfortunately, treatment plan was not signed at the time of this office visit secondary to issues related to signature keypad.

## 2024-07-22 NOTE — PSYCH
"MEDICATION MANAGEMENT NOTE        [facility]      Name and Date of Birth:  Shereen Paris 49 y.o. 1975 MRN: [id number]    Date of Visit: July 22, 2024    Reason for Visit: Follow-up visit for medication management       SUBJECTIVE:    Shereen Paris is a 49 y.o. female with past psychiatric history significant for generalized anxiety disorder with panic attacks, opiate dependence (Rx Zubsolv via [facility]), insomnia, and extensive history of polysubstance abuse (cannabis, hallucinogens, cocaine) who was personally seen and evaluated today at the [facility] outpatient clinic for follow-up and medication management. Shereen presents as anxious yet pleasant. Her thoughts are logical and organized. She completes assessment without difficulty.     Shereen endorses compliance with psychotropic medication regimen consisting of Xanax and Hydroxyzine. She denies adverse medication side effects. PDMP website reviewed, no acute concerns for abuse or misuse. Following last visit, Hydroxyzine was started to assist with insomnia. She tolerated this change well and reports experiencing \"great\" sleep as a result. She is now sleeping \"8-9 hours per night\" and finds that she has more energy and motivation. Acutely, Shereen denies new onset depressive symptoms. Her appetite is stable. She continues to struggle with diety intake, however, given her dental issues. She voices frustration today with repeated need for re-fitting as her dentures were not appropriately constructed. Supportive therapy provided. Shereen does not report SI/HI today. Her concentration and focus are erratic secondary to ongoing anxiety. She denies pathologic anhedonia or feelings of hopelessness. She does report uptick in tension and feeling on-edge as her chronic pain and neuropathy have been more problematic. She did not tolerate initiation of Gabapentin by another physician. I did offer " "use of Cymbalta for pain and anxiety control, to which she refused. Shereen continues to endorse excessive worry, nervousness, and anxious ruminations. She feels as if she is becoming \"co-dependent\" on her grandson. She reports stress related to him \"not talking\" and information was provided about early intervention services. She is pleased to share that they have started this process. Shereen reports restlessness and more frequent panic symptoms. She is appropriately taking Xanax. During today's examination, Shereen does not exhibit objective evidence of j carlos/hypomania or psychosis. Shereen is not currently irritable, grandiose, labile, or pathologically euphoric. Shereen is without perceptual disturbances, such as A/V hallucinations, paranoia, ideas of reference, or delusional beliefs. Shereen denies recent ETOH or illicit substance abuse. Shereen offers no further concerns.     Current Rating Scores:     None completed today.    Review Of Systems:      Constitutional negative   ENT negative   Cardiovascular negative   Respiratory negative   Gastrointestinal negative   Genitourinary negative   Musculoskeletal back pain and joint pain   Integumentary negative   Neurological neuropathic pain   Endocrine negative   Other Symptoms none, all other systems are negative       Past Psychiatric History: (unchanged information from previous note copied and italicized) - Information that is bolded has been updated.      Inpatient psychiatric admissions: Numerous past psychiatric admissions - last in 2017 at [facility] for depression, SI in context of drug use  Prior outpatient psychiatric linkage: Numerous psychiatrists in the past - Last linkage with CRNP via [facility]  Past/current psychotherapy: History of psychotherapy linkage, no current therapists  History of suicidal attempts/gestures: Numerous attempts in the past including overdose and \"cutting wrists\"  History of violence/aggressive behaviors: Denies  Psychotropic " medication trials: Lexapro, Prozac, Zoloft, Paxil, Remeron, Trazodone, Wellbutrin, BuSpar, VPA, Lithium, Risperdal, Seroquel, Gabapentin (swelling of tongue), Propranolol, Clonidine, Valium, Xanax, Klonopin, Adderall, Ativan, Librium, Doxepin, Ambien, Provigil, Pristiq (too much GI discomfort), Hydroxyzine (now)    Substance abuse inpatient/outpatient rehabilitation: 10+ rehab/detox admissions - first at the age of 18 in [place] - last in 2017 at [facility]     Substance Abuse History: (unchanged information from previous note copied and italicized) - Information that is bolded has been updated.     Extensive history of illict substance (cocaine, methamphetamine, cannabis, opiates, hallucinogen), and tobacco abuse. She denies a history of ETOH abuse. No past legal actions yet 1 prior arrest secondary to substance intoxication. The patient denies prior DWIs/DUIs. Extensive history of outpatient/inpatient rehabilitation programs. Shereen does not exhibit objective evidence of substance withdrawal during today's examination nor does Shereen appear under the influence of any psychoactive substance.          Social History: (unchanged information from previous note copied and italicized) - Information that is bolded has been updated.      Developmental: Denies a history of milestone/developmental delay. Denies a history of in-utero exposure to toxins/illicit substances. There is no documented history of IEP or need for special education.  Education: college graduate - [employer]  Marital history:   Living arrangement, social support: , has 2 children (daughter and son), daughter has child (so Shereen is grandmother now)  Occupational History: on permanent disability  Access to firearms: Denies direct access to weapons/firearms. Shereen Paris has no history of arrests or violence with a deadly weapon.      Traumatic History: (unchanged information from previous note copied and italicized) -  Information that is bolded has been updated.      Abuse:none is reported  Other Traumatic Events: Father dying in 2017 was problematic       Past Medical History:    Past Medical History:   Diagnosis Date    Addiction to drug (HCC)     Anxiety     Arthritis     Depression     Endometriosis     no current issues    Infectious viral hepatitis     history of hep c    Irritable bowel syndrome     Substance abuse (HCC)         Past Surgical History:   Procedure Laterality Date    ALVEOPLASTY N/A 10/9/2023    Procedure: BILATERAL LOWER LINGUAL SACHIN REDUCTION, ALVEOPLASTY ALL FOUR QUADRANTS, UPPER LEFT/ UPPER RIGHT, LOWER LEFT/ LOWER RIGHT;  Surgeon: Michael Awadallah;  Location: [facility];  Service: Maxillofacial    APPENDECTOMY      CHOLECYSTECTOMY      HEMORRHOID SURGERY       Allergies   Allergen Reactions    Ciprofloxacin Anaphylaxis     Interacts with Zubsolv    Codeine Lightheadedness     Reaction Date: 2011;     Morphine Lightheadedness     Reaction Date: 2011;     Sulfa Antibiotics Anaphylaxis and Rash    Tramadol Lightheadedness    Bactrim [Sulfamethoxazole-Trimethoprim] Hives       Substance Abuse History:    Social History     Substance and Sexual Activity   Alcohol Use Not Currently    Comment: rarely/holidays     Social History     Substance and Sexual Activity   Drug Use Not Currently    Types: Heroin, Cocaine       Social History:    Social History     Socioeconomic History    Marital status: /Civil Union     Spouse name: Lucius    Number of children: 2    Years of education: Not on file    Highest education level: Not on file   Occupational History    Occupation: homemaker   Tobacco Use    Smoking status: Former     Current packs/day: 0.00     Types: Cigarettes     Quit date: 3/14/2019     Years since quittin.3     Passive exposure: Past    Smokeless tobacco: Never   Vaping Use    Vaping status: Never Used   Substance and Sexual Activity    Alcohol use: Not Currently     Comment:  rarely/holidays    Drug use: Not Currently     Types: Heroin, Cocaine    Sexual activity: Yes     Partners: Male     Birth control/protection: Injection   Other Topics Concern    Not on file   Social History Narrative    Not on file     Social Determinants of Health     Financial Resource Strain: Low Risk  (4/3/2024)    Overall Financial Resource Strain (CARDIA)     Difficulty of Paying Living Expenses: Not hard at all   Food Insecurity: No Food Insecurity (1/8/2024)    Hunger Vital Sign     Worried About Running Out of Food in the Last Year: Never true     Ran Out of Food in the Last Year: Never true   Transportation Needs: No Transportation Needs (4/3/2024)    PRAPARE - Transportation     Lack of Transportation (Medical): No     Lack of Transportation (Non-Medical): No   Physical Activity: Insufficiently Active (9/19/2019)    Exercise Vital Sign     Days of Exercise per Week: 3 days     Minutes of Exercise per Session: 30 min   Stress: No Stress Concern Present (3/22/2022)    [employer] - Occupational Stress Questionnaire     Feeling of Stress : Only a little   Social Connections: Unknown (9/19/2019)    Social Connection and Isolation Panel [NHANES]     Frequency of Communication with Friends and Family: Patient declined     Frequency of Social Gatherings with Friends and Family: Patient declined     Attends Restorationist Services: Patient declined     Active Member of Clubs or Organizations: Patient declined     Attends Club or Organization Meetings: Patient declined     Marital Status: Patient declined   Intimate Partner Violence: Not At Risk (9/19/2019)    Humiliation, Afraid, Rape, and Kick questionnaire     Fear of Current or Ex-Partner: No     Emotionally Abused: No     Physically Abused: No     Sexually Abused: No   Housing Stability: Low Risk  (4/3/2024)    Housing Stability Vital Sign     Unable to Pay for Housing in the Last Year: No     Number of Times Moved in the Last Year: 1      Homeless in the Last Year: No       Family Psychiatric History:     Family History   Problem Relation Age of Onset    Cancer Father     Drug abuse Father     Drug abuse Mother     No Known Problems Sister     No Known Problems Daughter     No Known Problems Maternal Grandmother     Breast cancer Maternal Grandfather 80    Prostate cancer Maternal Grandfather     Melanoma Maternal Grandfather     Colon cancer Maternal Grandfather 70    No Known Problems Paternal Grandmother     No Known Problems Paternal Grandfather     No Known Problems Daughter     No Known Problems Maternal Aunt        History Review: The following portions of the patient's history were reviewed and updated as appropriate: allergies, current medications, past family history, past medical history, past social history, past surgical history, and problem list.         OBJECTIVE:     Vital signs in last 24 hours:    There were no vitals filed for this visit.    Mental Status Evaluation:    Appearance age appropriate, casually dressed, dressed appropriately, looks stated age, tattooed   Behavior pleasant, cooperative, appears anxious, good eye contact, restless   Speech normal rate, normal volume, normal pitch   Mood anxious   Affect normal range and intensity, appropriate   Thought Processes organized, goal directed, linear   Associations intact associations   Thought Content no overt delusions   Perceptual Disturbances: no auditory hallucinations, no visual hallucinations   Abnormal Thoughts  Risk Potential Suicidal ideation - None at present  Homicidal ideation - None at present  Potential for aggression - No   Orientation oriented to person, place, time/date, and situation   Memory recent and remote memory grossly intact   Consciousness alert and awake   Attention Span Concentration Span attention span and concentration are age appropriate   Intellect appears to be of average intelligence   Insight intact and good   Judgement intact and good    Muscle Strength and  Gait unstable gait   Motor activity no abnormal movements   Language no difficulty naming common objects   Fund of Knowledge adequate knowledge of current events   Pain moderate   Pain Scale Did not ask patient to formally rate       Laboratory Results: I have personally reviewed all pertinent laboratory/tests results    Recent Labs (last 2 months):   No visits with results within 2 Month(s) from this visit.   Latest known visit with results is:   Admission on 10/09/2023, Discharged on 10/09/2023   Component Date Value    EXT Preg Test, Ur 10/09/2023 Negative     Control 10/09/2023 Valid        Suicide/Homicide Risk Assessment:    The following interventions are recommended: no intervention changes needed      Lethality Statement:    Based on today's assessment and clinical criteria, Shereen Paris contracts for safety and is not an imminent risk of harm to self or others. Outpatient level of care is deemed appropriate at this current time. Shereen understands that if they can no longer contract for safety, they need to call the office or report to their nearest Emergency Room for immediate evaluation.      Assessment/Plan:     Shereen Paris is a 49 y.o. female with past psychiatric history significant for generalized anxiety disorder with panic attacks, opiate dependence (Rx Zubsolv via [facility]), insomnia, and extensive history of polysubstance abuse (cannabis, hallucinogens, cocaine) who was personally seen and evaluated today at the [facility] outpatient clinic for follow-up and medication management. Shereen endorses a longstanding history of anxiety and episodic depression that served as the catalyst for her substance abuse. She states that her biological parents abused illicit substances and thus, they were emotionally neglectful and inconsistent. At the age of 14, Shereen began using cannabis which ultimately lead to use of numerous illicit substances. Shereen  "reports placement in 10+ detox/rehabilitation programs throughout the course of her life. Her first rehabilitation program was at age 18 in [place]. She last attended rehab via [facility] in 2017, following the death of her father and and subsequent inpatient admission at [facility]. She endorses sobriety from opiates and other illicit substances for the last 3+ years. She was recently psychiatrically managed via CRNP Mrs. Cox through [facility]. However, given their change in policy regarding concurrent use of opiates and benzodiazepines, Shereen was transferred to [facility] for safer, more appropriate tapering process. Shereen has been treated with benzodiazepines since her early 20's. She was previously on Xanax 6mg Daily (2mg TID) but was recently tapered to 4mg Daily (2mg AM, 1mg afternoon, 1mg QHS) over the last 8 months. PDMP webistwilliam reviewed, no concerns for abuse or misuse. I spoke at length with Shereen about 's policy regarding concurrent use of opiates and benzodiazepines and risks/alternatives to treatment. She voiced understanding regarding the need to taper off Xanax and was agreeable to do so. I also discussed possibility of faster tapering process via detox program at [facility], to which she was resistant given responsibility of caring for her family. Shereen denies most neurovegetative symptomatology suggestive of major depressive disorder. Shereen adamantly denies acute thoughts of suicide or self-harm. Shereen has no plans to harm others. There is a documented history of prior suicidal gestures/suicidal attempts. Shereen admits to overdosing and \"cutting her wrists\" in the past. She has not harmed herself or attempted to end her life in 4+ years. Shereen endorses a historical struggle with anxiety and symptomatology consistent with generalized anxiety disorder. Shereen reports anxiety that is pathologic in nature. Shereen endorses excessive nervousness, irrational worry, and overt anxiousness. " Shereen is not pervasively restless or tense but does report feeling episodically keyed-up and on-edge. Shereen experiences disruption in energy and concentration secondary to anxiety. There is evidence to suggest that Shereen experiences irritability, inability to relax, and disruption in sleep in the past, secondary to pathologic anxiety. Shereen denies new-onset panic symptomatology but does report a history of panic attacks characterized by tremor, SOB, fear of impending doom, and overt anxiousness. She denies maladaptive behaviors. Shereen vehemently denies any acute or chronic history suggestive of an underlying affective (bipolar) organization. Shereen denies historical symptomatology suggestive of an underlying psychotic process. Shereen denies historical symptomatology suggestive of PTSD, OCD, or disordered eating.         Today's Plan:     Psychopharmacologically, Shereen reports benefit and tolerability with Xanax and Atarax. I did offer to start Cymbalta, to which she refused. She will consider a trial of Lyrica via her pain medicine physician. She did previously trial a TCA (Nortriptyline) which resulted in nocturnal delirium.         DSM-V Diagnoses:      1.) Generalized Anxiety Disorder with Panic Attacks   2.) Opiate dependence, continuous  3.) Polysubstance abuse by history   4.) Insomnia        Treatment Recommendations/Precautions:     1.) Generalized Anxiety Disorder with Panic Attacks   - Continue Xanax 2mg afternoon, 0.5mg QHS (total of 0.5mg reduction) - tapering process in effect (was previously on high doses)              - I inherited Shereen on high-dose Xanax - plan has been to ultimately taper off Xanax given concurrent use of Suboxone, however, Shereen has been on BZ for 20+ years so tapering process will be arduous      2.) Opiate dependence, continuous  - [facility]       3.) Polysubstance abuse by history   - Currently sober - 6.5 years (anniversary is September)  - Counseled to  avoid ETOH, illict substances, and nicotine secondary to the detrimental effects of these substances on mental and physical health     4.) Insomnia  - Continue PRN Hydroxyzine 50mg QHS      Medication management every 4 months  Aware of need to follow up with family physician for medical issues  Aware of 24 hour and weekend coverage for urgent situations accessed by calling [facility] main practice number    Medications Risks/Benefits      Risks, Benefits And Possible Side Effects Of Medications:    Risks, benefits, and possible side effects of medications explained to Shereen including risks of misuse, abuse or dependence, sedation and respiratory depression related to treatment with benzodiazepine medications. She verbalizes understanding and agreement for treatment.    Controlled Medication Discussion:     Shereen has been filling controlled prescriptions on time as prescribed according to [place] Prescription Drug Monitoring Program  Discussed with Shereen the risks of sedation, respiratory depression, impairment of ability to drive and potential for abuse and addiction related to treatment with benzodiazepine medications. She understands risk of treatment with benzodiazepine medications, agrees to not drive if feels impaired and agrees to take medications as prescribed  Discussed with Shereen Black Box warning on concurrent use of benzodiazepines and opioid medications including sedation, respiratory depression, coma and death. She understands the risk of treatment with benzodiazepines in addition to opioids and wants to continue taking those medications    Psychotherapy Provided:     Individual psychotherapy provided: Yes  Counseling was provided during the session today for 17 minutes.  Medications, treatment progress and treatment plan reviewed with Shereen.  Medication education provided to Shereen.  Recent stressors discussed with Shereen including family problems, family conflict, family  issues, marital problems, health issues, medical problems, chronic pain, recent medication change, limited support, everyday stressors, and ongoing anxiety.  Coping strategies reviewed with Shereen.   Educated on importance of medication and treatment compliance.  Supportive therapy provided.      Treatment Plan:    Completed and signed during the session: Yes - with Shereen      Visit Time    Visit Start Time: 12:30 PM  Visit Stop Time: 1:07 PM  Total Visit Duration:  37 minutes     The total visit duration detailed above includes: patient engagement, medication management, psychotherapy/counseling, discussion regarding treatment goals, documentation, review of past medical records, and coordination of care.      Note Share Disclaimer:     This note was not shared with the patient due to reasonable likelihood of causing patient harm      Tu Hardy MD  Board Certified Diplomate of the American Board of Psychiatry and Neurology  07/22/24

## 2024-09-18 ENCOUNTER — CLINICAL SUPPORT (OUTPATIENT)
Dept: OBGYN CLINIC | Facility: CLINIC | Age: 49
End: 2024-09-18

## 2024-09-18 VITALS
SYSTOLIC BLOOD PRESSURE: 124 MMHG | BODY MASS INDEX: 32.09 KG/M2 | HEIGHT: 65 IN | WEIGHT: 192.6 LBS | DIASTOLIC BLOOD PRESSURE: 84 MMHG | HEART RATE: 91 BPM

## 2024-09-18 DIAGNOSIS — Z30.42 ENCOUNTER FOR SURVEILLANCE OF INJECTABLE CONTRACEPTIVE: Primary | ICD-10-CM

## 2024-09-18 PROCEDURE — 96372 THER/PROPH/DIAG INJ SC/IM: CPT

## 2024-09-18 RX ADMIN — MEDROXYPROGESTERONE ACETATE 150 MG: 150 INJECTION, SUSPENSION INTRAMUSCULAR at 02:43

## 2024-09-18 NOTE — PROGRESS NOTES
Depo given to patient in left buttock on 9/18/2024.    NDC: 08320-366-65  LOT: [id number]  EXP: 10/31/2028

## 2024-09-27 DIAGNOSIS — F41.0 GENERALIZED ANXIETY DISORDER WITH PANIC ATTACKS: Primary | ICD-10-CM

## 2024-09-27 DIAGNOSIS — F41.1 GENERALIZED ANXIETY DISORDER WITH PANIC ATTACKS: Primary | ICD-10-CM

## 2024-09-27 RX ORDER — ALPRAZOLAM 1 MG
TABLET ORAL
Qty: 75 TABLET | Refills: 0 | Status: SHIPPED | OUTPATIENT
Start: 2024-09-28 | End: 2024-10-28

## 2024-09-27 NOTE — TELEPHONE ENCOUNTER
Reason for call:   [x] Refill   [] Prior Auth  [x] Other: Pt was informed by pharmacy she had no refill left.    Office:   [] PCP/Provider -   [x] Specialty/Provider - Tu Hardy MD     Medication: ALPRAZolam 1 mg    Dose/Frequency: 2mg each afternoon and 0.5mg each evening     Quantity: 75 tabs    Pharmacy: [facility] - [place], [place] - [address]      Does the patient have enough for 3 days?   [] Yes   [x] No - Send as HP to POD

## 2024-09-27 NOTE — TELEPHONE ENCOUNTER
Xanax was escripted for 30 days, no RF. Due on 9/29/24 as per PDMP, but [facility] pharmacy is closed on Sunday so fill date was indicated on Rx as 9/28/24

## 2024-09-27 NOTE — TELEPHONE ENCOUNTER
Medication:xanax  09/16/2024 09/16/2024 Zubsolv (Tablet) 56.0 28 8.6 MG-2.1 MG [facility] Commercial Insurance 0 / 0 PA   1 [id number] 08/30/2024 07/05/2024 ALPRAZolam (Tablet) 75.0 30 1 MG NA [facility] Medicaid 2 / 2 PA   1 [id number] 08/19/2024 08/19/2024 Zubsolv (Tablet) 56.0 28 8.6 MG-2.1 MG [facility] 0 / 0 PA   1 [id number] 08/02/2024 07/05/2024 ALPRAZolam (Tablet) 75.0 30 1 MG NA [facility] Medicaid 1 / 2 PA   1 [id number] 07/22/2024 07/22/2024 Zubsolv (Tablet) 56.0 28 8.6 MG-2.1 MG WYATT LANGSTON [facility] Commercial Insurance 0 / 0 PA   1 [id number] 07/05/2024 07/05/2024 ALPRAZolam (Tablet) 75.0 30 1 MG NA [facility] Medicaid 0 / 2 PA   1 [id number] 06/24/2024 06/24/2024 Zubsolv (Tablet) 56.0 28 8.6 MG-2.1 MG [facility] Commercial Insurance 0 / 0 PA   1 [id number] 06/19/2024 02/08/2024 Brixadi Monthly 96Mg (Solution, Extended Release) 0.3 28 32 MG/0.09 ML WYATT [facility]ST[facility]

## 2024-10-25 DIAGNOSIS — F41.1 GENERALIZED ANXIETY DISORDER WITH PANIC ATTACKS: ICD-10-CM

## 2024-10-25 DIAGNOSIS — F41.0 GENERALIZED ANXIETY DISORDER WITH PANIC ATTACKS: ICD-10-CM

## 2024-10-25 RX ORDER — ALPRAZOLAM 1 MG/1
TABLET ORAL
Qty: 75 TABLET | Refills: 1 | Status: SHIPPED | OUTPATIENT
Start: 2024-10-25 | End: 2024-11-24

## 2024-10-25 NOTE — TELEPHONE ENCOUNTER
10/14/2024 10/14/2024 Zubsolv (Tablet) 56.0 28 8.6 MG-2.1 MG NA [facility] Commercial Insurance 0 / 0 PA      1 [id number] 09/28/2024 09/27/2024 ALPRAZolam (Tablet) 75.0 30 1 [facility] / 0 PA    1 [id number] 09/16/2024 09/16/2024 Zubsolv (Tablet) 56.0 28 8.6 MG-2.1 MG NA [facility] Commercial Insurance 0 / 0 PA    1 [id number] 08/30/2024 07/05/2024 ALPRAZolam (Tablet) 75.0 30 1 MG NA [facility] Medicaid 2 / 2 PA    1 [id number] 08/19/2024 08/19/2024 Zubsolv (Tablet) 56.0 28 8.6 MG-2.1 MG NA [facility] Commercial Insurance 0 / 0 PA    1 [id number] 08/02/2024 07/05/2024 ALPRAZolam (Tablet) 75.0 30 1 MG NA [facility]

## 2024-10-25 NOTE — TELEPHONE ENCOUNTER
Reason for call:   [] Refill   [] Prior Auth  [] Other:     Office:   [] PCP/Provider -   [x] Specialty/Provider -     ALPRAZolam (XANAX) 1 mg tablet 2 mg, Daily after lunch, AND 0.5 mg, Every evening Oral          Edit  Cancel Reorder       Quantity: 30    Pharm[facility]: [facility]    Does the patient have enough for 3 days?   [] Yes   [x] No - Send as HP to POD

## 2024-11-25 ENCOUNTER — OFFICE VISIT (OUTPATIENT)
Dept: PSYCHIATRY | Facility: CLINIC | Age: 49
End: 2024-11-25
Payer: COMMERCIAL

## 2024-11-25 ENCOUNTER — TELEPHONE (OUTPATIENT)
Dept: PSYCHIATRY | Facility: CLINIC | Age: 49
End: 2024-11-25

## 2024-11-25 DIAGNOSIS — F41.0 GENERALIZED ANXIETY DISORDER WITH PANIC ATTACKS: Primary | ICD-10-CM

## 2024-11-25 DIAGNOSIS — F41.1 GENERALIZED ANXIETY DISORDER WITH PANIC ATTACKS: Primary | ICD-10-CM

## 2024-11-25 DIAGNOSIS — F51.04 PSYCHOPHYSIOLOGICAL INSOMNIA: ICD-10-CM

## 2024-11-25 PROCEDURE — 90833 PSYTX W PT W E/M 30 MIN: CPT | Performed by: STUDENT IN AN ORGANIZED HEALTH CARE EDUCATION/TRAINING PROGRAM

## 2024-11-25 PROCEDURE — 99214 OFFICE O/P EST MOD 30 MIN: CPT | Performed by: STUDENT IN AN ORGANIZED HEALTH CARE EDUCATION/TRAINING PROGRAM

## 2024-11-25 RX ORDER — ALPRAZOLAM 1 MG/1
TABLET ORAL
Qty: 75 TABLET | Refills: 2 | Status: SHIPPED | OUTPATIENT
Start: 2024-11-25 | End: 2024-12-25

## 2024-11-25 RX ORDER — HYDROXYZINE PAMOATE 50 MG/1
50 CAPSULE ORAL
Qty: 30 CAPSULE | Refills: 3 | Status: SHIPPED | OUTPATIENT
Start: 2024-11-25

## 2024-11-25 NOTE — BH TREATMENT PLAN
TREATMENT PLAN (Medication Management Only)        [facility]      Name and Date of Birth:  Shereen Paris 49 y.o. 1975 MRN: [id number]    Date of Visit: November 25, 2024    Diagnosis/Diagnoses:    1. Generalized anxiety disorder with panic attacks  ALPRAZolam (XANAX) 1 mg tablet      2. Psychophysiological insomnia  hydrOXYzine pamoate (VISTARIL) 50 mg capsule          Strengths/Personal Resources for Self-Care: supportive family, taking medications as prescribed, ability to adapt to life changes, ability to communicate needs, ability to communicate well, ability to listen, ability to reason, ability to understand psychiatric illness, average or above intelligence, good understanding of illness, motivation for treatment, ability to negotiate basic needs, being resoureceful, self-reliance, sense of humor.    Area/Areas of need (in own words): anxiety symptoms    1. Long Term Goal: improve control of anxiety.  Target Date:6 months - 5/25/2025  Person/Persons responsible for completion of goal: Shereen    2.  Short Term Objective (s) - How will we reach this goal?:   A. Provider new recommended medication/dosage changes and/or continue medication(s): continue current medications as prescribed.  B. Keep regularly scheduled psychiatric appointments  C. Maintain adherence to psychotropic medication regimen   D. Set boundaries with daughter  E. Maintain appropriate dietary intake  F. Practice adequate sleep hygiene    G. Start PT for physical pain     Target Date:6 months - 5/25/2025  Person/Persons Responsible for Completion of Goal: Shereen    Progress Towards Goals: continuing treatment    Treatment Modality: medication management every 3 months    Review due 180 days from date of this plan: 6 months - 5/25/2025  Expected length of service: ongoing treatment    My Physician/PA/NP and I have developed this plan together and I agree to work on the goals and  objectives. I understand the treatment goals that were developed for my treatment. Treatment Plan was completed with Shereen's assistance and input throughout today's session. Shereen consented to the information provided and documented above. Unfortunately, treatment plan was not physically signed at the time of this office visit secondary to time constraints and issues related to signature keypad. Again, Shereen did verbally consent.
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
## 2024-11-25 NOTE — ASSESSMENT & PLAN NOTE
Orders:    hydrOXYzine pamoate (VISTARIL) 50 mg capsule; Take 1 capsule (50 mg total) by mouth daily at bedtime as needed (Sleep)

## 2024-11-25 NOTE — ASSESSMENT & PLAN NOTE
Orders:    ALPRAZolam (XANAX) 1 mg tablet; Take 2 tablets (2 mg total) by mouth daily after lunch AND 0.5 tablets (0.5 mg total) every evening.

## 2024-12-09 DIAGNOSIS — Z30.42 ENCOUNTER FOR SURVEILLANCE OF INJECTABLE CONTRACEPTIVE: ICD-10-CM

## 2024-12-09 RX ORDER — MEDROXYPROGESTERONE ACETATE 150 MG/ML
150 INJECTION, SUSPENSION INTRAMUSCULAR
Qty: 1 ML | Refills: 0 | Status: SHIPPED | OUTPATIENT
Start: 2024-12-09

## 2024-12-11 ENCOUNTER — CLINICAL SUPPORT (OUTPATIENT)
Dept: OBGYN CLINIC | Facility: CLINIC | Age: 49
End: 2024-12-11

## 2024-12-11 VITALS
HEART RATE: 110 BPM | SYSTOLIC BLOOD PRESSURE: 146 MMHG | BODY MASS INDEX: 34.05 KG/M2 | WEIGHT: 204.6 LBS | DIASTOLIC BLOOD PRESSURE: 80 MMHG

## 2024-12-11 DIAGNOSIS — Z30.42 ENCOUNTER FOR SURVEILLANCE OF INJECTABLE CONTRACEPTIVE: Primary | ICD-10-CM

## 2024-12-11 PROCEDURE — 96372 THER/PROPH/DIAG INJ SC/IM: CPT

## 2024-12-11 RX ADMIN — MEDROXYPROGESTERONE ACETATE 150 MG: 150 INJECTION, SUSPENSION INTRAMUSCULAR at 13:43

## 2024-12-11 NOTE — PROGRESS NOTES
Pt here for depo injection given in the right buttocks.       NDC#[id number]  LOT#[id number]  EXP#4/30/2028

## 2024-12-16 RX ORDER — MEDROXYPROGESTERONE ACETATE 150 MG/ML
150 INJECTION, SUSPENSION INTRAMUSCULAR
Status: SHIPPED | OUTPATIENT
Start: 2024-12-16 | End: 2025-12-11

## 2024-12-20 ENCOUNTER — TELEPHONE (OUTPATIENT)
Dept: PSYCHIATRY | Facility: CLINIC | Age: 49
End: 2024-12-20

## 2024-12-20 NOTE — TELEPHONE ENCOUNTER
Patient states that the medication has not been covered the past 2 months because it has an interaction with another medication.   Pharmacy gave patient phone number to call [phone] to get approved. Patient is not sure if it just needs an override or a prior auth.     Reason for call:   [] Refill   [x] Prior Auth  [] Other:     Office:   [] PCP/Provider -   [x] Specialty/Provider - [facility]  Authorized By: Tu Hardy MD      Medication: ALPRAZolam (XANAX) 1 mg tablet    Dose/Frequency: Take 2 tablets (2 mg total) by mouth daily after lunch AND 0.5 tablets (0.5 mg total) every evening.    Quantity: 75 tablet    Pharmacy: [address]     Does the patient have enough for 3 days?   [] Yes   [] No - Send as HP to POD     Please advise.

## 2024-12-20 NOTE — TELEPHONE ENCOUNTER
PA for Alprazolam 1 mg SUBMITTED to "[employer]"     via    [x]CMM-KEY: [id number]  []Surescripts-Case ID #   []Availity-Auth ID # NDC #   []Faxed to plan   []Other website   []Phone call Case ID #     []PA sent as URGENT    All office notes, labs and other pertaining documents and studies sent. Clinical questions answered. Awaiting determination from insurance company.     Turnaround time for your insurance to make a decision on your Prior Authorization can take 7-21 business days.

## 2024-12-20 NOTE — TELEPHONE ENCOUNTER
Called pharmacy and confirmed PA is required due to interaction with other drug/benzo medication use.    Will refer to Prior Authorization pod for processing.

## 2024-12-23 NOTE — TELEPHONE ENCOUNTER
PA for Alprazolam 1 mg  APPROVED     Date(s) approved 12/20/24*12/20/25    Case #    Patient advised by          []Pinchdhart Message  []Phone call   [x]LMOM  []L/M to call office as no active Communication consent on file  []Unable to leave detailed message as VM not approved on Communication consent       Pharmacy advised by    [x]Fax  []Phone call    Approval letter scanned into Media Yes

## 2025-02-03 ENCOUNTER — ANNUAL EXAM (OUTPATIENT)
Dept: OBGYN CLINIC | Facility: CLINIC | Age: 50
End: 2025-02-03

## 2025-02-03 VITALS — DIASTOLIC BLOOD PRESSURE: 88 MMHG | SYSTOLIC BLOOD PRESSURE: 150 MMHG | WEIGHT: 198 LBS | BODY MASS INDEX: 32.95 KG/M2

## 2025-02-03 DIAGNOSIS — Z12.4 SCREENING FOR CERVICAL CANCER: ICD-10-CM

## 2025-02-03 DIAGNOSIS — Z12.11 SCREENING FOR COLON CANCER: ICD-10-CM

## 2025-02-03 DIAGNOSIS — Z30.09 UNWANTED FERTILITY: ICD-10-CM

## 2025-02-03 DIAGNOSIS — Z12.39 ENCOUNTER FOR BREAST CANCER SCREENING USING NON-MAMMOGRAM MODALITY: ICD-10-CM

## 2025-02-03 DIAGNOSIS — Z01.419 ENCOUNTER FOR GYNECOLOGICAL EXAMINATION WITHOUT ABNORMAL FINDING: Primary | ICD-10-CM

## 2025-02-03 DIAGNOSIS — Z11.51 SCREENING FOR HPV (HUMAN PAPILLOMAVIRUS): ICD-10-CM

## 2025-02-03 DIAGNOSIS — Z12.31 ENCOUNTER FOR SCREENING MAMMOGRAM FOR MALIGNANT NEOPLASM OF BREAST: ICD-10-CM

## 2025-02-03 PROBLEM — B96.89 BACTERIAL VAGINOSIS: Status: RESOLVED | Noted: 2018-10-15 | Resolved: 2025-02-03

## 2025-02-03 PROBLEM — Z30.42 ENCOUNTER FOR SURVEILLANCE OF INJECTABLE CONTRACEPTIVE: Status: RESOLVED | Noted: 2020-09-08 | Resolved: 2025-02-03

## 2025-02-03 PROBLEM — N76.0 BACTERIAL VAGINOSIS: Status: RESOLVED | Noted: 2018-10-15 | Resolved: 2025-02-03

## 2025-02-03 PROBLEM — F51.04 PSYCHOPHYSIOLOGICAL INSOMNIA: Status: RESOLVED | Noted: 2021-08-31 | Resolved: 2025-02-03

## 2025-02-03 PROBLEM — B37.31 CANDIDAL VULVOVAGINITIS: Status: RESOLVED | Noted: 2018-01-02 | Resolved: 2025-02-03

## 2025-02-03 PROCEDURE — 99396 PREV VISIT EST AGE 40-64: CPT | Performed by: NURSE PRACTITIONER

## 2025-02-03 PROCEDURE — G0145 SCR C/V CYTO,THINLAYER,RESCR: HCPCS | Performed by: NURSE PRACTITIONER

## 2025-02-03 PROCEDURE — G0476 HPV COMBO ASSAY CA SCREEN: HCPCS | Performed by: NURSE PRACTITIONER

## 2025-02-03 RX ORDER — MEDROXYPROGESTERONE ACETATE 150 MG/ML
150 INJECTION, SUSPENSION INTRAMUSCULAR
Qty: 1 ML | Refills: 3 | Status: SHIPPED | OUTPATIENT
Start: 2025-02-03

## 2025-02-03 NOTE — PATIENT INSTRUCTIONS
Thank you for your confidence in our team.   We appreciate you and welcome your feedback.   If you receive a survey from us, please take a few moments to let us know how we are doing.   Sincerely,  HILARIO Yeager       OBESITY     Obesity is defined as a body mass index (BMI) which is greater than 30. Your Body mass index is 32.95 kg/m²..    The risks of obesity include  many health problems, such as injuries or physical disability. You may need tests to check for the following:  Diabetes     High blood pressure or high cholesterol     Heart disease     Gallbladder or liver disease     Cancer of the colon, breast, prostate, liver, or kidney     Sleep apnea     Arthritis or gout    Seek care immediately if:   You have a severe headache, confusion, or difficulty speaking.     You have weakness on one side of your body.     You have chest pain, sweating, or shortness of breath.    Contact your healthcare provider if:   You have symptoms of gallbladder or liver disease, such as pain in your upper abdomen.    You have knee or hip pain and discomfort while walking.     You have symptoms of diabetes, such as intense hunger and thirst, and frequent urination.     You have symptoms of sleep apnea, such as snoring or daytime sleepiness.     You have questions or concerns about your condition or care.    Treatment for obesity  focuses on helping you lose weight to improve your health. Even a small decrease in BMI can reduce the risk for many health problems. Your healthcare provider will help you set a weight-loss goal.  Lifestyle changes  are the first step in treating obesity. These include making healthy food choices and getting regular physical activity. Your healthcare provider may suggest a weight-loss program that involves coaching, education, and therapy.     Medicine  may help you lose weight when it is used with a healthy diet and physical activity.     Surgery  can help you lose weight if you are very  obese and have other health problems. There are several types of weight-loss surgery. Ask your healthcare provider for more information.    Be successful losing weight:   Set small, realistic goals.  An example of a small goal is to walk for 20 minutes 5 days a week. Anther goal is to lose 5% of your body weight.    Tell friends, family members, and coworkers about your goals  and ask for their support. Ask a friend to lose weight with you, or join a weight-loss support group.    Identify foods or triggers that may cause you to overeat , and find ways to avoid them. Remove tempting high-calorie foods from your home and workplace. Place a bowl of fresh fruit on your kitchen counter. If stress causes you to eat, then find other ways to cope with stress.     Keep a diary to track what you eat and drink.  Also write down how many minutes of physical activity you do each day. Weigh yourself once a week and record it in your diary.    Eating changes:  You will need to eat 500 to 1,000 fewer calories each day than you currently eat to lose 1 to 2 pounds a week. The following changes will help you cut calories:  Eat smaller portions.  Use small plates, no larger than 9 inches in diameter. Fill your plate half full of fruits and vegetables. Measure your food using measuring cups until you know what a serving size looks like.     Eat 3 meals and 1 or 2 snacks each day.  Plan your meals in advance. Cook and eat at home most of the time. Eat slowly.     Eat fruits and vegetables at every meal.  They are low in calories and high in fiber, which makes you feel full. Do not add butter, margarine, or cream sauce to vegetables. Use herbs to season steamed vegetables.     Eat less fat and fewer fried foods.  Eat more baked or grilled chicken and fish. These protein sources are lower in calories and fat than red meat. Limit fast food. Dress your salads with olive oil and vinegar instead of bottled dressing.     Limit the amount of  sugar you eat.  Do not drink sugary beverages. Limit alcohol.    Activity changes:  Physical activity is good for your body in many ways. It helps you burn calories and build strong muscles. It decreases stress and depression, and improves your mood. It can also help you sleep better. Talk to your healthcare provider before you begin an exercise program.  Exercise for at least 30 minutes 5 days a week.  Start slowly. Set aside time each day for physical activity that you enjoy and that is convenient for you. It is best to do both weight training and an activity that increases your heart rate, such as walking, bicycling, or swimming.     Find ways to be more active.  Do yard work and housecleaning. Walk up the stairs instead of using elevators. Spend your leisure time going to events that require walking, such as outdoor festivals or fairs. This extra physical activity can help you lose weight and keep it off.    Follow up with your primary healthcare provider as directed.  You may need to meet with a dietitian. Write down your questions so you remember to ask them during your visits.

## 2025-02-03 NOTE — PROGRESS NOTES
ANNUAL GYNECOLOGICAL EXAMINATION    Shereen Paris is a 49 y.o. female who presents today for annual GYN exam.  Her last pap smear was performed 2020 and result was NILM with negative HPV.  She reports no history of abnormal pap smears in her past.  Her last mammogram was performed 3/30/2022 and result was WNL.  States she had colonoscopy performed many years ago.  She had HIV screening performed 10/12/2021 and it was negative.  She reports menses as absent due to Depoprovera therapy.  Her general medical history has been reviewed and she reports it as follows:    Past Medical History:   Diagnosis Date    Anxiety     Arthritis     Chlamydia     Depression     Hepatitis C     Migraine      Past Surgical History:   Procedure Laterality Date    ALVEOPLASTY N/A 10/09/2023    Procedure: BILATERAL LOWER LINGUAL SACHIN REDUCTION, ALVEOPLASTY ALL FOUR QUADRANTS, UPPER LEFT/ UPPER RIGHT, LOWER LEFT/ LOWER RIGHT;  Surgeon: Michael Awadallah;  Location: [facility];  Service: Maxillofacial    APPENDECTOMY      CHOLECYSTECTOMY      HEMORRHOID SURGERY       OB History          4    Para   2    Term   2       0    AB   2    Living   2         SAB   0    IAB   2    Ectopic   0    Multiple   0    Live Births   2               Social History     Tobacco Use    Smoking status: Former     Types: Cigarettes    Smokeless tobacco: Never    Tobacco comments:     Quit 2019   Vaping Use    Vaping status: Every Day    Substances: THC, CBD   Substance Use Topics    Alcohol use: Not Currently     Comment: last drink 2022    Drug use: Not Currently     Types: Heroin, Cocaine, Methamphetamines     Comment: last used 2017 - manages w/Suboxone therapy     Social History     Substance and Sexual Activity   Sexual Activity Yes    Partners: Male    Birth control/protection: Injection     Cancer-related family history includes Breast cancer (age of onset: 80) in her maternal grandfather; Cancer in her father and  maternal grandfather; Colon cancer (age of onset: 70) in her maternal grandfather; Melanoma in her maternal grandfather; Prostate cancer in her maternal grandfather.    Current Outpatient Medications   Medication Instructions    ALPRAZolam (XANAX) 1 mg tablet Take 2 tablets (2 mg total) by mouth daily after lunch AND 0.5 tablets (0.5 mg total) every evening.    Buprenorphine HCl-Naloxone HCl 8.6-2.1 MG SUBL No dose, route, or frequency recorded.    hydrOXYzine pamoate (VISTARIL) 50 mg, Oral, Daily at bedtime PRN    medroxyPROGESTERone (DEPO-PROVERA) 150 mg, Intramuscular, Every 3 months       Review of Systems:  Review of Systems   Constitutional: Negative.    Gastrointestinal: Negative.    Genitourinary:  Positive for menstrual problem (absent due to Depoprovera). Negative for difficulty urinating, pelvic pain and vaginal discharge.   Skin: Negative.        Physical Exam:  /88 (BP Location: Right arm, Patient Position: Sitting, Cuff Size: Standard)   Wt 89.8 kg (198 lb)   LMP  (LMP Unknown)   BMI 32.95 kg/m²   Physical Exam  Constitutional:       General: She is not in acute distress.     Appearance: She is well-developed.   Genitourinary:      Vulva normal.      No lesions in the vagina.      Anterior and posterior vaginal prolapse present.       Right Adnexa: not tender and no mass present.     Left Adnexa: not tender and no mass present.     No cervical motion tenderness or lesion.      Uterus is not tender.   Breasts:     Right: No mass, nipple discharge, skin change or tenderness.      Left: No mass, nipple discharge, skin change or tenderness.   Neck:      Thyroid: No thyromegaly.   Cardiovascular:      Rate and Rhythm: Normal rate and regular rhythm.   Pulmonary:      Effort: Pulmonary effort is normal.   Abdominal:      Palpations: Abdomen is soft.      Tenderness: There is no abdominal tenderness.   Musculoskeletal:      Cervical back: Neck supple.   Neurological:      Mental Status: She is alert  and oriented to person, place, and time.   Skin:     General: Skin is warm and dry.   Vitals reviewed.       Assessment/Plan:   1. Normal well-woman GYN exam.  2. Cervical cancer screening:  Normal cervical exam.  Pap smear done with HPV co-testing.  Has not received HPV vaccine in the past and she is beyond the age of recommended administration.    3. STD screening:  Patient declines.   4. Breast cancer screening:  Normal breast exam.  Order placed for bilateral screening mammogram.  Reviewed breast self-awareness.   5. Colon cancer screening:  Order placed for consultation with Gastroenterology for consultation to discuss screening.  States she prefers to perform Cologuard.  Advised she may to reach out to her PCP for ordering if she desires.   6. Depression Screening: Patient's depression screening was assessed with a PHQ-2 score of 0. Clinically patient does not have depression. No treatment is required.   7. BMI Counseling: Body mass index is 32.95 kg/m². Discussed the patient's BMI with her. The BMI is above normal. Nutrition recommendations include reducing portion sizes and decreasing overall calorie intake.   8. Contraception:  Depoprovera.  Given Rx refills for another year.   9. Return to office in 1 year for annual GYN exam.    Reviewed with patient that test results are available in [facility] immediately, but that they will not necessarily be reviewed by me immediately.  Explained that I will review results at my earliest opportunity and contact patient appropriately.

## 2025-02-06 ENCOUNTER — RESULTS FOLLOW-UP (OUTPATIENT)
Dept: OBGYN CLINIC | Facility: CLINIC | Age: 50
End: 2025-02-06

## 2025-02-06 LAB
LAB AP GYN PRIMARY INTERPRETATION: NORMAL
Lab: NORMAL

## 2025-02-24 ENCOUNTER — TELEPHONE (OUTPATIENT)
Dept: PSYCHIATRY | Facility: CLINIC | Age: 50
End: 2025-02-24

## 2025-02-24 NOTE — TELEPHONE ENCOUNTER
LVM to inform f/u with [facility] provider will need to be R/S as provider is on vacation. Please R/S upon return call

## 2025-03-04 ENCOUNTER — CLINICAL SUPPORT (OUTPATIENT)
Dept: OBGYN CLINIC | Facility: CLINIC | Age: 50
End: 2025-03-04

## 2025-03-04 VITALS
BODY MASS INDEX: 33.15 KG/M2 | HEIGHT: 65 IN | SYSTOLIC BLOOD PRESSURE: 110 MMHG | DIASTOLIC BLOOD PRESSURE: 70 MMHG | WEIGHT: 199 LBS

## 2025-03-04 DIAGNOSIS — Z30.09 UNWANTED FERTILITY: Primary | ICD-10-CM

## 2025-03-04 PROCEDURE — 96372 THER/PROPH/DIAG INJ SC/IM: CPT

## 2025-03-04 RX ORDER — MEDROXYPROGESTERONE ACETATE 150 MG/ML
150 INJECTION, SUSPENSION INTRAMUSCULAR ONCE
Status: COMPLETED | OUTPATIENT
Start: 2025-03-04 | End: 2025-03-04

## 2025-03-04 RX ADMIN — MEDROXYPROGESTERONE ACETATE 150 MG: 150 INJECTION, SUSPENSION INTRAMUSCULAR at 16:20

## 2025-03-15 ENCOUNTER — TELEPHONE (OUTPATIENT)
Dept: OTHER | Facility: OTHER | Age: 50
End: 2025-03-15

## 2025-03-15 DIAGNOSIS — F41.0 GENERALIZED ANXIETY DISORDER WITH PANIC ATTACKS: ICD-10-CM

## 2025-03-15 DIAGNOSIS — F41.1 GENERALIZED ANXIETY DISORDER WITH PANIC ATTACKS: ICD-10-CM

## 2025-03-15 NOTE — TELEPHONE ENCOUNTER
Medication Refill Request       Medication: ALPRAZolam (XANAX) 1 mg tablet    Dose/Frequency: Take 2 tablets (2 mg total) by mouth daily after lunch AND 0.5 tablets (0.5 mg total) every evening.     Quantity: 75 tablets    Pharmacy: [address] [address]  Phone: [phone]    Office:   [] PCP/Provider -   [x] Specialty/Provider -     Does the patient have enough for 3 days?   [x] Yes   [] No - Send as HP to POD    Is the patient completely out of the medication or does not have enough until the next business day?  [] Yes - send to [facility]  [x] No - Send as HP to POD

## 2025-03-17 RX ORDER — ALPRAZOLAM 1 MG/1
TABLET ORAL
Qty: 75 TABLET | Refills: 0 | Status: SHIPPED | OUTPATIENT
Start: 2025-03-17

## 2025-03-17 NOTE — TELEPHONE ENCOUNTER
03/05/2025 03/04/2025 Zubsolv (Tablet) 56.0 28 8.6 MG-2.1 MG NA [facility] Commercial Insurance 0 / 0 PA   1 [id number] 02/15/2025 11/25/2024 ALPRAZolam (Tablet) 75.0 30 1 MG NA [facility] Medicaid 2 / 2 PA   1 [id number] 02/04/2025 02/04/2025 Zubsolv (Tablet) 56.0 28 8.6 MG-2.1 MG NA [facility] Commercial Insurance 0 / 0 PA   1 [id number] 01/18/2025 11/25/2024 ALPRAZolam (Tablet) 75.0 30 1 MG NA [facility] Medicaid 1 / 2 PA   1 [id number] 01/07/2025 01/07/2025 Zubsolv (Tablet) 56.0 28 8.6 MG-2.1 MG NA [facility] Commercial Insurance 0 / 0 PA   1 [id number] 12/21/2024 11/25/2024 ALPRAZolam (Tablet) 75.0 30 1 MG NA [facility] Medicaid 0 / 2 PA   1 [id number] 12/10/2024 12/10/2024 Zubsolv (Tablet) 56.0 28 8.6 MG-2.1 MG NA [facility] Commercial Insurance 0 / 0 PA   1 [id number] 11/23/2024 10/25/2024 ALPRAZolam (Tablet) 75.0 30 1 MG NA [facility] Private Pay 1 / 1 PA   1 [id number] 11/11/2024 11/11/2024 Zubsolv (Tablet) 58.0 29 8.6 MG

## 2025-05-12 ENCOUNTER — TELEMEDICINE (OUTPATIENT)
Dept: PSYCHIATRY | Facility: CLINIC | Age: 50
End: 2025-05-12
Payer: COMMERCIAL

## 2025-05-12 ENCOUNTER — TELEPHONE (OUTPATIENT)
Dept: PSYCHIATRY | Facility: CLINIC | Age: 50
End: 2025-05-12

## 2025-05-12 ENCOUNTER — TELEPHONE (OUTPATIENT)
Age: 50
End: 2025-05-12

## 2025-05-12 DIAGNOSIS — F41.1 GENERALIZED ANXIETY DISORDER WITH PANIC ATTACKS: ICD-10-CM

## 2025-05-12 DIAGNOSIS — F41.0 GENERALIZED ANXIETY DISORDER WITH PANIC ATTACKS: ICD-10-CM

## 2025-05-12 PROCEDURE — 90833 PSYTX W PT W E/M 30 MIN: CPT | Performed by: STUDENT IN AN ORGANIZED HEALTH CARE EDUCATION/TRAINING PROGRAM

## 2025-05-12 PROCEDURE — 99213 OFFICE O/P EST LOW 20 MIN: CPT | Performed by: STUDENT IN AN ORGANIZED HEALTH CARE EDUCATION/TRAINING PROGRAM

## 2025-05-12 RX ORDER — ALPRAZOLAM 1 MG/1
TABLET ORAL
Qty: 75 TABLET | Refills: 2 | Status: SHIPPED | OUTPATIENT
Start: 2025-05-12 | End: 2025-06-11

## 2025-05-12 NOTE — ASSESSMENT & PLAN NOTE
Orders:    ALPRAZolam (XANAX) 1 mg tablet; Take 2 tablets (2 mg total) by mouth in the morning AND 0.5 tablets (0.5 mg total) every evening.

## 2025-05-12 NOTE — PSYCH
MEDICATION MANAGEMENT NOTE        [facility] MENTAL HEALTH SERVICES      Name and Date of Birth:  Shereen Paris 50 y.o. 1975 MRN: [id number]    Date of Visit: May 12, 2025    Reason for Visit: Follow-up visit for medication management     Administrative Statements   Encounter provider Tu Hardy MD    The Patient is located at Home and in the following state in which I hold an active license [place].    The patient was identified by name and date of birth. Shereen Paris was informed that this is a telemedicine visit and that the visit is being conducted through the [employer] platform. She agrees to proceed..  My office door was closed. No one else was in the room.  She acknowledged consent and understanding of privacy and security of the video platform. The patient has agreed to participate and understands they can discontinue the visit at any time.    I have spent a total time of 26 minutes in caring for this patient on the day of the visit/encounter including Risks and benefits of tx options, Instructions for management, Patient and family education, Importance of tx compliance, Risk factor reductions, Impressions, Counseling / Coordination of care, Documenting in the medical record, Reviewing/placing orders in the medical record (including tests, medications, and/or procedures), and Obtaining or reviewing history  , not including the time spent for establishing the audio/video connection.         SUBJECTIVE:    Shereen Paris is a 50 y.o. female with past psychiatric history significant for generalized anxiety disorder with panic attacks, opiate dependence (Rx Zubsolv via [facility]), insomnia, and extensive history of polysubstance abuse (cannabis, hallucinogens, cocaine) who was personally seen and evaluated today at the [facility] outpatient clinic for follow-up and medication management. Shereen presents as pleasant and cooperative. She does appear to be  "physically ill secondary to \"food poisoning\". Nonetheless, she completes assessment without difficulty.     Shereen endorses compliance with psychotropic medication regimen consisting of Xanax. She denies adverse medication side effects. Shereen reports ongoing frustration with her daughter's behavior and lack of gratitude for Shereen's commitment to her grandson. Supportive therapy and CBT utilized throughout our discussion. She speaks to ways he is now working with OT and speech therapy, to better communicate, given his ADHD and ASD concerns. Joint problem solving utilized. Shereen shares that her daughter voiced interest in having another child and we processed ways this would be overwhelming for Shereen. She was applauded for setting firm boundaries with her daughter regarding her flexibility. Acutely, Shereen's sleep has improved greatly with use of medical grade CBD, as her pain control has improved. Her appetite is stable. No current SI/HI. Shereen reports low energy and motivation currently, but that's secondary to her current state of physical health (food poisoning). She denies SI/HI currently. Shereen is without anheodnia, crying spells, or hopelessness. She continues to find pleasure in spending time with her family. She is eager to attend [employer] this summer where her  will perform (\"[employer]\" is the name of his band). Shereen currently endorses occasional and appropriate anxiety that is not pathologic in nature. Shereen denies extreme or excessive nervousness, irrational worry, or overt anxiousness at the moment. Shereen is not actively restless or tense nor does Shereen feel \"keyed up\" or on-edge. Shereen denies new-onset panic symptomatology or maladaptive behaviors. During today's examination, Shereen does not exhibit objective evidence of j carlos/hypomania or psychosis. Shereen denies recent ETOH or illicit substance abuse. Shereen offers no further concerns.       Current Rating Scores:     None completed " "today.    Review Of Systems:      Constitutional fever, sweating, feeling poorly, feeling tired, low energy, and as noted in HPI   ENT negative   Cardiovascular negative   Respiratory negative   Gastrointestinal abdominal discomfort, nausea, and vomiting   Genitourinary negative   Musculoskeletal back pain, arthralgias, myalgias, and joint pain   Integumentary negative   Neurological negative   Endocrine negative   Other Symptoms none, all other systems are negative       Past Psychiatric History: (unchanged information from previous note copied and italicized) - Information that is bolded has been updated.      Inpatient psychiatric admissions: Numerous past psychiatric admissions - last in 2017 at [facility] for depression, SI in context of drug use  Prior outpatient psychiatric linkage: Numerous psychiatrists in the past - Last linkage with [facility] via [facility]  Past/current psychotherapy: History of psychotherapy linkage, no current therapists  History of suicidal attempts/gestures: Numerous attempts in the past including overdose and \"cutting wrists\"  History of violence/aggressive behaviors: Denies  Psychotropic medication trials: Lexapro, Prozac, Zoloft, Paxil, Remeron, Trazodone, Wellbutrin, BuSpar, VPA, Lithium, Risperdal, Seroquel, Gabapentin (swelling of tongue), Propranolol, Clonidine, Valium, Xanax, Klonopin, Adderall, Ativan, Librium, Doxepin, Ambien, Provigil, Pristiq (too much GI discomfort), Hydroxyzine (now), Nortriptyline (nocturnal delirium)     Substance abuse inpatient/outpatient rehabilitation: 10+ rehab/detox admissions - first at the age of 18 in [place] - last in 2017 at [facility]     Substance Abuse History: (unchanged information from previous note copied and italicized) - Information that is bolded has been updated.     Extensive history of illict substance (cocaine, methamphetamine, cannabis, opiates, hallucinogen), and tobacco abuse. She denies a history of ETOH abuse. No " past legal actions yet 1 prior arrest secondary to substance intoxication. The patient denies prior DWIs/DUIs. Extensive history of outpatient/inpatient rehabilitation programs. Shereen does not exhibit objective evidence of substance withdrawal during today's examination nor does Shereen appear under the influence of any psychoactive substance.          Social History: (unchanged information from previous note copied and italicized) - Information that is bolded has been updated.      Developmental: Denies a history of milestone/developmental delay. Denies a history of in-utero exposure to toxins/illicit substances. There is no documented history of IEP or need for special education.  Education: college graduate - [email or web address]  Marital history:   Living arrangement, social support: , has 2 children (daughter and son), daughter has child (so Shereen is grandmother now)  Occupational History: on permanent disability  Access to firearms: Denies direct access to weapons/firearms. Shereen Paris has no history of arrests or violence with a deadly weapon.      Traumatic History: (unchanged information from previous note copied and italicized) - Information that is bolded has been updated.      Abuse:none is reported  Other Traumatic Events: Father dying in 2017 was problematic     Past Medical History:    Past Medical History:   Diagnosis Date    Anxiety     Arthritis     Chlamydia 1991    Depression     Hepatitis C     Migraine         Past Surgical History:   Procedure Laterality Date    ALVEOPLASTY N/A 10/09/2023    Procedure: BILATERAL LOWER LINGUAL SACHIN REDUCTION, ALVEOPLASTY ALL FOUR QUADRANTS, UPPER LEFT/ UPPER RIGHT, LOWER LEFT/ LOWER RIGHT;  Surgeon: Michael Awadallah;  Location: [facility];  Service: Maxillofacial    APPENDECTOMY  2000    CHOLECYSTECTOMY  1998    HEMORRHOID SURGERY  1998     Allergies   Allergen Reactions    Ciprofloxacin Anaphylaxis     Interacts with Zubsolv    Codeine  Lightheadedness     Reaction Date: 07Jun2011;     Morphine Lightheadedness     Reaction Date: 07Jun2011;     Sulfa Antibiotics Anaphylaxis and Rash    Tramadol Lightheadedness    Bactrim [Sulfamethoxazole-Trimethoprim] Hives       Substance Abuse History:    Social History     Substance and Sexual Activity   Alcohol Use Not Currently    Comment: last drink 8/2022     Social History     Substance and Sexual Activity   Drug Use Not Currently    Types: Heroin, Cocaine, Methamphetamines    Comment: last used 9/2017 - manages w/Suboxone therapy       Social History:    Social History     Socioeconomic History    Marital status: /Civil Union     Spouse name: Lucius    Number of children: 2    Years of education: Not on file    Highest education level: Not on file   Occupational History    Occupation: homemaker   Tobacco Use    Smoking status: Former     Types: Cigarettes    Smokeless tobacco: Never    Tobacco comments:     Quit 2019   Vaping Use    Vaping status: Every Day    Substances: THC, CBD   Substance and Sexual Activity    Alcohol use: Not Currently     Comment: last drink 8/2022    Drug use: Not Currently     Types: Heroin, Cocaine, Methamphetamines     Comment: last used 9/2017 - manages w/Suboxone therapy    Sexual activity: Yes     Partners: Male     Birth control/protection: Injection   Other Topics Concern    Not on file   Social History Narrative    Not on file     Social Drivers of Health     Financial Resource Strain: Low Risk  (3/4/2025)    Overall Financial Resource Strain (CARDIA)     Difficulty of Paying Living Expenses: Not hard at all   Food Insecurity: No Food Insecurity (3/4/2025)    Hunger Vital Sign     Worried About Running Out of Food in the Last Year: Never true     Ran Out of Food in the Last Year: Never true   Transportation Needs: No Transportation Needs (3/4/2025)    PRAPARE - Transportation     Lack of Transportation (Medical): No     Lack of Transportation (Non-Medical): No    Physical Activity: Insufficiently Active (9/19/2019)    Exercise Vital Sign     Days of Exercise per Week: 3 days     Minutes of Exercise per Session: 30 min   Stress: No Stress Concern Present (3/22/2022)    [employer] - Occupational Stress Questionnaire     Feeling of Stress : Only a little   Social Connections: Unknown (9/19/2019)    Social Connection and Isolation Panel [NHANES]     Frequency of Communication with Friends and Family: Patient declined     Frequency of Social Gatherings with Friends and Family: Patient declined     Attends Shinto Services: Patient declined     Active Member of Clubs or Organizations: Patient declined     Attends Club or Organization Meetings: Patient declined     Marital Status: Patient declined   Intimate Partner Violence: Not At Risk (9/19/2019)    Humiliation, Afraid, Rape, and Kick questionnaire     Fear of Current or Ex-Partner: No     Emotionally Abused: No     Physically Abused: No     Sexually Abused: No   Housing Stability: Low Risk  (3/4/2025)    Housing Stability Vital Sign     Unable to Pay for Housing in the Last Year: No     Number of Times Moved in the Last Year: 1     Homeless in the Last Year: No       Family Psychiatric History:     Family History   Problem Relation Age of Onset    Drug abuse Mother     Cancer Father         lung    Drug abuse Father     No Known Problems Sister     No Known Problems Daughter     No Known Problems Daughter     No Known Problems Maternal Grandmother     Cancer Maternal Grandfather         melanoma, prostate, lymphoma    Breast cancer Maternal Grandfather 80    Prostate cancer Maternal Grandfather     Melanoma Maternal Grandfather     Colon cancer Maternal Grandfather 70    No Known Problems Paternal Grandmother     No Known Problems Paternal Grandfather     No Known Problems Maternal Aunt        History Review: The following portions of the patient's history were reviewed and updated as  appropriate: allergies, current medications, past family history, past medical history, past social history, past surgical history, and problem list.         OBJECTIVE:     Vital signs in last 24 hours:    There were no vitals filed for this visit.    Mental Status Evaluation:    Appearance age appropriate, casually dressed, dressed appropriately, looks stated age   Behavior pleasant, cooperative, calm, good eye contact   Speech normal rate, normal volume, normal pitch   Mood euthymic   Affect normal range and intensity, appropriate   Thought Processes organized, coherent, goal directed   Associations intact associations   Thought Content no overt delusions   Perceptual Disturbances: no auditory hallucinations, no visual hallucinations   Abnormal Thoughts  Risk Potential Suicidal ideation - None at present  Homicidal ideation - None at present  Potential for aggression - No   Orientation oriented to person, place, time/date, and situation   Memory recent and remote memory grossly intact   Consciousness alert and awake   Attention Span Concentration Span attention span and concentration are age appropriate   Intellect appears to be of average intelligence   Insight intact   Judgement intact and good   Muscle Strength and  Gait unable to assess today due to virtual visit   Motor activity no abnormal movements   Language no difficulty naming common objects   Fund of Knowledge adequate knowledge of current events   Pain none   Pain Scale Did not ask patient to formally rate       Laboratory Results: I have personally reviewed all pertinent laboratory/tests results    Recent Labs:   No visits with results within 1 Month(s) from this visit.   Latest known visit with results is:   Annual Exam on 02/03/2025   Component Date Value    Case Report 02/03/2025                      Value:Gynecologic Cytology Report                       Case: [id number]                                  Authorizing Provider:  Rebecca Perdomo  HILARIO   Collected:           02/03/2025 1341              Ordering Location:     [facility]      Received:            02/03/2025 1341                                     [facility]                                                          First Screen:          Brook Arreola                                                            Specimen:    LIQUID-BASED PAP, SCREENING, Cervix, Endocervical                                          Primary Interpretation 02/03/2025 Negative for intraepithelial lesion or malignancy     Specimen Adequacy 02/03/2025 Satisfactory for evaluation. Endocervical/transformation zone component present.     Additional Information 02/03/2025                      Value:[facility]'s FDA approved ,  and ThinPrep Imaging Duo System are utilized with strict adherence to the 's instruction manual to prepare gynecologic and non-gynecologic cytology specimens for the production of ThinPrep slides as well as for gynecologic ThinPrep imaging. These processes have been validated by our laboratory and/or by the .  The Pap test is not a diagnostic procedure and should not be used as the sole means to detect cervical cancer. It is only a screening procedure to aid in the detection of cervical cancer and its precursors. Both false-negative and false-positive results have been experienced. Your patient's test result should be interpreted in this context together with the history and clinical findings.      Gross Description 02/03/2025                      Value:20 ml , colorless, cloudy received in a ThinPrep vial.      HPV Other HR 02/03/2025 Negative     HPV16 02/03/2025 Negative     HPV18 02/03/2025 Negative        Suicide/Homicide Risk Assessment:    The following interventions are recommended: continue medication management, no intervention changes needed      Lethality Statement:    Based on today's assessment and clinical criteria, Shereen Paris  contracts for safety and is not an imminent risk of harm to self or others. Outpatient level of care is deemed appropriate at this current time. Shereen understands that if they can no longer contract for safety, they need to call the office or report to their nearest Emergency Room for immediate evaluation.      Assessment/Plan:     Shereen Paris is a 50 y.o. female with past psychiatric history significant for generalized anxiety disorder with panic attacks, opiate dependence (Rx Zubsolv via [facility]), insomnia, and extensive history of polysubstance abuse (cannabis, hallucinogens, cocaine) who was personally seen and evaluated today at the [facility] outpatient clinic for follow-up and medication management. Shereen endorses a longstanding history of anxiety and episodic depression that served as the catalyst for her substance abuse. She states that her biological parents abused illicit substances and thus, they were emotionally neglectful and inconsistent. At the age of 14, Shereen began using cannabis which ultimately lead to use of numerous illicit substances. Shereen reports placement in 10+ detox/rehabilitation programs throughout the course of her life. Her first rehabilitation program was at age 18 in [place]. She last attended rehab via [facility] in 2017, following the death of her father and and subsequent inpatient admission at [facility]. She endorses sobriety from opiates and other illicit substances for the last 3+ years. She was recently psychiatrically managed via [facility]IL[facility]IO Cox through [facility]. However, given their change in policy regarding concurrent use of opiates and benzodiazepines, Shereen was transferred to [facility] for safer, more appropriate tapering process. Shereen has been treated with benzodiazepines since her early 20's. She was previously on Xanax 6mg Daily (2mg TID) but was recently tapered to 4mg Daily (2mg AM, 1mg afternoon, 1mg QHS) over the last  "8 months. PDMP nena reviewed, no concerns for abuse or misuse. I spoke at length with Shereen about 's policy regarding concurrent use of opiates and benzodiazepines and risks/alternatives to treatment. She voiced understanding regarding the need to taper off Xanax and was agreeable to do so. I also discussed possibility of faster tapering process via detox program at [place], to which she was resistant given responsibility of caring for her family. Shereen denies most neurovegetative symptomatology suggestive of major depressive disorder. Shereen adamantly denies acute thoughts of suicide or self-harm. Shereen has no plans to harm others. There is a documented history of prior suicidal gestures/suicidal attempts. Shereen admits to overdosing and \"cutting her wrists\" in the past. She has not harmed herself or attempted to end her life in 4+ years. Shereen endorses a historical struggle with anxiety and symptomatology consistent with generalized anxiety disorder. Shereen reports anxiety that is pathologic in nature. Shereen endorses excessive nervousness, irrational worry, and overt anxiousness. Shereen is not pervasively restless or tense but does report feeling episodically keyed-up and on-edge. Shereen experiences disruption in energy and concentration secondary to anxiety. There is evidence to suggest that Shereen experiences irritability, inability to relax, and disruption in sleep in the past, secondary to pathologic anxiety. Shereen denies new-onset panic symptomatology but does report a history of panic attacks characterized by tremor, SOB, fear of impending doom, and overt anxiousness. She denies maladaptive behaviors. Shereen vehemently denies any acute or chronic history suggestive of an underlying affective (bipolar) organization. Shereen denies historical symptomatology suggestive of an underlying psychotic process. Shereen denies historical symptomatology suggestive of PTSD, OCD, or disordered eating.         Today's " Plan:     Psychopharmacologically, Shereen reports benefit and tolerability with current regimen. She denies need for medication change or intervention.      DSM-V Diagnoses:      1.) Generalized Anxiety Disorder with Panic Attacks   2.) Opiate dependence, continuous  3.) Polysubstance abuse by history   4.) Insomnia        Treatment Recommendations/Precautions:     1.) Generalized Anxiety Disorder with Panic Attacks   - Continue Xanax 2mg afternoon, 0.5mg QHS (total of 0.5mg reduction) - tapering process in effect (was previously on high doses)              - I inherited Shereen on high-dose Xanax - plan has been to ultimately taper off Xanax given concurrent use of Suboxone, however, Shereen has been on BZ for 20+ years so tapering process will be arduous      2.) Opiate dependence, continuous  - Rx Z[facility]solv 8.[facility]mg [facility]       3.) Polysubstance abuse by history   - Currently sober - 7 years (anniversary is September)  - Counseled to avoid ETOH, illict substances, and nicotine secondary to the detrimental effects of these substances on mental and physical health     4.) Insomnia  - Now using medical-grade CBD  Assessment & Plan  Generalized anxiety disorder with panic attacks    Orders:    ALPRAZolam (XANAX) 1 mg tablet; Take 2 tablets (2 mg total) by mouth in the morning AND 0.5 tablets (0.5 mg total) every evening.        Does not want any medication changes  Aware of need to follow up with family physician for medical issues  Aware of 24 hour and weekend coverage for urgent situations accessed by calling [facility] main practice number    Medications Risks/Benefits      Risks, Benefits And Possible Side Effects Of Medications:    Risks, benefits, and possible side effects of medications explained to Shereen including risks of misuse, abuse or dependence, sedation and respiratory depression related to treatment with benzodiazepine medications. She verbalizes understanding and agreement for  treatment.    Controlled Medication Discussion:     Shereen has been filling controlled prescriptions on time as prescribed according to [place] Prescription Drug Monitoring Program  Discussed with Shereen the risks of sedation, respiratory depression, impairment of ability to drive and potential for abuse and addiction related to treatment with benzodiazepine medications. She understands risk of treatment with benzodiazepine medications, agrees to not drive if feels impaired and agrees to take medications as prescribed  Discussed with Shereen Black Box warning on concurrent use of benzodiazepines and opioid medications including sedation, respiratory depression, coma and death. She understands the risk of treatment with benzodiazepines in addition to opioids and wants to continue taking those medications    Psychotherapy Provided:     Individual psychotherapy provided: Yes  Counseling was provided during the session today for 20 minutes.  Medications, treatment progress and treatment plan reviewed with Shereen.  Medication education provided to Shereen.  Recent stressors discussed with Shereen including family problems, family conflict, family issues, marital problems, health issues, medical problems, chronic pain, limited support, social difficulties, everyday stressors, and occasional anxiety.  Coping strategies reviewed with Shereen.   Educated on importance of medication and treatment compliance.  Supportive therapy provided.   Cognitive therapy was utilized during the session.     Treatment Plan:    Completed and signed during the session: Yes - Treatment Plan done but not signed at time of office visit due to:  Plan reviewed by video and verbal consent given due to virtual visit.      Visit Time    Visit Start Time: 2:10 PM  Visit Stop Time: 2:36 PM  Total Visit Duration:  26 minutes     The total visit duration detailed above includes: patient engagement, medication management, psychotherapy/counseling, discussion  regarding treatment goals, documentation, review of past medical records, and coordination of care.      Note Share Disclaimer:     This note was not shared with the patient due to reasonable likelihood of causing patient harm      Tu Hardy MD  Board Certified Diplomate of the American Board of Psychiatry and Neurology  05/12/25

## 2025-05-12 NOTE — TELEPHONE ENCOUNTER
Called and left message for patient to return a call to [phone] and schedule 4 month follow up with provider (Dr Hardy ). Please schedule upon return call. Thank you.

## 2025-05-12 NOTE — TELEPHONE ENCOUNTER
Pt called to get her appt for 5/12 at 2pm switched to a virtual visit due to being sick.  Pt has [facility] and will connect with provider through [employer]. Writer switched appt and checked it in.

## 2025-05-12 NOTE — BH TREATMENT PLAN
TREATMENT PLAN (Medication Management Only)        [facility]      Name and Date of Birth:  Shereen Paris 50 y.o. 1975 MRN: [id number]    Date of Visit: May 12, 2025    Diagnosis/Diagnoses:    1. Generalized anxiety disorder with panic attacks  ALPRAZolam (XANAX) 1 mg tablet          Strengths/Personal Resources for Self-Care: supportive family, taking medications as prescribed, ability to adapt to life changes, ability to communicate needs, ability to communicate well, ability to listen, ability to reason, ability to understand psychiatric illness, average or above intelligence, good understanding of illness, independence, motivation for treatment, ability to negotiate basic needs, being resoureceful, self-reliance.    Area/Areas of need (in own words): anxiety symptoms    1. Long Term Goal: maintain control of anxiety.  Target Date:6 months - 11/12/2025  Person/Persons responsible for completion of goal: Shereen    2.  Short Term Objective (s) - How will we reach this goal?:   A. Provider new recommended medication/dosage changes and/or continue medication(s): continue current medications as prescribed.  B. Keep regularly scheduled psychiatric appointments  C. Maintain adherence to psychotropic medication regimen   D. Continue to set boundaries with daughter  E. Maintain appropriate dietary intake  F. Practice adequate sleep hygiene      Target Date:6 months - 11/12/2025  Person/Persons Responsible for Completion of Goal: Shereen    Progress Towards Goals: continuing treatment    Treatment Modality: medication management every 3 months    Review due 180 days from date of this plan: 6 months - 11/12/2025  Expected length of service: ongoing treatment    My Physician/PA/NP and I have developed this plan together and I agree to work on the goals and objectives. I understand the treatment goals that were developed for my treatment. Treatment Plan was completed with  Shereen's assistance and input throughout today's session. Shereen consented to the information provided and documented above. Unfortunately, treatment plan was not physically signed at the time of this office visit secondary to time constraints and issues related to signature keypad. Again, Shereen did verbally consent.

## 2025-05-15 ENCOUNTER — TELEPHONE (OUTPATIENT)
Age: 50
End: 2025-05-15

## 2025-05-15 NOTE — TELEPHONE ENCOUNTER
Patient contacted the office to schedule a follow up visit with provider. Patient is now scheduled for 9/16/25  at 2:30p in office.   
[phone]

## 2025-06-02 ENCOUNTER — CLINICAL SUPPORT (OUTPATIENT)
Dept: OBGYN CLINIC | Facility: CLINIC | Age: 50
End: 2025-06-02

## 2025-06-02 VITALS — BODY MASS INDEX: 32.32 KG/M2 | DIASTOLIC BLOOD PRESSURE: 90 MMHG | SYSTOLIC BLOOD PRESSURE: 160 MMHG | WEIGHT: 194.2 LBS

## 2025-06-02 DIAGNOSIS — Z30.42 ENCOUNTER FOR MANAGEMENT AND INJECTION OF DEPO-PROVERA: Primary | ICD-10-CM

## 2025-06-02 PROCEDURE — 96372 THER/PROPH/DIAG INJ SC/IM: CPT

## 2025-06-02 RX ORDER — MEDROXYPROGESTERONE ACETATE 150 MG/ML
150 INJECTION, SUSPENSION INTRAMUSCULAR ONCE
Status: COMPLETED | OUTPATIENT
Start: 2025-06-02 | End: 2025-06-02

## 2025-06-02 RX ADMIN — MEDROXYPROGESTERONE ACETATE 150 MG: 150 INJECTION, SUSPENSION INTRAMUSCULAR at 10:57

## 2025-06-02 NOTE — PROGRESS NOTES
Depo given in the left buttocks  Pt was advised to call PCP about BP states is having a lot of pain today.         NDC#[id number]  LOT# [id number]  Exp#12/31/2026

## 2025-06-09 DIAGNOSIS — F51.04 PSYCHOPHYSIOLOGICAL INSOMNIA: ICD-10-CM

## 2025-06-09 RX ORDER — HYDROXYZINE PAMOATE 50 MG/1
CAPSULE ORAL
Qty: 30 CAPSULE | Refills: 0 | Status: SHIPPED | OUTPATIENT
Start: 2025-06-09

## 2025-06-09 NOTE — TELEPHONE ENCOUNTER
Patient called to request a refill for their hydrOXYzine pamoate (VISTARIL) 50 mg capsule advised a refill was requested on 6/9/2025 and is pending approval. Patient verbalized understanding and is in agreement.     Does the patient have enough for 3 days?   [x] Yes   [] No - Send as HP to POD

## 2025-07-29 ENCOUNTER — OFFICE VISIT (OUTPATIENT)
Dept: URGENT CARE | Age: 50
End: 2025-07-29
Payer: MEDICARE

## 2025-07-29 VITALS
TEMPERATURE: 98.4 F | HEART RATE: 117 BPM | SYSTOLIC BLOOD PRESSURE: 139 MMHG | RESPIRATION RATE: 18 BRPM | DIASTOLIC BLOOD PRESSURE: 81 MMHG | OXYGEN SATURATION: 100 %
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
## 2025-07-29 DIAGNOSIS — S41.152A CAT BITE OF LEFT UPPER ARM, INITIAL ENCOUNTER: Primary | ICD-10-CM

## 2025-07-29 PROCEDURE — 99213 OFFICE O/P EST LOW 20 MIN: CPT

## 2025-07-29 RX ORDER — TRIAMCINOLONE ACETONIDE 1 MG/G
CREAM TOPICAL 2 TIMES DAILY
Qty: 45 G | Refills: 0 | Status: SHIPPED | OUTPATIENT
Start: 2025-07-29 | End: 2025-08-05

## 2025-08-04 DIAGNOSIS — F41.1 GENERALIZED ANXIETY DISORDER WITH PANIC ATTACKS: ICD-10-CM

## 2025-08-04 DIAGNOSIS — F41.0 GENERALIZED ANXIETY DISORDER WITH PANIC ATTACKS: ICD-10-CM

## 2025-08-04 RX ORDER — ALPRAZOLAM 1 MG/1
TABLET ORAL
Qty: 75 TABLET | Refills: 2 | Status: SHIPPED | OUTPATIENT
Start: 2025-08-04 | End: 2025-09-03

## 2025-08-19 ENCOUNTER — CLINICAL SUPPORT (OUTPATIENT)
Dept: OBGYN CLINIC | Facility: CLINIC | Age: 50
End: 2025-08-19

## 2025-08-19 VITALS
WEIGHT: 183.4 LBS | SYSTOLIC BLOOD PRESSURE: 138 MMHG | HEIGHT: 65 IN | DIASTOLIC BLOOD PRESSURE: 80 MMHG | BODY MASS INDEX: 30.56 KG/M2

## 2025-08-19 DIAGNOSIS — Z30.09 UNWANTED FERTILITY: Primary | ICD-10-CM

## 2025-08-19 PROCEDURE — 96372 THER/PROPH/DIAG INJ SC/IM: CPT

## 2025-08-19 RX ORDER — MEDROXYPROGESTERONE ACETATE 150 MG/ML
150 INJECTION, SUSPENSION INTRAMUSCULAR ONCE
Status: COMPLETED | OUTPATIENT
Start: 2025-08-19 | End: 2025-08-19

## 2025-08-19 RX ORDER — POLYETHYLENE GLYCOL 3350 17 G/17G
POWDER, FOR SOLUTION ORAL
COMMUNITY
Start: 2025-07-07

## 2025-08-19 RX ADMIN — MEDROXYPROGESTERONE ACETATE 150 MG: 150 INJECTION, SUSPENSION INTRAMUSCULAR at 10:38

## (undated) DEVICE — 2000CC GUARDIAN II: Brand: GUARDIAN

## (undated) DEVICE — ELECTRODE NEEDLE E-Z CLEAN 2.75IN 7CM -0013

## (undated) DEVICE — DENTAL BURR ROUND WHITE

## (undated) DEVICE — STERILE MANDIBLE PACK: Brand: CARDINAL HEALTH

## (undated) DEVICE — GLOVE SRG BIOGEL 7

## (undated) DEVICE — DENTAL BURR SIDE WHITE

## (undated) DEVICE — NEEDLE 25G X 1 1/2

## (undated) DEVICE — PENCIL ELECTROSURG E-Z CLEAN -0035H